# Patient Record
Sex: MALE | Race: WHITE | NOT HISPANIC OR LATINO | Employment: OTHER | URBAN - METROPOLITAN AREA
[De-identification: names, ages, dates, MRNs, and addresses within clinical notes are randomized per-mention and may not be internally consistent; named-entity substitution may affect disease eponyms.]

---

## 2022-11-24 ENCOUNTER — HOSPITAL ENCOUNTER (INPATIENT)
Facility: HOSPITAL | Age: 72
LOS: 4 days | Discharge: HOME HEALTH | DRG: 522 | End: 2022-11-28
Attending: EMERGENCY MEDICINE | Admitting: FAMILY MEDICINE
Payer: MEDICARE

## 2022-11-24 DIAGNOSIS — R07.9 CHEST PAIN: ICD-10-CM
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
## 2022-11-24 DIAGNOSIS — Z96.642 S/P TOTAL LEFT HIP ARTHROPLASTY: ICD-10-CM

## 2022-11-24 DIAGNOSIS — S72.002A CLOSED DISPLACED FRACTURE OF LEFT FEMORAL NECK: Primary | ICD-10-CM

## 2022-11-24 DIAGNOSIS — Z01.810 PREOP CARDIOVASCULAR EXAM: ICD-10-CM

## 2022-11-24 DIAGNOSIS — M25.552 LEFT HIP PAIN: ICD-10-CM

## 2022-11-24 LAB
ALBUMIN SERPL BCP-MCNC: 3.4 G/DL (ref 3.5–5.2)
ALP SERPL-CCNC: 57 U/L (ref 55–135)
ALT SERPL W/O P-5'-P-CCNC: 16 U/L (ref 10–44)
ANION GAP SERPL CALC-SCNC: 13 MMOL/L (ref 8–16)
APTT BLDCRRT: 24.2 SEC (ref 21–32)
AST SERPL-CCNC: 18 U/L (ref 10–40)
BACTERIA #/AREA URNS HPF: NORMAL /HPF
BASOPHILS # BLD AUTO: 0.07 K/UL (ref 0–0.2)
BASOPHILS NFR BLD: 0.9 % (ref 0–1.9)
BILIRUB SERPL-MCNC: 0.6 MG/DL (ref 0.1–1)
BILIRUB UR QL STRIP: NEGATIVE
BUN SERPL-MCNC: 24 MG/DL (ref 8–23)
CALCIUM SERPL-MCNC: 8.8 MG/DL (ref 8.7–10.5)
CHLORIDE SERPL-SCNC: 108 MMOL/L (ref 95–110)
CLARITY UR: CLEAR
CO2 SERPL-SCNC: 18 MMOL/L (ref 23–29)
COLOR UR: YELLOW
CREAT SERPL-MCNC: 1.4 MG/DL (ref 0.5–1.4)
DIFFERENTIAL METHOD: ABNORMAL
EOSINOPHIL # BLD AUTO: 0.1 K/UL (ref 0–0.5)
EOSINOPHIL NFR BLD: 0.6 % (ref 0–8)
ERYTHROCYTE [DISTWIDTH] IN BLOOD BY AUTOMATED COUNT: 13.5 % (ref 11.5–14.5)
EST. GFR  (NO RACE VARIABLE): 53 ML/MIN/1.73 M^2
GLUCOSE SERPL-MCNC: 78 MG/DL (ref 70–110)
GLUCOSE UR QL STRIP: NEGATIVE
HCT VFR BLD AUTO: 40 % (ref 40–54)
HGB BLD-MCNC: 13.5 G/DL (ref 14–18)
HGB UR QL STRIP: ABNORMAL
HYALINE CASTS #/AREA URNS LPF: 0 /LPF
IMM GRANULOCYTES # BLD AUTO: 0.15 K/UL (ref 0–0.04)
IMM GRANULOCYTES NFR BLD AUTO: 1.9 % (ref 0–0.5)
INR PPP: 1.1 (ref 0.8–1.2)
KETONES UR QL STRIP: ABNORMAL
LEUKOCYTE ESTERASE UR QL STRIP: NEGATIVE
LYMPHOCYTES # BLD AUTO: 0.9 K/UL (ref 1–4.8)
LYMPHOCYTES NFR BLD: 11.4 % (ref 18–48)
MCH RBC QN AUTO: 30.3 PG (ref 27–31)
MCHC RBC AUTO-ENTMCNC: 33.8 G/DL (ref 32–36)
MCV RBC AUTO: 90 FL (ref 82–98)
MICROSCOPIC COMMENT: NORMAL
MONOCYTES # BLD AUTO: 1.2 K/UL (ref 0.3–1)
MONOCYTES NFR BLD: 14.9 % (ref 4–15)
NEUTROPHILS # BLD AUTO: 5.6 K/UL (ref 1.8–7.7)
NEUTROPHILS NFR BLD: 70.3 % (ref 38–73)
NITRITE UR QL STRIP: NEGATIVE
NRBC BLD-RTO: 0 /100 WBC
PH UR STRIP: 6 [PH] (ref 5–8)
PLATELET # BLD AUTO: 183 K/UL (ref 150–450)
PMV BLD AUTO: 9.8 FL (ref 9.2–12.9)
POCT GLUCOSE: 107 MG/DL (ref 70–110)
POTASSIUM SERPL-SCNC: 3.5 MMOL/L (ref 3.5–5.1)
PROT SERPL-MCNC: 7.1 G/DL (ref 6–8.4)
PROT UR QL STRIP: ABNORMAL
PROTHROMBIN TIME: 11.4 SEC (ref 9–12.5)
RBC # BLD AUTO: 4.46 M/UL (ref 4.6–6.2)
RBC #/AREA URNS HPF: 2 /HPF (ref 0–4)
SODIUM SERPL-SCNC: 139 MMOL/L (ref 136–145)
SP GR UR STRIP: 1.01 (ref 1–1.03)
URN SPEC COLLECT METH UR: ABNORMAL
UROBILINOGEN UR STRIP-ACNC: NEGATIVE EU/DL
WBC # BLD AUTO: 7.96 K/UL (ref 3.9–12.7)
WBC #/AREA URNS HPF: 0 /HPF (ref 0–5)
WBC CLUMPS URNS QL MICRO: NORMAL

## 2022-11-24 PROCEDURE — 27000221 HC OXYGEN, UP TO 24 HOURS

## 2022-11-24 PROCEDURE — 93005 ELECTROCARDIOGRAM TRACING: CPT

## 2022-11-24 PROCEDURE — 63600175 PHARM REV CODE 636 W HCPCS: Performed by: NURSE PRACTITIONER

## 2022-11-24 PROCEDURE — 85025 COMPLETE CBC W/AUTO DIFF WBC: CPT | Performed by: EMERGENCY MEDICINE

## 2022-11-24 PROCEDURE — 25000003 PHARM REV CODE 250: Performed by: NURSE PRACTITIONER

## 2022-11-24 PROCEDURE — 85730 THROMBOPLASTIN TIME PARTIAL: CPT | Performed by: EMERGENCY MEDICINE

## 2022-11-24 PROCEDURE — 93010 ELECTROCARDIOGRAM REPORT: CPT | Mod: ,,, | Performed by: INTERNAL MEDICINE

## 2022-11-24 PROCEDURE — 63600175 PHARM REV CODE 636 W HCPCS: Performed by: EMERGENCY MEDICINE

## 2022-11-24 PROCEDURE — 80053 COMPREHEN METABOLIC PANEL: CPT | Performed by: EMERGENCY MEDICINE

## 2022-11-24 PROCEDURE — 99285 EMERGENCY DEPT VISIT HI MDM: CPT | Mod: 25

## 2022-11-24 PROCEDURE — 85610 PROTHROMBIN TIME: CPT | Performed by: EMERGENCY MEDICINE

## 2022-11-24 PROCEDURE — 93010 EKG 12-LEAD: ICD-10-PCS | Mod: ,,, | Performed by: INTERNAL MEDICINE

## 2022-11-24 PROCEDURE — 11000001 HC ACUTE MED/SURG PRIVATE ROOM

## 2022-11-24 PROCEDURE — 81000 URINALYSIS NONAUTO W/SCOPE: CPT | Performed by: EMERGENCY MEDICINE

## 2022-11-24 RX ORDER — NAPROXEN SODIUM 220 MG/1
81 TABLET, FILM COATED ORAL DAILY
Status: ON HOLD | COMMUNITY
End: 2022-11-28 | Stop reason: SDUPTHER

## 2022-11-24 RX ORDER — OLMESARTAN MEDOXOMIL 40 MG/1
TABLET ORAL
COMMUNITY
End: 2022-11-24 | Stop reason: DRUGHIGH

## 2022-11-24 RX ORDER — MORPHINE SULFATE 4 MG/ML
4 INJECTION, SOLUTION INTRAMUSCULAR; INTRAVENOUS
Status: COMPLETED | OUTPATIENT
Start: 2022-11-24 | End: 2022-11-24

## 2022-11-24 RX ORDER — LOSARTAN POTASSIUM 50 MG/1
100 TABLET ORAL DAILY
Status: DISCONTINUED | OUTPATIENT
Start: 2022-11-24 | End: 2022-11-25

## 2022-11-24 RX ORDER — MAG HYDROX/ALUMINUM HYD/SIMETH 200-200-20
30 SUSPENSION, ORAL (FINAL DOSE FORM) ORAL 4 TIMES DAILY PRN
Status: DISCONTINUED | OUTPATIENT
Start: 2022-11-24 | End: 2022-11-26

## 2022-11-24 RX ORDER — AMLODIPINE BESYLATE 10 MG/1
1 TABLET ORAL DAILY
COMMUNITY
Start: 2022-06-28

## 2022-11-24 RX ORDER — AMOXICILLIN 250 MG
1 CAPSULE ORAL 2 TIMES DAILY PRN
Status: DISCONTINUED | OUTPATIENT
Start: 2022-11-24 | End: 2022-11-27

## 2022-11-24 RX ORDER — NALOXONE HCL 0.4 MG/ML
0.02 VIAL (ML) INJECTION
Status: DISCONTINUED | OUTPATIENT
Start: 2022-11-24 | End: 2022-11-28 | Stop reason: HOSPADM

## 2022-11-24 RX ORDER — ONDANSETRON 2 MG/ML
4 INJECTION INTRAMUSCULAR; INTRAVENOUS
Status: COMPLETED | OUTPATIENT
Start: 2022-11-24 | End: 2022-11-24

## 2022-11-24 RX ORDER — OLMESARTAN MEDOXOMIL AND HYDROCHLOROTHIAZIDE 40/12.5 40; 12.5 MG/1; MG/1
1 TABLET ORAL DAILY
COMMUNITY

## 2022-11-24 RX ORDER — GLIMEPIRIDE 1 MG/1
1 TABLET ORAL 2 TIMES DAILY
COMMUNITY
Start: 2022-10-04

## 2022-11-24 RX ORDER — LEVOTHYROXINE SODIUM 50 UG/1
50 TABLET ORAL
COMMUNITY

## 2022-11-24 RX ORDER — INSULIN GLARGINE 300 U/ML
300 INJECTION, SOLUTION SUBCUTANEOUS DAILY
COMMUNITY
Start: 2022-05-06

## 2022-11-24 RX ORDER — LEVOTHYROXINE SODIUM 50 UG/1
50 TABLET ORAL
Status: DISCONTINUED | OUTPATIENT
Start: 2022-11-25 | End: 2022-11-28 | Stop reason: HOSPADM

## 2022-11-24 RX ORDER — GLUCAGON 1 MG
1 KIT INJECTION
Status: DISCONTINUED | OUTPATIENT
Start: 2022-11-24 | End: 2022-11-28 | Stop reason: HOSPADM

## 2022-11-24 RX ORDER — IBUPROFEN 200 MG
16 TABLET ORAL
Status: DISCONTINUED | OUTPATIENT
Start: 2022-11-24 | End: 2022-11-28 | Stop reason: HOSPADM

## 2022-11-24 RX ORDER — IBUPROFEN 200 MG
24 TABLET ORAL
Status: DISCONTINUED | OUTPATIENT
Start: 2022-11-24 | End: 2022-11-28 | Stop reason: HOSPADM

## 2022-11-24 RX ORDER — MORPHINE SULFATE 4 MG/ML
4 INJECTION, SOLUTION INTRAMUSCULAR; INTRAVENOUS EVERY 4 HOURS PRN
Status: DISCONTINUED | OUTPATIENT
Start: 2022-11-24 | End: 2022-11-26

## 2022-11-24 RX ORDER — ONDANSETRON 2 MG/ML
4 INJECTION INTRAMUSCULAR; INTRAVENOUS EVERY 8 HOURS PRN
Status: DISCONTINUED | OUTPATIENT
Start: 2022-11-24 | End: 2022-11-28 | Stop reason: HOSPADM

## 2022-11-24 RX ORDER — TAMSULOSIN HYDROCHLORIDE 0.4 MG/1
0.4 CAPSULE ORAL DAILY
COMMUNITY

## 2022-11-24 RX ORDER — METOPROLOL SUCCINATE 50 MG/1
50 TABLET, EXTENDED RELEASE ORAL DAILY
COMMUNITY
Start: 2022-02-03

## 2022-11-24 RX ORDER — NAPROXEN SODIUM 220 MG/1
81 TABLET, FILM COATED ORAL DAILY
Status: DISCONTINUED | OUTPATIENT
Start: 2022-11-26 | End: 2022-11-24

## 2022-11-24 RX ORDER — CHOLECALCIFEROL (VITAMIN D3) 50 MCG
1 TABLET ORAL DAILY
COMMUNITY

## 2022-11-24 RX ORDER — IPRATROPIUM BROMIDE AND ALBUTEROL SULFATE 2.5; .5 MG/3ML; MG/3ML
3 SOLUTION RESPIRATORY (INHALATION) EVERY 4 HOURS PRN
Status: DISCONTINUED | OUTPATIENT
Start: 2022-11-24 | End: 2022-11-28 | Stop reason: HOSPADM

## 2022-11-24 RX ORDER — AMLODIPINE BESYLATE 10 MG/1
10 TABLET ORAL DAILY
Status: DISCONTINUED | OUTPATIENT
Start: 2022-11-24 | End: 2022-11-27

## 2022-11-24 RX ORDER — METFORMIN HYDROCHLORIDE 500 MG/1
500 TABLET ORAL 2 TIMES DAILY WITH MEALS
COMMUNITY

## 2022-11-24 RX ORDER — METOPROLOL SUCCINATE 50 MG/1
50 TABLET, EXTENDED RELEASE ORAL DAILY
Status: DISCONTINUED | OUTPATIENT
Start: 2022-11-24 | End: 2022-11-28 | Stop reason: HOSPADM

## 2022-11-24 RX ORDER — ACETAMINOPHEN 325 MG/1
650 TABLET ORAL EVERY 4 HOURS PRN
Status: DISCONTINUED | OUTPATIENT
Start: 2022-11-24 | End: 2022-11-28 | Stop reason: HOSPADM

## 2022-11-24 RX ORDER — TAMSULOSIN HYDROCHLORIDE 0.4 MG/1
0.4 CAPSULE ORAL DAILY
Status: DISCONTINUED | OUTPATIENT
Start: 2022-11-25 | End: 2022-11-28 | Stop reason: HOSPADM

## 2022-11-24 RX ORDER — HYDROCHLOROTHIAZIDE 12.5 MG/1
12.5 TABLET ORAL DAILY
Status: DISCONTINUED | OUTPATIENT
Start: 2022-11-24 | End: 2022-11-26

## 2022-11-24 RX ORDER — INSULIN ASPART 100 [IU]/ML
1-10 INJECTION, SOLUTION INTRAVENOUS; SUBCUTANEOUS
Status: DISCONTINUED | OUTPATIENT
Start: 2022-11-24 | End: 2022-11-28 | Stop reason: HOSPADM

## 2022-11-24 RX ORDER — POLYETHYLENE GLYCOL 3350 17 G/17G
17 POWDER, FOR SOLUTION ORAL DAILY
Status: DISCONTINUED | OUTPATIENT
Start: 2022-11-25 | End: 2022-11-28 | Stop reason: HOSPADM

## 2022-11-24 RX ORDER — TALC
6 POWDER (GRAM) TOPICAL NIGHTLY PRN
Status: DISCONTINUED | OUTPATIENT
Start: 2022-11-24 | End: 2022-11-28 | Stop reason: HOSPADM

## 2022-11-24 RX ORDER — OLMESARTAN MEDOXOMIL AND HYDROCHLOROTHIAZIDE 40/12.5 40; 12.5 MG/1; MG/1
1 TABLET ORAL DAILY
Status: DISCONTINUED | OUTPATIENT
Start: 2022-11-24 | End: 2022-11-24

## 2022-11-24 RX ORDER — FERROUS SULFATE 325(65) MG
325 TABLET ORAL DAILY
COMMUNITY

## 2022-11-24 RX ORDER — SIMETHICONE 80 MG
1 TABLET,CHEWABLE ORAL 4 TIMES DAILY PRN
Status: DISCONTINUED | OUTPATIENT
Start: 2022-11-24 | End: 2022-11-28 | Stop reason: HOSPADM

## 2022-11-24 RX ADMIN — ONDANSETRON 4 MG: 2 INJECTION INTRAMUSCULAR; INTRAVENOUS at 01:11

## 2022-11-24 RX ADMIN — METOPROLOL SUCCINATE 50 MG: 50 TABLET, EXTENDED RELEASE ORAL at 03:11

## 2022-11-24 RX ADMIN — LOSARTAN POTASSIUM 100 MG: 50 TABLET, FILM COATED ORAL at 03:11

## 2022-11-24 RX ADMIN — MORPHINE SULFATE 4 MG: 4 INJECTION INTRAVENOUS at 07:11

## 2022-11-24 RX ADMIN — MORPHINE SULFATE 4 MG: 4 INJECTION INTRAVENOUS at 01:11

## 2022-11-24 RX ADMIN — AMLODIPINE BESYLATE 10 MG: 10 TABLET ORAL at 03:11

## 2022-11-24 RX ADMIN — HYDROCHLOROTHIAZIDE 12.5 MG: 12.5 TABLET ORAL at 03:11

## 2022-11-24 NOTE — ASSESSMENT & PLAN NOTE
--Last seen by Dr. Montana 10/18/22. He was without cardiac concerns or symptoms.    --Denies angina.   --Resume ASA, metoprolol  --Intolerant to STATIN

## 2022-11-24 NOTE — SUBJECTIVE & OBJECTIVE
Past Medical History:   Diagnosis Date    Coronary artery disease     Diabetes mellitus     Hypertension      No past surgical history on file.    Review of patient's allergies indicates:   Allergen Reactions    Statins-hmg-coa reductase inhibitors Other (See Comments)     Muscle aches  Joint Stiffness     No current facility-administered medications on file prior to encounter.     Current Outpatient Medications on File Prior to Encounter   Medication Sig    amLODIPine (NORVASC) 10 MG tablet Take 1 tablet by mouth once daily.    aspirin 81 MG Chew Take 81 mg by mouth once daily.    cholecalciferol, vitamin D3, (VITAMIN D3) 50 mcg (2,000 unit) Tab Take 1 tablet by mouth once daily.    ferrous sulfate (FEOSOL) 325 mg (65 mg iron) Tab tablet Take 325 mg by mouth once daily.    insulin glargine, TOUJEO, (TOUJEO SOLOSTAR U-300 INSULIN) 300 unit/mL (1.5 mL) InPn pen Inject 300 Units into the skin once daily.    levothyroxine (SYNTHROID) 50 MCG tablet Take 50 mcg by mouth before breakfast.    metFORMIN (GLUCOPHAGE) 500 MG tablet Take 500 mg by mouth 2 (two) times daily with meals.    metoprolol succinate (TOPROL-XL) 50 MG 24 hr tablet Take 50 mg by mouth once daily.    olmesartan-hydrochlorothiazide (BENICAR HCT) 40-12.5 mg Tab Take 1 tablet by mouth once daily.    tamsulosin (FLOMAX) 0.4 mg Cap Take 0.4 mg by mouth once daily.    [DISCONTINUED] olmesartan (BENICAR) 40 MG tablet olmesartan Take tablet (oral) 1 time per day No date recorded tablet 1 time per day oral No set duration recorded No set duration amount recorded active 40 mg    glimepiride (AMARYL) 1 MG tablet Take 1 mg by mouth 2 (two) times a day.     Family History    None       Tobacco Use    Smoking status: Not on file    Smokeless tobacco: Not on file   Substance and Sexual Activity    Alcohol use: Not on file    Drug use: Not on file    Sexual activity: Not on file     Review of Systems   Constitutional:  Negative for chills, diaphoresis, fatigue and  fever.   HENT:  Negative for drooling, ear pain, rhinorrhea and sore throat.    Eyes: Negative.    Respiratory:  Negative for cough, shortness of breath and wheezing.    Cardiovascular:  Negative for palpitations and leg swelling.   Gastrointestinal:  Negative for abdominal pain, constipation, diarrhea and nausea.   Endocrine: Negative.    Genitourinary:  Negative for dysuria, hematuria and urgency.   Musculoskeletal:  Positive for arthralgias and gait problem.   Skin:  Negative for color change and wound.   Allergic/Immunologic: Negative.    Neurological:  Negative for dizziness, syncope and speech difficulty.   Hematological: Negative.    Psychiatric/Behavioral: Negative.     Objective:     Vital Signs (Most Recent):  Temp: 98.6 °F (37 °C) (11/24/22 1406)  Pulse: 78 (11/24/22 1406)  Resp: 16 (11/24/22 1406)  BP: 124/62 (11/24/22 1406)  SpO2: (!) 93 % (11/24/22 1406)   Vital Signs (24h Range):  Temp:  [98.4 °F (36.9 °C)-98.6 °F (37 °C)] 98.6 °F (37 °C)  Pulse:  [78-86] 78  Resp:  [16-24] 16  SpO2:  [90 %-96 %] 93 %  BP: (124-149)/(62-79) 124/62     Weight: 74.8 kg (165 lb)  Body mass index is 26.63 kg/m².    Physical Exam  Vitals and nursing note reviewed.   Constitutional:       General: He is not in acute distress.     Appearance: He is well-developed.   HENT:      Head: Normocephalic and atraumatic.   Cardiovascular:      Rate and Rhythm: Normal rate and regular rhythm.      Heart sounds: Normal heart sounds.   Pulmonary:      Effort: Pulmonary effort is normal. No respiratory distress.      Breath sounds: Normal breath sounds.   Abdominal:      General: Bowel sounds are normal. There is no distension.      Palpations: Abdomen is soft.      Tenderness: There is no abdominal tenderness.   Musculoskeletal:         General: Normal range of motion.      Cervical back: Normal range of motion and neck supple. No edema.      Comments: LLE is shortened and externally rotated. TTP over left hip.   Skin:     General: Skin  is dry.   Neurological:      Mental Status: He is alert and oriented to person, place, and time.          Significant Labs: All pertinent labs within the past 24 hours have been reviewed.  BMP:   Recent Labs   Lab 11/24/22  1227   GLU 78      K 3.5      CO2 18*   BUN 24*   CREATININE 1.4   CALCIUM 8.8     CBC:   Recent Labs   Lab 11/24/22  1145   WBC 7.96   HGB 13.5*   HCT 40.0          Significant Imaging:   XR HIP WITH PELVIS WHEN PERFORMED, 2 OR 3 VIEWS LEFT     CLINICAL HISTORY:  Pain in left hip     TECHNIQUE:  Routine radiographs obtained.     COMPARISON:  None     FINDINGS:  There is a complete fracture through the subcapital left femoral neck that is slightly obliquely oriented extending into the more inferior aspect of the femoral neck along its medial aspect.  There is some cortical offset at its superior aspect of approximately 5 mm.     No additional fractures.     No significant arthritic changes.     Arterial vascular calcification.     Impression:     Left femoral neck fracture with mild offset.

## 2022-11-24 NOTE — PHARMACY MED REC
"Admission Medication History     The home medication history was taken by Troy Hernandez.    You may go to "Admission" then "Reconcile Home Medications" tabs to review and/or act upon these items.     The home medication list has been updated by the Pharmacy department.   Please read ALL comments highlighted in yellow.   Please address this information as you see fit.    Feel free to contact us if you have any questions or require assistance.      Medications listed below were obtained from: Patient/family and Analytic software- [employer]  (Not in a hospital admission)      Troy Hernandez  [id number]    Current Outpatient Medications on File Prior to Encounter   Medication Sig Dispense Refill Last Dose    amLODIPine (NORVASC) 10 MG tablet Take 1 tablet by mouth once daily.   11/24/2022    aspirin 81 MG Chew Take 81 mg by mouth once daily.   11/24/2022    cholecalciferol, vitamin D3, (VITAMIN D3) 50 mcg (2,000 unit) Tab Take 1 tablet by mouth once daily.   11/24/2022    ferrous sulfate (FEOSOL) 325 mg (65 mg iron) Tab tablet Take 325 mg by mouth once daily.   11/24/2022    insulin glargine, TOUJEO, (TOUJEO SOLOSTAR U-300 INSULIN) 300 unit/mL (1.5 mL) InPn pen Inject 300 Units into the skin once daily.   11/23/2022    levothyroxine (SYNTHROID) 50 MCG tablet Take 50 mcg by mouth before breakfast.   11/24/2022    metFORMIN (GLUCOPHAGE) 500 MG tablet Take 500 mg by mouth 2 (two) times daily with meals.   11/24/2022    metoprolol succinate (TOPROL-XL) 50 MG 24 hr tablet Take 50 mg by mouth once daily.   11/24/2022    olmesartan-hydrochlorothiazide (BENICAR HCT) 40-12.5 mg Tab Take 1 tablet by mouth once daily.   11/24/2022    tamsulosin (FLOMAX) 0.4 mg Cap Take 0.4 mg by mouth once daily.   11/24/2022    glimepiride (AMARYL) 1 MG tablet Take 1 mg by mouth 2 (two) times a day.   Unknown                           .        "

## 2022-11-24 NOTE — H&P
[facility]  History & Physical    Patient Name: Nahum Palacio  MRN: [id number]  Patient Class: IP- Inpatient  Admission Date: 11/24/2022  Attending Physician: Brigid Tompkins MD   Primary Care Provider: Eric Foster MD       Patient information was obtained from patient and ER records.     Subjective:     Principal Problem:Closed left hip fracture, initial encounter    Chief Complaint:   Chief Complaint   Patient presents with    Fall     Left hip pain after a ground level fall. Denies LOC, no head trauma. 100 mcg of Fentanyl given prior to transport.        HPI: Mauro Palacio is a 71-year-old male with a history of HTN, CAD s/p CABG 2006, and DM who presented to ED for further evaluation of left hip pain following a mechanical fall. According to the patient, he lost his balance after hitting a dog kennel, landing on his left side. He denies attributing factors of dizziness, LOC, or light-headedness. Before the incident, he ambulated independently.     In ED, he was x-ray demonstrated a left formal neck fracture. Orthopedic surgery has been consulted. CT-Left hip is pending. Plans for surgical repair in AM. Hospital medicine has been consulted for admission.     The patient is a Full Code. SDM is his wife, Amira.         Past Medical History:   Diagnosis Date    Coronary artery disease     Diabetes mellitus     Hypertension      No past surgical history on file.    Review of patient's allergies indicates:   Allergen Reactions    Statins-hmg-coa reductase inhibitors Other (See Comments)     Muscle aches  Joint Stiffness     No current facility-administered medications on file prior to encounter.     Current Outpatient Medications on File Prior to Encounter   Medication Sig    amLODIPine (NORVASC) 10 MG tablet Take 1 tablet by mouth once daily.    aspirin 81 MG Chew Take 81 mg by mouth once daily.    cholecalciferol, vitamin D3, (VITAMIN D3) 50 mcg (2,000 unit) Tab Take 1  tablet by mouth once daily.    ferrous sulfate (FEOSOL) 325 mg (65 mg iron) Tab tablet Take 325 mg by mouth once daily.    insulin glargine, TOUJEO, (TOUJEO SOLOSTAR U-300 INSULIN) 300 unit/mL (1.5 mL) InPn pen Inject 300 Units into the skin once daily.    levothyroxine (SYNTHROID) 50 MCG tablet Take 50 mcg by mouth before breakfast.    metFORMIN (GLUCOPHAGE) 500 MG tablet Take 500 mg by mouth 2 (two) times daily with meals.    metoprolol succinate (TOPROL-XL) 50 MG 24 hr tablet Take 50 mg by mouth once daily.    olmesartan-hydrochlorothiazide (BENICAR HCT) 40-12.5 mg Tab Take 1 tablet by mouth once daily.    tamsulosin (FLOMAX) 0.4 mg Cap Take 0.4 mg by mouth once daily.    [DISCONTINUED] olmesartan (BENICAR) 40 MG tablet olmesartan Take tablet (oral) 1 time per day No date recorded tablet 1 time per day oral No set duration recorded No set duration amount recorded active 40 mg    glimepiride (AMARYL) 1 MG tablet Take 1 mg by mouth 2 (two) times a day.     Family History    None       Tobacco Use    Smoking status: Not on file    Smokeless tobacco: Not on file   Substance and Sexual Activity    Alcohol use: Not on file    Drug use: Not on file    Sexual activity: Not on file     Review of Systems   Constitutional:  Negative for chills, diaphoresis, fatigue and fever.   HENT:  Negative for drooling, ear pain, rhinorrhea and sore throat.    Eyes: Negative.    Respiratory:  Negative for cough, shortness of breath and wheezing.    Cardiovascular:  Negative for palpitations and leg swelling.   Gastrointestinal:  Negative for abdominal pain, constipation, diarrhea and nausea.   Endocrine: Negative.    Genitourinary:  Negative for dysuria, hematuria and urgency.   Musculoskeletal:  Positive for arthralgias and gait problem.   Skin:  Negative for color change and wound.   Allergic/Immunologic: Negative.    Neurological:  Negative for dizziness, syncope and speech difficulty.   Hematological: Negative.     Psychiatric/Behavioral: Negative.     Objective:     Vital Signs (Most Recent):  Temp: 98.6 °F (37 °C) (11/24/22 1406)  Pulse: 78 (11/24/22 1406)  Resp: 16 (11/24/22 1406)  BP: 124/62 (11/24/22 1406)  SpO2: (!) 93 % (11/24/22 1406)   Vital Signs (24h Range):  Temp:  [98.4 °F (36.9 °C)-98.6 °F (37 °C)] 98.6 °F (37 °C)  Pulse:  [78-86] 78  Resp:  [16-24] 16  SpO2:  [90 %-96 %] 93 %  BP: (124-149)/(62-79) 124/62     Weight: 74.8 kg (165 lb)  Body mass index is 26.63 kg/m².    Physical Exam  Vitals and nursing note reviewed.   Constitutional:       General: He is not in acute distress.     Appearance: He is well-developed.   HENT:      Head: Normocephalic and atraumatic.   Cardiovascular:      Rate and Rhythm: Normal rate and regular rhythm.      Heart sounds: Normal heart sounds.   Pulmonary:      Effort: Pulmonary effort is normal. No respiratory distress.      Breath sounds: Normal breath sounds.   Abdominal:      General: Bowel sounds are normal. There is no distension.      Palpations: Abdomen is soft.      Tenderness: There is no abdominal tenderness.   Musculoskeletal:         General: Normal range of motion.      Cervical back: Normal range of motion and neck supple. No edema.      Comments: LLE is shortened and externally rotated. TTP over left hip.   Skin:     General: Skin is dry.   Neurological:      Mental Status: He is alert and oriented to person, place, and time.          Significant Labs: All pertinent labs within the past 24 hours have been reviewed.  BMP:   Recent Labs   Lab 11/24/22  1227   GLU 78      K 3.5      CO2 18*   BUN 24*   CREATININE 1.4   CALCIUM 8.8     CBC:   Recent Labs   Lab 11/24/22  1145   WBC 7.96   HGB 13.5*   HCT 40.0          Significant Imaging:   XR HIP WITH PELVIS WHEN PERFORMED, 2 OR 3 VIEWS LEFT     CLINICAL HISTORY:  Pain in left hip     TECHNIQUE:  Routine radiographs obtained.     COMPARISON:  None     FINDINGS:  There is a complete fracture through  the subcapital left femoral neck that is slightly obliquely oriented extending into the more inferior aspect of the femoral neck along its medial aspect.  There is some cortical offset at its superior aspect of approximately 5 mm.     No additional fractures.     No significant arthritic changes.     Arterial vascular calcification.     Impression:     Left femoral neck fracture with mild offset.       Assessment/Plan:     * Closed left hip fracture, initial encounter  --Hip fracture was demonstrated on X-ray with mild offset. The case was discussed with Dr. Ventura, Orthopedic Surgery, who will plan to operate Friday, 11/25/22.   --He has a recent routine follow-up with his primary cardiologist, Dr. Montana. He has a 6.0% 30-day risk of death or cardiac arrest for this urgent orthopedic procedure, given his CAD history based on the revised cardiac Risk Index. The patient verbalized understanding. NPO past midnight  --analgesia prn  --PT/OT has been ordered postoperatively. We will consult  once the assessment has been completed.         Hypothyroid  --TSH from 8/30/22 reviewed. Continue Synthroid.    Hypertension  --His blood pressure is currently controlled. (antihypertensives recently optimized by adding HCTZ to olmesartan.  --Continue amlodipine 10mg, metoprolol 50mg  --Will replace Olmesartan with Lorstan while hospitalized.    Diabetes mellitus  Patient's FSGs are controlled on current medication regimen.  Last A1c reviewed- 6.5 on 8/30/22  Current correctional scale  Medium  Maintain anti-hyperglycemic dose as follows-   Antihyperglycemics (From admission, onward)    Start     Stop Route Frequency Ordered    11/24/22 1423  insulin aspart U-100 pen 1-10 Units         -- SubQ Before meals & nightly PRN 11/24/22 1323        Hold Oral hypoglycemics while patient is in the hospital.    Coronary artery disease  --Last seen by Dr. Montana 10/18/22. He was without cardiac concerns or symptoms.    --Denies  angina.   --Resume ASA, metoprolol  --Intolerant to STATIN        VTE Risk Mitigation (From admission, onward)         Ordered     Place sequential compression device  Until discontinued         11/24/22 8984                Roberto Carlos Arias NP  Department of [facility]   [facility]

## 2022-11-24 NOTE — ASSESSMENT & PLAN NOTE
--His blood pressure is currently controlled. (antihypertensives recently optimized by adding HCTZ to olmesartan.  --Continue amlodipine 10mg, metoprolol 50mg  --Will replace Olmesartan with Lorstan while hospitalized.

## 2022-11-24 NOTE — ED PROVIDER NOTES
SCRIBE #1 NOTE: I, Abe Weeks, am scribing for, and in the presence of, Tiffanie Sánchez MD. I have scribed the entire note.      History      Chief Complaint   Patient presents with    Fall     Left hip pain after a ground level fall. Denies LOC, no head trauma. 100 mcg of Fentanyl given prior to transport.       Review of patient's allergies indicates:   Allergen Reactions    Statins-hmg-coa reductase inhibitors Other (See Comments)     Muscle aches  Joint Stiffness        HPI   HPI    11/24/2022, 12:02 PM   History obtained from the patient      History of Present Illness: Nahum Palacio is a 71 y.o. male patient who presents to the Emergency Department for evaluation following a fall just PTA. Pt states that he was bending forward to pick something off the ground when he hit a nearby dog kennel, causing him to fall onto his L side. Pt reports L hip pain. Symptoms are constant and moderate in severity. No mitigating or exacerbating factors reported. No associated sxs reported. Patient denies any LOC, head trauma, fever, chills, n/v/d, SOB, CP, weakness, numbness, dizziness, headache, and all other sxs at this time. Pt last ate last night, and takes an 81 mg ASA daily. Pt was given 100 mcg Fentanyl en route to the ED. No further complaints or concerns at this time.     Arrival mode: EMS    PCP: No primary care provider on file.       Past Medical History:  Past Medical History:   Diagnosis Date    Coronary artery disease     Diabetes mellitus     Hypertension        Past Surgical History:  No past surgical history on file.      Family History:  No family history on file.    Social History:  Social History     Tobacco Use    Smoking status: Not on file    Smokeless tobacco: Not on file   Substance and Sexual Activity    Alcohol use: Not on file    Drug use: Not on file    Sexual activity: Not on file       ROS   Review of Systems   Constitutional:  Negative for chills and fever.   HENT:  Negative for sore throat.  "   Respiratory:  Negative for shortness of breath.    Cardiovascular:  Negative for chest pain.   Gastrointestinal:  Negative for constipation, diarrhea, nausea and vomiting.   Genitourinary:  Negative for dysuria.   Musculoskeletal:  Positive for arthralgias (L hip). Negative for back pain.   Skin:  Negative for rash.   Neurological:  Negative for dizziness, syncope, weakness, light-headedness, numbness and headaches.   Hematological:  Does not bruise/bleed easily.   All other systems reviewed and are negative.    Physical Exam      Initial Vitals [11/24/22 1128]   BP Pulse Resp Temp SpO2   138/79 85 16 98.4 °F (36.9 °C) 95 %      MAP       --          Physical Exam  Nursing Notes and Vital Signs Reviewed.  Constitutional: Patient is in no acute distress. Well-developed and well-nourished.  Head: Atraumatic. Normocephalic.  Eyes: PERRL. EOM intact. Conjunctivae are not pale. No scleral icterus.  ENT: Mucous membranes are moist. Oropharynx is clear and symmetric.    Neck: Supple. Full ROM.  Cardiovascular: Regular rate. Regular rhythm. No murmurs, rubs, or gallops. Distal pulses are 2+ and symmetric.  Pulmonary/Chest: No respiratory distress. Clear to auscultation bilaterally. No wheezing or rales.  Abdominal: Soft and non-distended.  There is no tenderness.  No rebound, guarding, or rigidity.   Musculoskeletal: LLE is obviously shortened and externally rotated. Mild tenderness over the L hip.  Skin: Warm and dry.  Neurological:  Alert, awake, and appropriate.  Normal speech.  No acute focal neurological deficits are appreciated.  Psychiatric: Normal affect. Good eye contact. Appropriate in content.    ED Course    Procedures  ED Vital Signs:  Vitals:    11/24/22 1128 11/24/22 1140 11/24/22 1146 11/24/22 1148   BP: 138/79 134/64     Pulse: 85 86     Resp: 16 16     Temp: 98.4 °F (36.9 °C)      TempSrc: Oral      SpO2: 95% (!) 93%     Weight:   74.8 kg (165 lb)    Height:    5' 6" (1.676 m)    11/24/22 1200 11/24/22 " 1233 11/24/22 1240 11/24/22 1319   BP:   (!) 149/72    Pulse: 83 84 83    Resp: (!) 24 18 16 16   Temp:       TempSrc:       SpO2: (!) 90% 95% 96%    Weight:       Height:           Abnormal Lab Results:  Labs Reviewed   CBC W/ AUTO DIFFERENTIAL - Abnormal; Notable for the following components:       Result Value    RBC 4.46 (*)     Hemoglobin 13.5 (*)     Immature Granulocytes 1.9 (*)     Immature Grans (Abs) 0.15 (*)     Lymph # 0.9 (*)     Mono # 1.2 (*)     Lymph % 11.4 (*)     All other components within normal limits   URINALYSIS, REFLEX TO URINE CULTURE - Abnormal; Notable for the following components:    Protein, UA 1+ (*)     Ketones, UA Trace (*)     Occult Blood UA Trace (*)     All other components within normal limits    Narrative:     Specimen Source->Urine   COMPREHENSIVE METABOLIC PANEL - Abnormal; Notable for the following components:    CO2 18 (*)     BUN 24 (*)     Albumin 3.4 (*)     eGFR 53 (*)     All other components within normal limits   PROTIME-INR   APTT   URINALYSIS MICROSCOPIC    Narrative:     Specimen Source->Urine   POCT GLUCOSE MONITORING CONTINUOUS        All Lab Results:  Results for orders placed or performed during the hospital encounter of 11/24/22   CBC auto differential   Result Value Ref Range    WBC 7.96 3.90 - 12.70 K/uL    RBC 4.46 (L) 4.60 - 6.20 M/uL    Hemoglobin 13.5 (L) 14.0 - 18.0 g/dL    Hematocrit 40.0 40.0 - 54.0 %    MCV 90 82 - 98 fL    MCH 30.3 27.0 - 31.0 pg    MCHC 33.8 32.0 - 36.0 g/dL    RDW 13.5 11.5 - 14.5 %    Platelets 183 150 - 450 K/uL    MPV 9.8 9.2 - 12.9 fL    Immature Granulocytes 1.9 (H) 0.0 - 0.5 %    Gran # (ANC) 5.6 1.8 - 7.7 K/uL    Immature Grans (Abs) 0.15 (H) 0.00 - 0.04 K/uL    Lymph # 0.9 (L) 1.0 - 4.8 K/uL    Mono # 1.2 (H) 0.3 - 1.0 K/uL    Eos # 0.1 0.0 - 0.5 K/uL    Baso # 0.07 0.00 - 0.20 K/uL    nRBC 0 0 /100 WBC    Gran % 70.3 38.0 - 73.0 %    Lymph % 11.4 (L) 18.0 - 48.0 %    Mono % 14.9 4.0 - 15.0 %    Eosinophil % 0.6 0.0 - 8.0  %    Basophil % 0.9 0.0 - 1.9 %    Differential Method Automated    Urinalysis, Reflex to Urine Culture Urine, Clean Catch    Specimen: Urine   Result Value Ref Range    Specimen UA Urine, Clean Catch     Color, UA Yellow Yellow, Straw, Sita    Appearance, UA Clear Clear    pH, UA 6.0 5.0 - 8.0    Specific Gravity, UA 1.010 1.005 - 1.030    Protein, UA 1+ (A) Negative    Glucose, UA Negative Negative    Ketones, UA Trace (A) Negative    Bilirubin (UA) Negative Negative    Occult Blood UA Trace (A) Negative    Nitrite, UA Negative Negative    Urobilinogen, UA Negative <2.0 EU/dL    Leukocytes, UA Negative Negative   Comprehensive metabolic panel   Result Value Ref Range    Sodium 139 136 - 145 mmol/L    Potassium 3.5 3.5 - 5.1 mmol/L    Chloride 108 95 - 110 mmol/L    CO2 18 (L) 23 - 29 mmol/L    Glucose 78 70 - 110 mg/dL    BUN 24 (H) 8 - 23 mg/dL    Creatinine 1.4 0.5 - 1.4 mg/dL    Calcium 8.8 8.7 - 10.5 mg/dL    Total Protein 7.1 6.0 - 8.4 g/dL    Albumin 3.4 (L) 3.5 - 5.2 g/dL    Total Bilirubin 0.6 0.1 - 1.0 mg/dL    Alkaline Phosphatase 57 55 - 135 U/L    AST 18 10 - 40 U/L    ALT 16 10 - 44 U/L    Anion Gap 13 8 - 16 mmol/L    eGFR 53 (A) >60 mL/min/1.73 m^2   Protime-INR   Result Value Ref Range    Prothrombin Time 11.4 9.0 - 12.5 sec    INR 1.1 0.8 - 1.2   APTT   Result Value Ref Range    aPTT 24.2 21.0 - 32.0 sec   Urinalysis Microscopic   Result Value Ref Range    RBC, UA 2 0 - 4 /hpf    WBC, UA 0 0 - 5 /hpf    WBC Clumps, UA Rare None-Rare    Bacteria None None-Occ /hpf    Hyaline Casts, UA 0 0-1/lpf /lpf    Microscopic Comment SEE COMMENT      Imaging Results:  Imaging Results              CT Hip Without Contrast Left (In process)                      X-Ray Hip 2 or 3 views Left (with Pelvis when performed) (Final result)  Result time 11/24/22 12:22:22      Final result by Lucius Islas MD (11/24/22 12:22:22)                   Impression:      Left femoral neck fracture with mild  offset.      Electronically signed by: Lucius Islas MD  Date:    11/24/2022  Time:    12:22               Narrative:    EXAMINATION:  XR HIP WITH PELVIS WHEN PERFORMED, 2 OR 3 VIEWS LEFT    CLINICAL HISTORY:  Pain in left hip    TECHNIQUE:  Routine radiographs obtained.    COMPARISON:  None    FINDINGS:  There is a complete fracture through the subcapital left femoral neck that is slightly obliquely oriented extending into the more inferior aspect of the femoral neck along its medial aspect.  There is some cortical offset at its superior aspect of approximately 5 mm.    No additional fractures.    No significant arthritic changes.    Arterial vascular calcification.                                       X-Ray Chest AP Portable (Final result)  Result time 11/24/22 12:20:01      Final result by Lucius Islas MD (11/24/22 12:20:01)                   Impression:      No acute findings      Electronically signed by: Lucius Islas MD  Date:    11/24/2022  Time:    12:20               Narrative:    EXAMINATION:  XR CHEST AP PORTABLE    CLINICAL HISTORY:  Unspecified fall, initial encounter    TECHNIQUE:  Single frontal view of the chest was performed.    COMPARISON:  None    FINDINGS:  Postop changes with previous CABG.  Aortic atherosclerosis.  Heart size mildly enlarged.    The lungs are clear.  No pleural effusions.    No acute fractures.  Chronic appearing left 4th posterior rib deformity.  There also is a an old lateral lower left rib cage fracture involving rib 8.    Advanced arthritic changes left glenohumeral joint.                                     The EKG was ordered, reviewed, and independently interpreted by the ED provider.  Interpretation time: 13:22  Rate: 83 BPM  Rhythm: normal sinus rhythm  Interpretation: ST & T wave abnormality, consider lateral ischemia. No STEMI.           The Emergency Provider reviewed the vital signs and test results, which are outlined above.    ED Discussion     12:56 PM:  Discussed pt's case with Dr. Ventura (Orthopedic Surgery) who recommends CT of the L hip and admission to Hospital Medicine, with plan for surgery tomorrow.    1:04 PM: Discussed case with Roberto Carlos rAias NP (Hospital Medicine). Dr. Tompkins agrees with current care and management of pt and accepts admission.   Admitting Service: Hospital Medicine  Admitting Physician: Dr. Tompkins  Admit to: Inpatient Med Surg    1:05 PM: Re-evaluated pt. I have discussed test results, shared treatment plan, and the need for admission with patient and family at bedside. Pt and family express understanding at this time and agree with all information. All questions answered. Pt and family have no further questions or concerns at this time. Pt is ready for admit.         ED Medication(s):  Medications   amLODIPine tablet 10 mg (has no administration in time range)   aspirin chewable tablet 81 mg (has no administration in time range)   levothyroxine tablet 50 mcg (has no administration in time range)   metoprolol succinate (TOPROL-XL) 24 hr tablet 50 mg (has no administration in time range)   tamsulosin 24 hr capsule 0.4 mg (has no administration in time range)   glucose chewable tablet 16 g (has no administration in time range)   glucose chewable tablet 24 g (has no administration in time range)   dextrose 50% injection 12.5 g (has no administration in time range)   dextrose 50% injection 25 g (has no administration in time range)   glucagon (human recombinant) injection 1 mg (has no administration in time range)   insulin aspart U-100 pen 1-10 Units (has no administration in time range)   morphine injection 4 mg (has no administration in time range)   losartan tablet 100 mg (has no administration in time range)   hydroCHLOROthiazide tablet 12.5 mg (has no administration in time range)   albuterol-ipratropium 2.5 mg-0.5 mg/3 mL nebulizer solution 3 mL (has no administration in time range)   melatonin tablet 6 mg (has no administration in  time range)   ondansetron injection 4 mg (has no administration in time range)   senna-docusate 8.6-50 mg per tablet 1 tablet (has no administration in time range)   simethicone chewable tablet 80 mg (has no administration in time range)   aluminum-magnesium hydroxide-simethicone 200-200-20 mg/5 mL suspension 30 mL (has no administration in time range)   acetaminophen tablet 650 mg (has no administration in time range)   naloxone 0.4 mg/mL injection 0.02 mg (has no administration in time range)   dextrose 10% bolus 125 mL (has no administration in time range)   dextrose 10% bolus 250 mL (has no administration in time range)   dextrose 10% bolus 125 mL (has no administration in time range)   dextrose 10% bolus 250 mL (has no administration in time range)   morphine injection 4 mg (4 mg Intravenous Given 11/24/22 1319)   ondansetron injection 4 mg (4 mg Intravenous Given 11/24/22 1320)         New Prescriptions    No medications on file         Medical Decision Making    Medical Decision Making:   Clinical Tests:   Lab Tests: Ordered and Reviewed  Radiological Study: Ordered and Reviewed  Medical Tests: Ordered and Reviewed         Scribe Attestation:   Scribe #1: I performed the above scribed service and the documentation accurately describes the services I performed. I attest to the accuracy of the note.    Attending:   Physician Attestation Statement for Scribe #1: I, Tiffanie Sánchez MD, personally performed the services described in this documentation, as scribed by Abe Weeks, in my presence, and it is both accurate and complete.          Clinical Impression       ICD-10-CM ICD-9-CM   1. Closed displaced fracture of left femoral neck  S72.002A 820.8   2. Left hip pain  M25.552 719.45   3. Fall  W19.XXXA E888.9   4. Preop cardiovascular exam  Z01.810 V72.81   5. Chest pain  R07.9 786.50       Disposition:   Disposition: Admitted  Condition: Cammie Sánchez MD  11/24/22 7086

## 2022-11-24 NOTE — ASSESSMENT & PLAN NOTE
--Hip fracture was demonstrated on X-ray with mild offset. The case was discussed with Dr. Ventura, Orthopedic Surgery, who will plan to operate Friday, 11/25/22.   --He has a recent routine follow-up with his primary cardiologist, Dr. Montana. He has a 6.0% 30-day risk of death or cardiac arrest for this urgent orthopedic procedure, given his CAD history based on the revised cardiac Risk Index. The patient verbalized understanding. NPO past midnight  --analgesia prn  --PT/OT has been ordered postoperatively. We will consult  once the assessment has been completed.

## 2022-11-24 NOTE — ASSESSMENT & PLAN NOTE
Patient's FSGs are controlled on current medication regimen.  Last A1c reviewed- 6.5 on 8/30/22  Current correctional scale  Medium  Maintain anti-hyperglycemic dose as follows-   Antihyperglycemics (From admission, onward)    Start     Stop Route Frequency Ordered    11/24/22 1423  insulin aspart U-100 pen 1-10 Units         -- SubQ Before meals & nightly PRN 11/24/22 1323        Hold Oral hypoglycemics while patient is in the hospital.

## 2022-11-24 NOTE — HPI
Mauro Palacio is a 71-year-old male with a history of HTN, CAD s/p CABG 2006, and DM who presented to ED for further evaluation of left hip pain following a mechanical fall. According to the patient, he lost his balance after hitting a dog kennel, landing on his left side. He denies attributing factors of dizziness, LOC, or light-headedness. Before the incident, he ambulated independently.     In ED, he was x-ray demonstrated a left formal neck fracture. Orthopedic surgery has been consulted. CT-Left hip is pending. Plans for surgical repair in AM. Hospital medicine has been consulted for admission.     The patient is a Full Code. SDM is his wife, Amira.

## 2022-11-25 ENCOUNTER — ANESTHESIA EVENT (OUTPATIENT)
Dept: SURGERY | Facility: HOSPITAL | Age: 72
DRG: 522 | End: 2022-11-25
Payer: MEDICARE

## 2022-11-25 ENCOUNTER — ANESTHESIA (OUTPATIENT)
Dept: SURGERY | Facility: HOSPITAL | Age: 72
DRG: 522 | End: 2022-11-25
Payer: MEDICARE

## 2022-11-25 LAB
ABO + RH BLD: NORMAL
ANION GAP SERPL CALC-SCNC: 14 MMOL/L (ref 8–16)
BASOPHILS # BLD AUTO: 0.07 K/UL (ref 0–0.2)
BASOPHILS NFR BLD: 0.8 % (ref 0–1.9)
BLD GP AB SCN CELLS X3 SERPL QL: NORMAL
BUN SERPL-MCNC: 28 MG/DL (ref 8–23)
CALCIUM SERPL-MCNC: 8.6 MG/DL (ref 8.7–10.5)
CHLORIDE SERPL-SCNC: 102 MMOL/L (ref 95–110)
CO2 SERPL-SCNC: 21 MMOL/L (ref 23–29)
CREAT SERPL-MCNC: 1.5 MG/DL (ref 0.5–1.4)
DIFFERENTIAL METHOD: ABNORMAL
EOSINOPHIL # BLD AUTO: 0 K/UL (ref 0–0.5)
EOSINOPHIL NFR BLD: 0.1 % (ref 0–8)
ERYTHROCYTE [DISTWIDTH] IN BLOOD BY AUTOMATED COUNT: 13.6 % (ref 11.5–14.5)
EST. GFR  (NO RACE VARIABLE): 49 ML/MIN/1.73 M^2
GLUCOSE SERPL-MCNC: 85 MG/DL (ref 70–110)
HCT VFR BLD AUTO: 40.3 % (ref 40–54)
HGB BLD-MCNC: 13.6 G/DL (ref 14–18)
IMM GRANULOCYTES # BLD AUTO: 0.07 K/UL (ref 0–0.04)
IMM GRANULOCYTES NFR BLD AUTO: 0.8 % (ref 0–0.5)
LYMPHOCYTES # BLD AUTO: 0.8 K/UL (ref 1–4.8)
LYMPHOCYTES NFR BLD: 10.1 % (ref 18–48)
MCH RBC QN AUTO: 30.2 PG (ref 27–31)
MCHC RBC AUTO-ENTMCNC: 33.7 G/DL (ref 32–36)
MCV RBC AUTO: 89 FL (ref 82–98)
MONOCYTES # BLD AUTO: 1.2 K/UL (ref 0.3–1)
MONOCYTES NFR BLD: 13.9 % (ref 4–15)
NEUTROPHILS # BLD AUTO: 6.2 K/UL (ref 1.8–7.7)
NEUTROPHILS NFR BLD: 74.3 % (ref 38–73)
NRBC BLD-RTO: 0 /100 WBC
PLATELET # BLD AUTO: 199 K/UL (ref 150–450)
PMV BLD AUTO: 9.9 FL (ref 9.2–12.9)
POCT GLUCOSE: 107 MG/DL (ref 70–110)
POCT GLUCOSE: 113 MG/DL (ref 70–110)
POCT GLUCOSE: 170 MG/DL (ref 70–110)
POCT GLUCOSE: 87 MG/DL (ref 70–110)
POTASSIUM SERPL-SCNC: 3.9 MMOL/L (ref 3.5–5.1)
RBC # BLD AUTO: 4.51 M/UL (ref 4.6–6.2)
SODIUM SERPL-SCNC: 137 MMOL/L (ref 136–145)
WBC # BLD AUTO: 8.34 K/UL (ref 3.9–12.7)

## 2022-11-25 PROCEDURE — 36415 COLL VENOUS BLD VENIPUNCTURE: CPT | Performed by: INTERNAL MEDICINE

## 2022-11-25 PROCEDURE — 99900035 HC TECH TIME PER 15 MIN (STAT)

## 2022-11-25 PROCEDURE — 63600175 PHARM REV CODE 636 W HCPCS: Performed by: NURSE ANESTHETIST, CERTIFIED REGISTERED

## 2022-11-25 PROCEDURE — 36000711: Performed by: ORTHOPAEDIC SURGERY

## 2022-11-25 PROCEDURE — 25000003 PHARM REV CODE 250: Performed by: NURSE PRACTITIONER

## 2022-11-25 PROCEDURE — C9290 INJ, BUPIVACAINE LIPOSOME: HCPCS | Performed by: ORTHOPAEDIC SURGERY

## 2022-11-25 PROCEDURE — 11000001 HC ACUTE MED/SURG PRIVATE ROOM

## 2022-11-25 PROCEDURE — 25000003 PHARM REV CODE 250: Performed by: ORTHOPAEDIC SURGERY

## 2022-11-25 PROCEDURE — 37000009 HC ANESTHESIA EA ADD 15 MINS: Performed by: ORTHOPAEDIC SURGERY

## 2022-11-25 PROCEDURE — 27000221 HC OXYGEN, UP TO 24 HOURS

## 2022-11-25 PROCEDURE — 36415 COLL VENOUS BLD VENIPUNCTURE: CPT | Performed by: ORTHOPAEDIC SURGERY

## 2022-11-25 PROCEDURE — C1713 ANCHOR/SCREW BN/BN,TIS/BN: HCPCS | Performed by: ORTHOPAEDIC SURGERY

## 2022-11-25 PROCEDURE — 27201423 OPTIME MED/SURG SUP & DEVICES STERILE SUPPLY: Performed by: ORTHOPAEDIC SURGERY

## 2022-11-25 PROCEDURE — 86901 BLOOD TYPING SEROLOGIC RH(D): CPT | Performed by: ORTHOPAEDIC SURGERY

## 2022-11-25 PROCEDURE — C1729 CATH, DRAINAGE: HCPCS | Performed by: ORTHOPAEDIC SURGERY

## 2022-11-25 PROCEDURE — 71000033 HC RECOVERY, INTIAL HOUR: Performed by: ORTHOPAEDIC SURGERY

## 2022-11-25 PROCEDURE — 94761 N-INVAS EAR/PLS OXIMETRY MLT: CPT

## 2022-11-25 PROCEDURE — 25000003 PHARM REV CODE 250: Performed by: INTERNAL MEDICINE

## 2022-11-25 PROCEDURE — 63600175 PHARM REV CODE 636 W HCPCS: Performed by: ORTHOPAEDIC SURGERY

## 2022-11-25 PROCEDURE — 25000003 PHARM REV CODE 250: Performed by: NURSE ANESTHETIST, CERTIFIED REGISTERED

## 2022-11-25 PROCEDURE — 37000008 HC ANESTHESIA 1ST 15 MINUTES: Performed by: ORTHOPAEDIC SURGERY

## 2022-11-25 PROCEDURE — 80048 BASIC METABOLIC PNL TOTAL CA: CPT | Performed by: NURSE PRACTITIONER

## 2022-11-25 PROCEDURE — 63600175 PHARM REV CODE 636 W HCPCS: Performed by: NURSE PRACTITIONER

## 2022-11-25 PROCEDURE — 85025 COMPLETE CBC W/AUTO DIFF WBC: CPT | Performed by: NURSE PRACTITIONER

## 2022-11-25 PROCEDURE — C1776 JOINT DEVICE (IMPLANTABLE): HCPCS | Performed by: ORTHOPAEDIC SURGERY

## 2022-11-25 PROCEDURE — 36000710: Performed by: ORTHOPAEDIC SURGERY

## 2022-11-25 DEVICE — IMPLANTABLE DEVICE: Type: IMPLANTABLE DEVICE | Site: HIP | Status: FUNCTIONAL

## 2022-11-25 RX ORDER — SODIUM CHLORIDE 0.9 % (FLUSH) 0.9 %
10 SYRINGE (ML) INJECTION
Status: DISCONTINUED | OUTPATIENT
Start: 2022-11-25 | End: 2022-11-28 | Stop reason: HOSPADM

## 2022-11-25 RX ORDER — HYDROMORPHONE HYDROCHLORIDE 2 MG/ML
0.2 INJECTION, SOLUTION INTRAMUSCULAR; INTRAVENOUS; SUBCUTANEOUS EVERY 5 MIN PRN
Status: DISCONTINUED | OUTPATIENT
Start: 2022-11-25 | End: 2022-11-25 | Stop reason: HOSPADM

## 2022-11-25 RX ORDER — SUCCINYLCHOLINE CHLORIDE 20 MG/ML
INJECTION INTRAMUSCULAR; INTRAVENOUS
Status: DISCONTINUED | OUTPATIENT
Start: 2022-11-25 | End: 2022-11-25

## 2022-11-25 RX ORDER — NAPROXEN SODIUM 220 MG/1
81 TABLET, FILM COATED ORAL 2 TIMES DAILY
Status: DISCONTINUED | OUTPATIENT
Start: 2022-11-26 | End: 2022-11-26

## 2022-11-25 RX ORDER — PHENYLEPHRINE HYDROCHLORIDE 10 MG/ML
INJECTION INTRAVENOUS
Status: DISCONTINUED | OUTPATIENT
Start: 2022-11-25 | End: 2022-11-25

## 2022-11-25 RX ORDER — FAMOTIDINE 20 MG/1
20 TABLET, FILM COATED ORAL 2 TIMES DAILY
Status: DISCONTINUED | OUTPATIENT
Start: 2022-11-25 | End: 2022-11-25 | Stop reason: DRUGHIGH

## 2022-11-25 RX ORDER — DIPHENHYDRAMINE HYDROCHLORIDE 50 MG/ML
12.5 INJECTION INTRAMUSCULAR; INTRAVENOUS
Status: DISCONTINUED | OUTPATIENT
Start: 2022-11-25 | End: 2022-11-25 | Stop reason: HOSPADM

## 2022-11-25 RX ORDER — FENTANYL CITRATE 50 UG/ML
INJECTION, SOLUTION INTRAMUSCULAR; INTRAVENOUS
Status: DISCONTINUED | OUTPATIENT
Start: 2022-11-25 | End: 2022-11-25

## 2022-11-25 RX ORDER — FAMOTIDINE 20 MG/1
20 TABLET, FILM COATED ORAL DAILY
Status: DISCONTINUED | OUTPATIENT
Start: 2022-11-25 | End: 2022-11-28 | Stop reason: HOSPADM

## 2022-11-25 RX ORDER — CEFAZOLIN SODIUM 2 G/50ML
2 SOLUTION INTRAVENOUS ONCE
Status: DISCONTINUED | OUTPATIENT
Start: 2022-11-25 | End: 2022-11-25 | Stop reason: SDUPTHER

## 2022-11-25 RX ORDER — OXYCODONE AND ACETAMINOPHEN 5; 325 MG/1; MG/1
1 TABLET ORAL
Status: DISCONTINUED | OUTPATIENT
Start: 2022-11-25 | End: 2022-11-25 | Stop reason: HOSPADM

## 2022-11-25 RX ORDER — MUPIROCIN 20 MG/G
1 OINTMENT TOPICAL 2 TIMES DAILY
Status: DISCONTINUED | OUTPATIENT
Start: 2022-11-25 | End: 2022-11-28 | Stop reason: HOSPADM

## 2022-11-25 RX ORDER — HYDROCODONE BITARTRATE AND ACETAMINOPHEN 5; 325 MG/1; MG/1
1 TABLET ORAL EVERY 4 HOURS PRN
Status: DISCONTINUED | OUTPATIENT
Start: 2022-11-25 | End: 2022-11-28 | Stop reason: HOSPADM

## 2022-11-25 RX ORDER — TRANEXAMIC ACID 100 MG/ML
1000 INJECTION, SOLUTION INTRAVENOUS ONCE
Status: COMPLETED | OUTPATIENT
Start: 2022-11-25 | End: 2022-11-25

## 2022-11-25 RX ORDER — CEFAZOLIN SODIUM 1 G/3ML
INJECTION, POWDER, FOR SOLUTION INTRAMUSCULAR; INTRAVENOUS
Status: DISCONTINUED | OUTPATIENT
Start: 2022-11-25 | End: 2022-11-25

## 2022-11-25 RX ORDER — HYDROCODONE BITARTRATE AND ACETAMINOPHEN 10; 325 MG/1; MG/1
1 TABLET ORAL EVERY 4 HOURS PRN
Status: DISCONTINUED | OUTPATIENT
Start: 2022-11-25 | End: 2022-11-28 | Stop reason: HOSPADM

## 2022-11-25 RX ORDER — ONDANSETRON 2 MG/ML
4 INJECTION INTRAMUSCULAR; INTRAVENOUS DAILY PRN
Status: DISCONTINUED | OUTPATIENT
Start: 2022-11-25 | End: 2022-11-25 | Stop reason: HOSPADM

## 2022-11-25 RX ORDER — BUPIVACAINE HYDROCHLORIDE AND EPINEPHRINE 2.5; 5 MG/ML; UG/ML
INJECTION, SOLUTION EPIDURAL; INFILTRATION; INTRACAUDAL; PERINEURAL
Status: DISCONTINUED | OUTPATIENT
Start: 2022-11-25 | End: 2022-11-25 | Stop reason: HOSPADM

## 2022-11-25 RX ORDER — PROPOFOL 10 MG/ML
VIAL (ML) INTRAVENOUS
Status: DISCONTINUED | OUTPATIENT
Start: 2022-11-25 | End: 2022-11-25

## 2022-11-25 RX ORDER — ROCURONIUM BROMIDE 10 MG/ML
INJECTION, SOLUTION INTRAVENOUS
Status: DISCONTINUED | OUTPATIENT
Start: 2022-11-25 | End: 2022-11-25

## 2022-11-25 RX ORDER — BUPIVACAINE HYDROCHLORIDE AND EPINEPHRINE 2.5; 5 MG/ML; UG/ML
30 INJECTION, SOLUTION EPIDURAL; INFILTRATION; INTRACAUDAL; PERINEURAL ONCE
Status: DISCONTINUED | OUTPATIENT
Start: 2022-11-25 | End: 2022-11-25 | Stop reason: HOSPADM

## 2022-11-25 RX ORDER — SODIUM CHLORIDE 9 MG/ML
INJECTION, SOLUTION INTRAVENOUS ONCE
Status: DISCONTINUED | OUTPATIENT
Start: 2022-11-25 | End: 2022-11-27

## 2022-11-25 RX ORDER — VANCOMYCIN HYDROCHLORIDE 1 G/20ML
INJECTION, POWDER, LYOPHILIZED, FOR SOLUTION INTRAVENOUS
Status: DISCONTINUED | OUTPATIENT
Start: 2022-11-25 | End: 2022-11-25 | Stop reason: HOSPADM

## 2022-11-25 RX ORDER — LIDOCAINE HYDROCHLORIDE 20 MG/ML
INJECTION INTRAVENOUS
Status: DISCONTINUED | OUTPATIENT
Start: 2022-11-25 | End: 2022-11-25

## 2022-11-25 RX ORDER — CEFAZOLIN SODIUM 2 G/50ML
2 SOLUTION INTRAVENOUS
Status: DISPENSED | OUTPATIENT
Start: 2022-11-25 | End: 2022-11-26

## 2022-11-25 RX ORDER — DEXAMETHASONE SODIUM PHOSPHATE 4 MG/ML
INJECTION, SOLUTION INTRA-ARTICULAR; INTRALESIONAL; INTRAMUSCULAR; INTRAVENOUS; SOFT TISSUE
Status: DISCONTINUED | OUTPATIENT
Start: 2022-11-25 | End: 2022-11-25

## 2022-11-25 RX ORDER — SODIUM CHLORIDE 9 MG/ML
INJECTION, SOLUTION INTRAVENOUS CONTINUOUS
Status: DISCONTINUED | OUTPATIENT
Start: 2022-11-25 | End: 2022-11-25

## 2022-11-25 RX ORDER — ONDANSETRON 2 MG/ML
INJECTION INTRAMUSCULAR; INTRAVENOUS
Status: DISCONTINUED | OUTPATIENT
Start: 2022-11-25 | End: 2022-11-25

## 2022-11-25 RX ADMIN — PHENYLEPHRINE HYDROCHLORIDE 200 MCG: 10 INJECTION INTRAVENOUS at 01:11

## 2022-11-25 RX ADMIN — ROCURONIUM BROMIDE 5 MG: 10 INJECTION, SOLUTION INTRAVENOUS at 12:11

## 2022-11-25 RX ADMIN — TRANEXAMIC ACID 1000 MG: 100 INJECTION, SOLUTION INTRAVENOUS at 12:11

## 2022-11-25 RX ADMIN — DEXAMETHASONE SODIUM PHOSPHATE 4 MG: 4 INJECTION, SOLUTION INTRAMUSCULAR; INTRAVENOUS at 01:11

## 2022-11-25 RX ADMIN — SUCCINYLCHOLINE CHLORIDE 100 MG: 20 INJECTION, SOLUTION INTRAMUSCULAR; INTRAVENOUS at 12:11

## 2022-11-25 RX ADMIN — LIDOCAINE HYDROCHLORIDE 50 MG: 20 INJECTION, SOLUTION INTRAVENOUS at 12:11

## 2022-11-25 RX ADMIN — PHENYLEPHRINE HYDROCHLORIDE 100 MCG: 10 INJECTION INTRAVENOUS at 01:11

## 2022-11-25 RX ADMIN — TAMSULOSIN HYDROCHLORIDE 0.4 MG: 0.4 CAPSULE ORAL at 09:11

## 2022-11-25 RX ADMIN — ONDANSETRON 4 MG: 2 INJECTION, SOLUTION INTRAMUSCULAR; INTRAVENOUS at 01:11

## 2022-11-25 RX ADMIN — PROPOFOL 90 MG: 10 INJECTION, EMULSION INTRAVENOUS at 12:11

## 2022-11-25 RX ADMIN — CEFAZOLIN 2 G: 1 INJECTION, POWDER, FOR SOLUTION INTRAMUSCULAR; INTRAVENOUS at 12:11

## 2022-11-25 RX ADMIN — ROCURONIUM BROMIDE 10 MG: 10 INJECTION, SOLUTION INTRAVENOUS at 01:11

## 2022-11-25 RX ADMIN — HYDROCHLOROTHIAZIDE 12.5 MG: 12.5 TABLET ORAL at 09:11

## 2022-11-25 RX ADMIN — MUPIROCIN 1 G: 20 OINTMENT TOPICAL at 09:11

## 2022-11-25 RX ADMIN — LEVOTHYROXINE SODIUM 50 MCG: 50 TABLET ORAL at 05:11

## 2022-11-25 RX ADMIN — FAMOTIDINE 20 MG: 20 TABLET ORAL at 04:11

## 2022-11-25 RX ADMIN — AMLODIPINE BESYLATE 10 MG: 10 TABLET ORAL at 09:11

## 2022-11-25 RX ADMIN — CEFAZOLIN SODIUM 2 G: 2 SOLUTION INTRAVENOUS at 09:11

## 2022-11-25 RX ADMIN — MORPHINE SULFATE 4 MG: 4 INJECTION INTRAVENOUS at 05:11

## 2022-11-25 RX ADMIN — ROCURONIUM BROMIDE 25 MG: 10 INJECTION, SOLUTION INTRAVENOUS at 12:11

## 2022-11-25 RX ADMIN — METOPROLOL SUCCINATE 50 MG: 50 TABLET, EXTENDED RELEASE ORAL at 09:11

## 2022-11-25 RX ADMIN — PHENYLEPHRINE HYDROCHLORIDE 200 MCG: 10 INJECTION INTRAVENOUS at 02:11

## 2022-11-25 RX ADMIN — SODIUM CHLORIDE, SODIUM LACTATE, POTASSIUM CHLORIDE, AND CALCIUM CHLORIDE: .6; .31; .03; .02 INJECTION, SOLUTION INTRAVENOUS at 12:11

## 2022-11-25 RX ADMIN — PHENYLEPHRINE HYDROCHLORIDE 100 MCG: 10 INJECTION INTRAVENOUS at 02:11

## 2022-11-25 RX ADMIN — FENTANYL CITRATE 50 MCG: 50 INJECTION, SOLUTION INTRAMUSCULAR; INTRAVENOUS at 03:11

## 2022-11-25 RX ADMIN — GLYCOPYRROLATE 0.2 MG: 0.2 INJECTION, SOLUTION INTRAMUSCULAR; INTRAVENOUS at 01:11

## 2022-11-25 RX ADMIN — SUGAMMADEX 200 MG: 100 INJECTION, SOLUTION INTRAVENOUS at 02:11

## 2022-11-25 NOTE — HOSPITAL COURSE
71 year old male, Hx: HTN, CAD, CABG (2006) and DM,  admitted for Left femoral neck fracture, s/p total hip arthroplasty per orthopedic on 11/25/22, hemovac removed per orthopedic. Patient vitals stable, losartan continues to be held. Pain well controlled with current regimen pre-operatively. PT/OT to evaluate today, patient prefers to discharge home with HH. Patient endorses constipation, denies flatus, has hypoactive bowel sounds, adjusted bowel regimen, ordered PRN suppository, if has not had a bowel movement by afternoon will have suppository administered. HH orders placed, planning for discharge pending bowel movement.

## 2022-11-25 NOTE — PLAN OF CARE
Pt remains free from falls/injuries this shift. Safety precautions maintained. Pain managed with pain medication. No s/s of acute distress noted. VSS. L hip KAREN dressing in place. Accordion drain in place. Continuing with plan of care. Chart check completed.

## 2022-11-25 NOTE — ANESTHESIA PREPROCEDURE EVALUATION
11/25/2022  Nahum Palacio is a 71 y.o., male past history significant for coronary artery disease status post CABG in 2006, diabetes, hypertension, and hypothyroid who is admitted for a left femoral neck fracture following a ground level fall yesterday. He takes aspirin 81mg daily.  He has been NPO since before midnight    Patient Active Problem List   Diagnosis    Coronary artery disease    Diabetes mellitus    Hypertension    Hypothyroid    Closed displaced fracture of left femoral neck     Past Medical History:   Diagnosis Date    Coronary artery disease     Diabetes mellitus     Hypertension      No past surgical history on file.      Chemistry        Component Value Date/Time     11/25/2022 0626    K 3.9 11/25/2022 0626     11/25/2022 0626    CO2 21 (L) 11/25/2022 0626    BUN 28 (H) 11/25/2022 0626    CREATININE 1.5 (H) 11/25/2022 0626    GLU 85 11/25/2022 0626        Component Value Date/Time    CALCIUM 8.6 (L) 11/25/2022 0626    ALKPHOS 57 11/24/2022 1227    AST 18 11/24/2022 1227    ALT 16 11/24/2022 1227    BILITOT 0.6 11/24/2022 1227        Lab Results   Component Value Date    WBC 8.34 11/25/2022    HGB 13.6 (L) 11/25/2022    HCT 40.3 11/25/2022    MCV 89 11/25/2022     11/25/2022       Pre-op Assessment    I have reviewed the Patient Summary Reports.    I have reviewed the NPO Status.   I have reviewed the Medications.     Review of Systems  Anesthesia Hx:  No problems with previous Anesthesia  History of prior surgery of interest to airway management or planning:  Denies Personal Hx of Anesthesia complications.   Social:  Non-Smoker, No Alcohol Use    Hematology/Oncology:  Hematology Normal   Oncology Normal     EENT/Dental:   chronic allergic rhinitis Patient with active nasal congestion, intermittent wet cough; denies any fever, chills, body aches, SOB, etc.    Cardiovascular:   Hypertension, well controlled CAD  CABG/stent (2006)   Denies CHF.  Denies HATCH. ECG has been reviewed. --Last seen by Dr. Montana (cardiology) on 10/18/22. He was without cardiac concerns or symptoms. Denies angina.  He has a 6.0% 30-day risk of death or cardiac arrest for this urgent orthopedic procedure, given his CAD history based on the revised cardiac Risk Index. The patient verbalized understanding.    EKG (11/24/22):  Normal sinus rhythm   ST and T wave abnormality, consider lateral ischemia   Abnormal ECG   No previous ECGs available    Pulmonary:  Pulmonary Normal    Renal/:   ESTRELLITA    Musculoskeletal:   Left hip fx   Neurological:  Neurology Normal    Endocrine:   Diabetes, well controlled, type 2 Hypothyroidism    Psych:  Psychiatric Normal           Physical Exam  General: Well nourished, Cooperative, Alert and Oriented    Airway:  Mallampati: III   Mouth Opening: Normal  TM Distance: Normal  Tongue: Normal  Neck ROM: Normal ROM    Dental:  Intact  Patient denies any currently loose or chipped teeth; Patient denies any removable dental appliances  Chest/Lungs:  Clear to auscultation        Anesthesia Plan  Type of Anesthesia, risks & benefits discussed:    Anesthesia Type: Gen ETT  Intra-op Monitoring Plan: Standard ASA Monitors  Post Op Pain Control Plan: multimodal analgesia and IV/PO Opioids PRN  Induction:  IV  Airway Plan: Direct  Informed Consent: Informed consent signed with the Patient and all parties understand the risks and agree with anesthesia plan.  All questions answered.   ASA Score: 3    Ready For Surgery From Anesthesia Perspective.     .

## 2022-11-25 NOTE — PT/OT/SLP PROGRESS
Occupational Therapy      Patient Name:  Nahum Palacio   MRN:  [id number]    Eval initiated  via chart review. Pt currently in procedure. Will follow up next O.T. visit  11/25/2022  11:15  Janay Manuel OT

## 2022-11-25 NOTE — OP NOTE
Operative Report  Surgery: 11/25/2022    Surgeon: Félix Ventura M.D.    Assistants:  Joanna Aguillon PA-C.  Her skilled hands were critical to completion of this case from assistance with positinoing, retraction during exposure, bony preparations, implantation and closure    Preoperative Diagnosis:  left femoral neck fracture    Postoperative Diagnosis:  same    Procedure:  Left total hip arthroplasty, direct anterior approach    EBL:  300    Drains: hv x 1    Implants:  DJO Empowr 54mm cup, 40mm screw, 9 taperfill lateral offset stem, 40mm head -4 neck option    Indications:  Patient has a displaced femoral neck fracture.  Risks, benefits, and alternatives total hip arthroplasty were discussed in great detail.  Specific risks including pain, bleeding, infection, nerve/vessel damage, stiffness, thigh numbness, loosening, leg length difference, dislocation, and need for additional procedures were all discussed in great detail. They expressed understanding and elected to proceed.  Informed consent was obtained and I marked the operative limb prior to transfer to the OR.      Procedure in detail:  After induction of anesthesia the patient was positioned on the [place] table with traction boots applied to the bilateral lower extremities.  Dedicated imaging of each hip and the pelvis were obtained for templating purposes.  The operative thigh was prepped and draped in a standard fashion starting with a chlorhexidine and alcohol pre-scrub, chloraprep was then used on the skin.  All skin was covered with drape or ioban.  Appropriate time-out was performed, confirming correct patient, side, site and procedure to be performed.   IV antibiotics were administered prior to start.  IV TXA was administered.     I began by performing a standard direct anterior approach to the hip.  Skin was incised distal and lateral to the ASIS directed toward Gerdy's tubercle.  Subcutaneous tissue and fascia over the TFL was incised in line with the  incision.  TFL was swept posteriorly.  The floor of the sheath was opened, crossing ascending vessels were ligated and cut, and precapsular fat was excised.  Standard anterior capsulotomy and capsulectomy was performed.  Retractors were placed around the neck.  Neck cut was made and head was extracted uneventfully.  Retractors were placed around the acetabulum.  Labrum and pulvinar was excised.  Acetabulum was reamed up to 1mm under the final implanted cup size.  Cup was impacted with appropriate anteversion and abduction, confirmed fluoroscopically.  Screw(s) were placed in the posterior superior quadrant and final liner was placed and impacted.  Retractors were then placed around the femur and the femur was delivered with external rotation, extension, and adduction.  Release was performed to allow adequate delivery of the femur.  The femur was prepared in the standard fashion and broached up to the final implant size with appropriate fit, fill, and rotational stability.  Hip was reduced, trialed and found to be stable and restore lengths acceptably.  Hip was dislocated and final stem and head impacted.  Stem was stable in the femur.  Hip was verified to be stable and leg lengths restored acceptably, confirmed fluoroscopically.      Wound was irrigated. Good hemostasis was obtained.   Exparel cocktail was injected into the periosteum, capsule and surrounding musculature.  1 gram of vancomycin was placed in the wound. Drain was placed. Fascia was closed and subcutaneous tissues closed with interrupted sutures.  Subcuticular quill stitch was used in the skin.  Dermabond and sterile dressings applied over this.  Patient tolerated the procedure well.   All counts were correct at the end of the case.  No complications were noted.  I was present an performed all portions of the procedure.    Postoperative Plan:  Weightbearing:  as tolerated  Precautions:  anterior hip precautions  DVT prophylaxis: ASA BID  Antibiotics:  perioperative only  Follow-up: 2 weeks.      Félix Ventura M.D.  Orthopaedic Surgery  [facility]  [phone]

## 2022-11-25 NOTE — SUBJECTIVE & OBJECTIVE
Interval History: Left JOHAN planned for today, Losartan held prior to surgery, resume when appropriate.     Review of Systems   Constitutional:  Positive for activity change and fatigue. Negative for appetite change, chills, diaphoresis, fever and unexpected weight change.   HENT:  Negative for congestion, hearing loss, nosebleeds, postnasal drip, rhinorrhea and trouble swallowing.    Eyes:  Negative for discharge and visual disturbance.   Respiratory:  Negative for cough, chest tightness and shortness of breath.    Cardiovascular:  Negative for chest pain, palpitations and leg swelling.   Gastrointestinal:  Negative for abdominal distention, abdominal pain, constipation, diarrhea, nausea and vomiting.   Endocrine: Negative for cold intolerance and heat intolerance.   Genitourinary:  Negative for difficulty urinating, dysuria, frequency and hematuria.   Musculoskeletal:  Positive for arthralgias, back pain, gait problem and joint swelling. Negative for myalgias.   Skin: Negative.    Neurological:  Negative for dizziness, weakness, light-headedness and headaches.   Hematological:  Negative for adenopathy. Does not bruise/bleed easily.   Psychiatric/Behavioral:  Negative for agitation, behavioral problems and confusion. The patient is nervous/anxious.    Objective:     Vital Signs (Most Recent):  Temp: 98.3 °F (36.8 °C) (11/25/22 0730)  Pulse: 75 (11/25/22 0730)  Resp: 18 (11/25/22 0730)  BP: 125/66 (11/25/22 0730)  SpO2: 95 % (11/25/22 0730) Vital Signs (24h Range):  Temp:  [98.1 °F (36.7 °C)-98.7 °F (37.1 °C)] 98.3 °F (36.8 °C)  Pulse:  [75-86] 75  Resp:  [14-24] 18  SpO2:  [90 %-96 %] 95 %  BP: (124-149)/(62-79) 125/66     Weight: 76.5 kg (168 lb 10.4 oz)  Body mass index is 27.22 kg/m².    Intake/Output Summary (Last 24 hours) at 11/25/2022 1036  Last data filed at 11/25/2022 0705  Gross per 24 hour   Intake --   Output 600 ml   Net -600 ml      Physical Exam  Vitals and nursing note reviewed.   Constitutional:        General: He is not in acute distress.     Appearance: He is well-developed. He is ill-appearing. He is not diaphoretic.   HENT:      Head: Normocephalic and atraumatic.      Right Ear: Hearing and external ear normal.      Left Ear: Hearing and external ear normal.      Nose: Nose normal. No mucosal edema or rhinorrhea.      Mouth/Throat:      Pharynx: Uvula midline.   Eyes:      General:         Right eye: No discharge.         Left eye: No discharge.      Conjunctiva/sclera: Conjunctivae normal.      Right eye: No chemosis.     Left eye: No chemosis.     Pupils: Pupils are equal, round, and reactive to light.   Neck:      Thyroid: No thyroid mass or thyromegaly.      Trachea: Trachea normal.   Cardiovascular:      Rate and Rhythm: Normal rate and regular rhythm.      Pulses:           Dorsalis pedis pulses are 2+ on the right side and 2+ on the left side.      Heart sounds: Normal heart sounds. No murmur heard.  Pulmonary:      Effort: Pulmonary effort is normal. No respiratory distress.      Breath sounds: Normal breath sounds. No decreased breath sounds or wheezing.   Abdominal:      General: Bowel sounds are normal. There is no distension.      Palpations: Abdomen is soft.      Tenderness: There is no abdominal tenderness.   Musculoskeletal:      Cervical back: Normal range of motion and neck supple.      Left hip: Deformity, tenderness and bony tenderness present. Decreased range of motion.   Lymphadenopathy:      Cervical: No cervical adenopathy.      Upper Body:      Right upper body: No supraclavicular adenopathy.      Left upper body: No supraclavicular adenopathy.   Skin:     General: Skin is warm and dry.      Capillary Refill: Capillary refill takes less than 2 seconds.      Findings: No rash.   Neurological:      Mental Status: He is alert and oriented to person, place, and time.   Psychiatric:         Mood and Affect: Mood is not anxious.         Speech: Speech normal.         Behavior: Behavior  normal.         Thought Content: Thought content normal.         Judgment: Judgment normal.       Significant Labs: All pertinent labs within the past 24 hours have been reviewed.  CBC:   Recent Labs   Lab 11/24/22  1145 11/25/22  0626   WBC 7.96 8.34   HGB 13.5* 13.6*   HCT 40.0 40.3    199     CMP:   Recent Labs   Lab 11/24/22  1227 11/25/22  0626    137   K 3.5 3.9    102   CO2 18* 21*   GLU 78 85   BUN 24* 28*   CREATININE 1.4 1.5*   CALCIUM 8.8 8.6*   PROT 7.1  --    ALBUMIN 3.4*  --    BILITOT 0.6  --    ALKPHOS 57  --    AST 18  --    ALT 16  --    ANIONGAP 13 14       Significant Imaging: I have reviewed all pertinent imaging results/findings within the past 24 hours.

## 2022-11-25 NOTE — CONSULTS
[facility]  Orthopedics  Consult Note    Patient Name: Nahum Palacio  MRN: [id number]  Admission Date: 11/24/2022  Hospital Length of Stay: 1 days  Attending Provider: Loren Kulkarni MD  Primary Care Provider: Eric Foster MD    Patient information was obtained from patient, spouse/SO, past medical records, ER records, and primary team.     Inpatient consult to Orthopedic Surgery  Consult performed by: Fox Briggs PA-C  Consult ordered by: Tiffanie Sánchez MD      Subjective:     Principal Problem:Closed left hip fracture, initial encounter    Chief Complaint:   Chief Complaint   Patient presents with    Fall     Left hip pain after a ground level fall. Denies LOC, no head trauma. 100 mcg of Fentanyl given prior to transport.        HPI: Nahum Palacio is a 71-year-old male with past history significant for coronary artery disease status post CABG in 2006, diabetes, hypertension, and hypothyroid who is admitted for a left femoral neck fracture following a ground level fall yesterday.  Patient reports immediate pain and inability to bear weight.  He denies numbness or tingling in the extremity.  He denies head trauma or loss of consciousness.  He takes aspirin 81mg daily.  He has been NPO since before midnight    Past Medical History:   Diagnosis Date    Coronary artery disease     Diabetes mellitus     Hypertension        No past surgical history on file.    Review of patient's allergies indicates:   Allergen Reactions    Statins-hmg-coa reductase inhibitors Other (See Comments)     Muscle aches  Joint Stiffness       Current Facility-Administered Medications   Medication    acetaminophen tablet 650 mg    albuterol-ipratropium 2.5 mg-0.5 mg/3 mL nebulizer solution 3 mL    aluminum-magnesium hydroxide-simethicone 200-200-20 mg/5 mL suspension 30 mL    amLODIPine tablet 10 mg    dextrose 10% bolus 125 mL    dextrose 10% bolus 125 mL    dextrose 10% bolus 250 mL    dextrose 10% bolus 250 mL     glucagon (human recombinant) injection 1 mg    glucose chewable tablet 16 g    glucose chewable tablet 24 g    hydroCHLOROthiazide tablet 12.5 mg    insulin aspart U-100 pen 1-10 Units    levothyroxine tablet 50 mcg    melatonin tablet 6 mg    metoprolol succinate (TOPROL-XL) 24 hr tablet 50 mg    morphine injection 4 mg    naloxone 0.4 mg/mL injection 0.02 mg    ondansetron injection 4 mg    polyethylene glycol packet 17 g    senna-docusate 8.6-50 mg per tablet 1 tablet    simethicone chewable tablet 80 mg    tamsulosin 24 hr capsule 0.4 mg     Family History    None       Tobacco Use    Smoking status: Not on file    Smokeless tobacco: Not on file   Substance and Sexual Activity    Alcohol use: Not on file    Drug use: Not on file    Sexual activity: Not on file     Review of Systems   Constitutional: Negative for chills, decreased appetite, fever and weight loss.   HENT:  Negative for congestion, hoarse voice and sore throat.    Eyes:  Negative for blurred vision, double vision, vision loss in left eye and vision loss in right eye.   Cardiovascular:  Negative for chest pain, palpitations and syncope.   Respiratory:  Negative for cough, shortness of breath and wheezing.    Endocrine: Negative for cold intolerance and heat intolerance.   Hematologic/Lymphatic: Negative for bleeding problem. Does not bruise/bleed easily.   Skin:  Negative for dry skin, flushing, itching and suspicious lesions.   Musculoskeletal:  Positive for falls, joint pain and joint swelling.   Gastrointestinal:  Negative for abdominal pain, diarrhea, nausea and vomiting.   Genitourinary:  Negative for dysuria, frequency and urgency.   Neurological:  Negative for dizziness, headaches, numbness and paresthesias.   Psychiatric/Behavioral:  Negative for altered mental status and memory loss.    Objective:     Vital Signs (Most Recent):  Temp: 98.3 °F (36.8 °C) (11/25/22 0730)  Pulse: 75 (11/25/22 0730)  Resp: 18 (11/25/22 0730)  BP: 125/66  "(11/25/22 0730)  SpO2: 95 % (11/25/22 0730) Vital Signs (24h Range):  Temp:  [98.1 °F (36.7 °C)-98.7 °F (37.1 °C)] 98.3 °F (36.8 °C)  Pulse:  [75-86] 75  Resp:  [14-24] 18  SpO2:  [90 %-96 %] 95 %  BP: (124-149)/(62-79) 125/66     Weight: 76.5 kg (168 lb 10.4 oz)  Height: 5' 6" (167.6 cm)  Body mass index is 27.22 kg/m².      Intake/Output Summary (Last 24 hours) at 11/25/2022 0833  Last data filed at 11/25/2022 0705  Gross per 24 hour   Intake --   Output 600 ml   Net -600 ml       Ortho/SPM Exam  Left hip:  Extremity is similar in length and rotation to the contralateral side   Skin is intact   Moderate edema   Moderate TTP   Pain increases with logroll   ROM not tested   Calf and compartments are soft and compressible   Motor exam normal   Sensation and pulses intact   Cap refill brisk    GEN: Well developed, well nourished male. AAOX3. No acute distress.   Head: Normocephalic, atraumatic.   Eyes: DEEDEE  Neck: Trachea is midline, no adenopathy  Resp: Breathing unlabored.  Neuro: Motor function normal, Cranial nerves intact  Psych: Mood and affect appropriate.      Significant Labs:   Recent Lab Results  (Last 5 results in the past 24 hours)        11/25/22  0626   11/25/22  0611   11/24/22  1939   11/24/22  1229   11/24/22  1227        Albumin         3.4       Alkaline Phosphatase         57       ALT         16       Anion Gap 14         13       aPTT       24.2  Comment: aPTT therapeutic range = 39-69 seconds         AST         18       Baso # 0.07               Basophil % 0.8               BILIRUBIN TOTAL         0.6  Comment: For infants and newborns, interpretation of results should be based  on gestational age, weight and in agreement with clinical  observations.    Premature Infant recommended reference ranges:  Up to 24 hours.............<8.0 mg/dL  Up to 48 hours............<12.0 mg/dL  3-5 days..................<15.0 mg/dL  6-29 days.................<15.0 mg/dL         BUN 28         24       Calcium " 8.6         8.8       Chloride 102         108       CO2 21         18       Creatinine 1.5         1.4       Differential Method Automated               eGFR 49         53       Eos # 0.0               Eosinophil % 0.1               Glucose 85         78       Gran # (ANC) 6.2               Gran % 74.3               Hematocrit 40.3               Hemoglobin 13.6               Immature Grans (Abs) 0.07  Comment: Mild elevation in immature granulocytes is non specific and   can be seen in a variety of conditions including stress response,   acute inflammation, trauma and pregnancy. Correlation with other   laboratory and clinical findings is essential.                 Immature Granulocytes 0.8               INR       1.1  Comment: Coumadin Therapy:  2.0 - 3.0 for INR for all indicators except mechanical heart valves  and antiphospholipid syndromes which should use 2.5 - 3.5.           Lymph # 0.8               Lymph % 10.1               MCH 30.2               MCHC 33.7               MCV 89               Mono # 1.2               Mono % 13.9               MPV 9.9               nRBC 0               Platelets 199               POCT Glucose   87   107           Potassium 3.9         3.5       PROTEIN TOTAL         7.1       Protime       11.4         RBC 4.51               RDW 13.6               Sodium 137         139       WBC 8.34                                    All pertinent labs within the past 24 hours have been reviewed.    Significant Imaging: CT: I have reviewed all pertinent results/findings and my personal findings are:  CT of the left hip shows oblique fracture through the femoral neck    Assessment/Plan:     Active Diagnoses:    Diagnosis Date Noted POA    PRINCIPAL PROBLEM:  Closed left hip fracture, initial encounter [S72.002A]  Yes    Coronary artery disease [I25.10]  Yes    Diabetes mellitus [E11.9]  Yes    Hypertension [I10]  Yes    Hypothyroid [E03.9]  Yes      Problems Resolved During this Admission:        Assessment:   71-year-old male with past history significant for coronary artery disease status post CABG in 2006, diabetes, hypertension, and hypothyroid who is admitted for a left femoral neck fracture following a ground level fall yesterday    Plan:   Discussed operative versus nonoperative management the patient.  Recommended operative intervention.  Dr. Ventura plans to take the patient operating room later today for left total hip arthroplasty.  Discussed risks and benefits of the surgery with the patient.  He is at elevated risk due to his age and history of coronary artery disease.  All questions were asked and answered.  Patient would like to proceed with the surgery.  Keep patient NPO.  Nonweightbearing to the left lower extremity.  Hold aspirin until after surgery    Fox Briggs PA-C  Orthopedics  [facility]     ATTENDING ADDENDUM  I saw and evaluated this patient and agree with the above assessment.  Patient has left femoral neck  fracture.  Exam notable for left hip pain with ROM.  I had along discussion with them regarding their diagnosis, and options for further diagnosis and treatment options.  Recommendations:    Activity:  NWB  Immobilization: bedrest  Antibiotics: periop  Diet: NPO for OR  Analgesia: PO -> IVP for BTP  Angicoagulation: hold for OR  Decision for surgery:  After having an extensive discussion regarding risks, benefits and alternatives to surgery, patient elected to proceed.  Plan for surgery ASAP.      Félix Ventura M.D.  Orthopaedic Surgery  [facility]

## 2022-11-25 NOTE — TRANSFER OF CARE
"Anesthesia Transfer of Care Note    Patient: Nahum Palacio    Procedure(s) Performed: Procedure(s) (LRB):  ARTHROPLASTY (Left)    Patient location: PACU    Anesthesia Type: general    Transport from OR: Transported from OR on room air with adequate spontaneous ventilation    Post pain: adequate analgesia    Post assessment: no apparent anesthetic complications and tolerated procedure well    Post vital signs: stable    Level of consciousness: awake, alert and oriented    Nausea/Vomiting: no nausea/vomiting    Complications: none    Transfer of care protocol was followed      Last vitals:   Visit Vitals  /66 (BP Location: Left arm, Patient Position: Lying)   Pulse 75   Temp 36.8 °C (98.3 °F) (Oral)   Resp 18   Ht 5' 6" (1.676 m)   Wt 76.5 kg (168 lb 10.4 oz)   SpO2 95%   BMI 27.22 kg/m²     "

## 2022-11-25 NOTE — PT/OT/SLP PROGRESS
Physical Therapy      Patient Name:  Nahum Palacio   MRN:  [id number]    Chart review via epic completed however patient not seen today secondary to in surgical procedure Will follow-up as able/appropriate.

## 2022-11-25 NOTE — PROGRESS NOTES
[facility] ([facility])  [facility] Medicine  Progress Note    Patient Name: Nahum Palacio  MRN: [id number]  Patient Class: IP- Inpatient   Admission Date: 11/24/2022  Length of Stay: 1 days  Attending Physician: Loren Kulkarni MD  Primary Care Provider: Eric Foster MD        Subjective:     Principal Problem:Closed displaced fracture of left femoral neck        HPI:  Mauro Palacio is a 71-year-old male with a history of HTN, CAD s/p CABG 2006, and DM who presented to ED for further evaluation of left hip pain following a mechanical fall. According to the patient, he lost his balance after hitting a dog kennel, landing on his left side. He denies attributing factors of dizziness, LOC, or light-headedness. Before the incident, he ambulated independently.     In ED, he was x-ray demonstrated a left formal neck fracture. Orthopedic surgery has been consulted. CT-Left hip is pending. Plans for surgical repair in AM. Hospital medicine has been consulted for admission.     The patient is a Full Code. SDM is his wife, Amira.         Overview/Hospital Course:  71 year old male, Hx: HTN, CAD, CABG (2006) and DM,  admitted for Left femoral neck fracture, planned for total hip arthroplasty per orthopedic on 11/25/22. Patient vitals stable overnight, losartan held prior to surgery, will resume when appropriate. Pain well controlled with current regimen pre-operatively.       Interval History: Left JOHAN planned for today, Losartan held prior to surgery, resume when appropriate.     Review of Systems   Constitutional:  Positive for activity change and fatigue. Negative for appetite change, chills, diaphoresis, fever and unexpected weight change.   HENT:  Negative for congestion, hearing loss, nosebleeds, postnasal drip, rhinorrhea and trouble swallowing.    Eyes:  Negative for discharge and visual disturbance.   Respiratory:  Negative for cough, chest tightness and shortness of breath.    Cardiovascular:  Negative  for chest pain, palpitations and leg swelling.   Gastrointestinal:  Negative for abdominal distention, abdominal pain, constipation, diarrhea, nausea and vomiting.   Endocrine: Negative for cold intolerance and heat intolerance.   Genitourinary:  Negative for difficulty urinating, dysuria, frequency and hematuria.   Musculoskeletal:  Positive for arthralgias, back pain, gait problem and joint swelling. Negative for myalgias.   Skin: Negative.    Neurological:  Negative for dizziness, weakness, light-headedness and headaches.   Hematological:  Negative for adenopathy. Does not bruise/bleed easily.   Psychiatric/Behavioral:  Negative for agitation, behavioral problems and confusion. The patient is nervous/anxious.    Objective:     Vital Signs (Most Recent):  Temp: 98.3 °F (36.8 °C) (11/25/22 0730)  Pulse: 75 (11/25/22 0730)  Resp: 18 (11/25/22 0730)  BP: 125/66 (11/25/22 0730)  SpO2: 95 % (11/25/22 0730) Vital Signs (24h Range):  Temp:  [98.1 °F (36.7 °C)-98.7 °F (37.1 °C)] 98.3 °F (36.8 °C)  Pulse:  [75-86] 75  Resp:  [14-24] 18  SpO2:  [90 %-96 %] 95 %  BP: (124-149)/(62-79) 125/66     Weight: 76.5 kg (168 lb 10.4 oz)  Body mass index is 27.22 kg/m².    Intake/Output Summary (Last 24 hours) at 11/25/2022 1036  Last data filed at 11/25/2022 0705  Gross per 24 hour   Intake --   Output 600 ml   Net -600 ml      Physical Exam  Vitals and nursing note reviewed.   Constitutional:       General: He is not in acute distress.     Appearance: He is well-developed. He is ill-appearing. He is not diaphoretic.   HENT:      Head: Normocephalic and atraumatic.      Right Ear: Hearing and external ear normal.      Left Ear: Hearing and external ear normal.      Nose: Nose normal. No mucosal edema or rhinorrhea.      Mouth/Throat:      Pharynx: Uvula midline.   Eyes:      General:         Right eye: No discharge.         Left eye: No discharge.      Conjunctiva/sclera: Conjunctivae normal.      Right eye: No chemosis.     Left  eye: No chemosis.     Pupils: Pupils are equal, round, and reactive to light.   Neck:      Thyroid: No thyroid mass or thyromegaly.      Trachea: Trachea normal.   Cardiovascular:      Rate and Rhythm: Normal rate and regular rhythm.      Pulses:           Dorsalis pedis pulses are 2+ on the right side and 2+ on the left side.      Heart sounds: Normal heart sounds. No murmur heard.  Pulmonary:      Effort: Pulmonary effort is normal. No respiratory distress.      Breath sounds: Normal breath sounds. No decreased breath sounds or wheezing.   Abdominal:      General: Bowel sounds are normal. There is no distension.      Palpations: Abdomen is soft.      Tenderness: There is no abdominal tenderness.   Musculoskeletal:      Cervical back: Normal range of motion and neck supple.      Left hip: Deformity, tenderness and bony tenderness present. Decreased range of motion.   Lymphadenopathy:      Cervical: No cervical adenopathy.      Upper Body:      Right upper body: No supraclavicular adenopathy.      Left upper body: No supraclavicular adenopathy.   Skin:     General: Skin is warm and dry.      Capillary Refill: Capillary refill takes less than 2 seconds.      Findings: No rash.   Neurological:      Mental Status: He is alert and oriented to person, place, and time.   Psychiatric:         Mood and Affect: Mood is not anxious.         Speech: Speech normal.         Behavior: Behavior normal.         Thought Content: Thought content normal.         Judgment: Judgment normal.       Significant Labs: All pertinent labs within the past 24 hours have been reviewed.  CBC:   Recent Labs   Lab 11/24/22  1145 11/25/22  0626   WBC 7.96 8.34   HGB 13.5* 13.6*   HCT 40.0 40.3    199     CMP:   Recent Labs   Lab 11/24/22  1227 11/25/22  0626    137   K 3.5 3.9    102   CO2 18* 21*   GLU 78 85   BUN 24* 28*   CREATININE 1.4 1.5*   CALCIUM 8.8 8.6*   PROT 7.1  --    ALBUMIN 3.4*  --    BILITOT 0.6  --    ALKPHOS 57   --    AST 18  --    ALT 16  --    ANIONGAP 13 14       Significant Imaging: I have reviewed all pertinent imaging results/findings within the past 24 hours.      Assessment/Plan:      * Closed displaced fracture of left femoral neck  --Hip fracture was demonstrated on X-ray with mild offset. The case was discussed with Dr. Ventura, Orthopedic Surgery, who will plan to operate Friday, 11/25/22.   --He has a recent routine follow-up with his primary cardiologist, Dr. Montana. He has a 6.0% 30-day risk of death or cardiac arrest for this urgent orthopedic procedure, given his CAD history based on the revised cardiac Risk Index. The patient verbalized understanding. NPO past midnight  --analgesia prn  --PT/OT has been ordered postoperatively. We will consult  once the assessment has been completed.         Hypothyroid  --TSH from 8/30/22 reviewed. Continue Synthroid.    Hypertension  --His blood pressure is currently controlled. (antihypertensives recently optimized by adding HCTZ to olmesartan.  --Continue amlodipine 10mg, metoprolol 50mg  --Will replace Olmesartan with Lorstan while hospitalized- held losartan prior to procedure.    Diabetes mellitus  Patient's FSGs are controlled on current medication regimen.  Last A1c reviewed- 6.5 on 8/30/22  Current correctional scale  Medium  Maintain anti-hyperglycemic dose as follows-   Antihyperglycemics (From admission, onward)    Start     Stop Route Frequency Ordered    11/24/22 1423  insulin aspart U-100 pen 1-10 Units         -- SubQ Before meals & nightly PRN 11/24/22 1323        Hold Oral hypoglycemics while patient is in the hospital.    Coronary artery disease  --Last seen by Dr. Montana 10/18/22. He was without cardiac concerns or symptoms.    --Denies angina.   --Resume ASA post procedure  --Continue metoprolol  --Intolerant to STATIN        VTE Risk Mitigation (From admission, onward)         Ordered     Place sequential compression device  Until  discontinued         11/24/22 1327                Discharge Planning   VALDEZ:      Code Status: Full Code   Is the patient medically ready for discharge?:     Reason for patient still in hospital (select all that apply): Patient trending condition                     Sonia Hernandez NP  Department of Hospital Medicine   [facility] [facility] ([facility])

## 2022-11-25 NOTE — ASSESSMENT & PLAN NOTE
--Last seen by Dr. Montana 10/18/22. He was without cardiac concerns or symptoms.    --Denies angina.   --Resume ASA post procedure  --Continue metoprolol  --Intolerant to STATIN

## 2022-11-25 NOTE — ASSESSMENT & PLAN NOTE
--His blood pressure is currently controlled. (antihypertensives recently optimized by adding HCTZ to olmesartan.  --Continue amlodipine 10mg, metoprolol 50mg  --Will replace Olmesartan with Lorstan while hospitalized- held losartan prior to procedure.

## 2022-11-25 NOTE — PROGRESS NOTES
Pharmacist Renal Dose Adjustment Note    Nahum Palacio is a 71 y.o. male being treated with the medication famotidine     Patient Data:    Vital Signs (Most Recent):  Temp: 98.9 °F (37.2 °C) (11/25/22 1556)  Pulse: 70 (11/25/22 1556)  Resp: 17 (11/25/22 1556)  BP: (!) 125/58 (11/25/22 1556)  SpO2: (!) 91 % (11/25/22 1556)   Vital Signs (72h Range):  Temp:  [98.1 °F (36.7 °C)-99 °F (37.2 °C)]   Pulse:  [70-86]   Resp:  [12-26]   BP: (104-149)/(51-79)   SpO2:  [87 %-97 %]      Recent Labs   Lab 11/24/22  1227 11/25/22  0626   CREATININE 1.4 1.5*     Serum creatinine: 1.5 mg/dL (H) 11/25/22 0626  Estimated creatinine clearance: 39.6 mL/min (A)    Medication:famotidine 20mg BID will be changed to famotidine 20mg daily for crcl <50ml/min    Pharmacist's Name: Karolina Rowley  Pharmacist's Extension: [phone]

## 2022-11-26 PROBLEM — Z96.642 S/P TOTAL LEFT HIP ARTHROPLASTY: Status: ACTIVE | Noted: 2022-11-26

## 2022-11-26 LAB
ANION GAP SERPL CALC-SCNC: 10 MMOL/L (ref 8–16)
ANION GAP SERPL CALC-SCNC: 11 MMOL/L (ref 8–16)
BASOPHILS # BLD AUTO: 0.02 K/UL (ref 0–0.2)
BASOPHILS NFR BLD: 0.2 % (ref 0–1.9)
BUN SERPL-MCNC: 29 MG/DL (ref 8–23)
BUN SERPL-MCNC: 36 MG/DL (ref 8–23)
CALCIUM SERPL-MCNC: 7.1 MG/DL (ref 8.7–10.5)
CALCIUM SERPL-MCNC: 8.2 MG/DL (ref 8.7–10.5)
CHLORIDE SERPL-SCNC: 100 MMOL/L (ref 95–110)
CHLORIDE SERPL-SCNC: 107 MMOL/L (ref 95–110)
CO2 SERPL-SCNC: 19 MMOL/L (ref 23–29)
CO2 SERPL-SCNC: 23 MMOL/L (ref 23–29)
CREAT SERPL-MCNC: 1.4 MG/DL (ref 0.5–1.4)
CREAT SERPL-MCNC: 1.9 MG/DL (ref 0.5–1.4)
DIFFERENTIAL METHOD: ABNORMAL
EOSINOPHIL # BLD AUTO: 0 K/UL (ref 0–0.5)
EOSINOPHIL NFR BLD: 0 % (ref 0–8)
ERYTHROCYTE [DISTWIDTH] IN BLOOD BY AUTOMATED COUNT: 13.4 % (ref 11.5–14.5)
EST. GFR  (NO RACE VARIABLE): 37 ML/MIN/1.73 M^2
EST. GFR  (NO RACE VARIABLE): 53 ML/MIN/1.73 M^2
GLUCOSE SERPL-MCNC: 157 MG/DL (ref 70–110)
GLUCOSE SERPL-MCNC: 207 MG/DL (ref 70–110)
HCT VFR BLD AUTO: 37.6 % (ref 40–54)
HGB BLD-MCNC: 12.7 G/DL (ref 14–18)
IMM GRANULOCYTES # BLD AUTO: 0.07 K/UL (ref 0–0.04)
IMM GRANULOCYTES NFR BLD AUTO: 0.8 % (ref 0–0.5)
LYMPHOCYTES # BLD AUTO: 0.6 K/UL (ref 1–4.8)
LYMPHOCYTES NFR BLD: 7.3 % (ref 18–48)
MCH RBC QN AUTO: 29.7 PG (ref 27–31)
MCHC RBC AUTO-ENTMCNC: 33.8 G/DL (ref 32–36)
MCV RBC AUTO: 88 FL (ref 82–98)
MONOCYTES # BLD AUTO: 0.9 K/UL (ref 0.3–1)
MONOCYTES NFR BLD: 10.3 % (ref 4–15)
NEUTROPHILS # BLD AUTO: 6.8 K/UL (ref 1.8–7.7)
NEUTROPHILS NFR BLD: 81.4 % (ref 38–73)
NRBC BLD-RTO: 0 /100 WBC
PLATELET # BLD AUTO: 170 K/UL (ref 150–450)
PMV BLD AUTO: 10.3 FL (ref 9.2–12.9)
POCT GLUCOSE: 154 MG/DL (ref 70–110)
POCT GLUCOSE: 157 MG/DL (ref 70–110)
POCT GLUCOSE: 162 MG/DL (ref 70–110)
POCT GLUCOSE: 199 MG/DL (ref 70–110)
POTASSIUM SERPL-SCNC: 3.2 MMOL/L (ref 3.5–5.1)
POTASSIUM SERPL-SCNC: 4.2 MMOL/L (ref 3.5–5.1)
RBC # BLD AUTO: 4.27 M/UL (ref 4.6–6.2)
SODIUM SERPL-SCNC: 134 MMOL/L (ref 136–145)
SODIUM SERPL-SCNC: 136 MMOL/L (ref 136–145)
WBC # BLD AUTO: 8.34 K/UL (ref 3.9–12.7)

## 2022-11-26 PROCEDURE — 36415 COLL VENOUS BLD VENIPUNCTURE: CPT | Performed by: NURSE PRACTITIONER

## 2022-11-26 PROCEDURE — 63600175 PHARM REV CODE 636 W HCPCS: Performed by: INTERNAL MEDICINE

## 2022-11-26 PROCEDURE — 11000001 HC ACUTE MED/SURG PRIVATE ROOM

## 2022-11-26 PROCEDURE — 25000003 PHARM REV CODE 250: Performed by: ORTHOPAEDIC SURGERY

## 2022-11-26 PROCEDURE — 97166 OT EVAL MOD COMPLEX 45 MIN: CPT

## 2022-11-26 PROCEDURE — 25000003 PHARM REV CODE 250: Performed by: INTERNAL MEDICINE

## 2022-11-26 PROCEDURE — 97162 PT EVAL MOD COMPLEX 30 MIN: CPT

## 2022-11-26 PROCEDURE — 99900035 HC TECH TIME PER 15 MIN (STAT)

## 2022-11-26 PROCEDURE — 97116 GAIT TRAINING THERAPY: CPT

## 2022-11-26 PROCEDURE — 36415 COLL VENOUS BLD VENIPUNCTURE: CPT | Performed by: ORTHOPAEDIC SURGERY

## 2022-11-26 PROCEDURE — 27000221 HC OXYGEN, UP TO 24 HOURS

## 2022-11-26 PROCEDURE — 97530 THERAPEUTIC ACTIVITIES: CPT

## 2022-11-26 PROCEDURE — 80048 BASIC METABOLIC PNL TOTAL CA: CPT | Performed by: NURSE PRACTITIONER

## 2022-11-26 PROCEDURE — 85025 COMPLETE CBC W/AUTO DIFF WBC: CPT | Performed by: ORTHOPAEDIC SURGERY

## 2022-11-26 PROCEDURE — 63600175 PHARM REV CODE 636 W HCPCS: Performed by: ORTHOPAEDIC SURGERY

## 2022-11-26 PROCEDURE — 94761 N-INVAS EAR/PLS OXIMETRY MLT: CPT

## 2022-11-26 PROCEDURE — 80048 BASIC METABOLIC PNL TOTAL CA: CPT | Mod: 91 | Performed by: ORTHOPAEDIC SURGERY

## 2022-11-26 PROCEDURE — 25000003 PHARM REV CODE 250: Performed by: NURSE PRACTITIONER

## 2022-11-26 RX ORDER — ENOXAPARIN SODIUM 100 MG/ML
40 INJECTION SUBCUTANEOUS EVERY 24 HOURS
Status: DISCONTINUED | OUTPATIENT
Start: 2022-11-26 | End: 2022-11-28 | Stop reason: HOSPADM

## 2022-11-26 RX ORDER — LOSARTAN POTASSIUM 50 MG/1
100 TABLET ORAL DAILY
Status: DISCONTINUED | OUTPATIENT
Start: 2022-11-27 | End: 2022-11-27

## 2022-11-26 RX ORDER — SODIUM CHLORIDE 9 MG/ML
INJECTION, SOLUTION INTRAVENOUS CONTINUOUS
Status: DISCONTINUED | OUTPATIENT
Start: 2022-11-26 | End: 2022-11-27

## 2022-11-26 RX ORDER — MORPHINE SULFATE 2 MG/ML
2 INJECTION, SOLUTION INTRAMUSCULAR; INTRAVENOUS EVERY 4 HOURS PRN
Status: DISCONTINUED | OUTPATIENT
Start: 2022-11-26 | End: 2022-11-28 | Stop reason: HOSPADM

## 2022-11-26 RX ADMIN — AMLODIPINE BESYLATE 10 MG: 10 TABLET ORAL at 08:11

## 2022-11-26 RX ADMIN — ENOXAPARIN SODIUM 40 MG: 40 INJECTION SUBCUTANEOUS at 04:11

## 2022-11-26 RX ADMIN — HYDROCODONE BITARTRATE AND ACETAMINOPHEN 1 TABLET: 10; 325 TABLET ORAL at 07:11

## 2022-11-26 RX ADMIN — LEVOTHYROXINE SODIUM 50 MCG: 50 TABLET ORAL at 05:11

## 2022-11-26 RX ADMIN — TAMSULOSIN HYDROCHLORIDE 0.4 MG: 0.4 CAPSULE ORAL at 08:11

## 2022-11-26 RX ADMIN — INSULIN ASPART 2 UNITS: 100 INJECTION, SOLUTION INTRAVENOUS; SUBCUTANEOUS at 04:11

## 2022-11-26 RX ADMIN — SODIUM CHLORIDE: 0.9 INJECTION, SOLUTION INTRAVENOUS at 03:11

## 2022-11-26 RX ADMIN — FAMOTIDINE 20 MG: 20 TABLET ORAL at 08:11

## 2022-11-26 RX ADMIN — MUPIROCIN 1 G: 20 OINTMENT TOPICAL at 08:11

## 2022-11-26 RX ADMIN — HYDROCODONE BITARTRATE AND ACETAMINOPHEN 1 TABLET: 5; 325 TABLET ORAL at 08:11

## 2022-11-26 RX ADMIN — POLYETHYLENE GLYCOL 3350 17 G: 17 POWDER, FOR SOLUTION ORAL at 08:11

## 2022-11-26 RX ADMIN — METOPROLOL SUCCINATE 50 MG: 50 TABLET, EXTENDED RELEASE ORAL at 08:11

## 2022-11-26 RX ADMIN — SODIUM CHLORIDE: 0.9 INJECTION, SOLUTION INTRAVENOUS at 08:11

## 2022-11-26 RX ADMIN — INSULIN ASPART 2 UNITS: 100 INJECTION, SOLUTION INTRAVENOUS; SUBCUTANEOUS at 11:11

## 2022-11-26 RX ADMIN — MORPHINE SULFATE 2 MG: 2 INJECTION, SOLUTION INTRAMUSCULAR; INTRAVENOUS at 09:11

## 2022-11-26 RX ADMIN — HYDROCODONE BITARTRATE AND ACETAMINOPHEN 1 TABLET: 5; 325 TABLET ORAL at 04:11

## 2022-11-26 NOTE — ASSESSMENT & PLAN NOTE
--Hip fracture was demonstrated on X-ray with mild offset. The case was discussed with Dr. Ventura, Orthopedic Surgery, s/p Left JOHAN on 11/25/22   --He has a recent routine follow-up with his primary cardiologist, Dr. Montana. He has a 6.0% 30-day risk of death or cardiac arrest for this urgent orthopedic procedure, given his CAD history based on the revised cardiac Risk Index. The patient verbalized understanding. NPO past midnight  --analgesia prn  --PT/OT has been ordered postoperatively. We will consult  once the assessment has been completed.

## 2022-11-26 NOTE — SUBJECTIVE & OBJECTIVE
Interval History: s/p left JOHAN, pain well controlled. PT/OT to eval today. Hemovac in place, serosanguinous drainage. Patient prefers to d/c  home with .     Review of Systems   Constitutional:  Positive for activity change and fatigue. Negative for appetite change, chills, diaphoresis, fever and unexpected weight change.   HENT:  Negative for congestion, hearing loss, nosebleeds, postnasal drip, rhinorrhea and trouble swallowing.    Eyes:  Negative for discharge and visual disturbance.   Respiratory:  Negative for cough, chest tightness and shortness of breath.    Cardiovascular:  Negative for chest pain, palpitations and leg swelling.   Gastrointestinal:  Negative for abdominal distention, abdominal pain, constipation, diarrhea, nausea and vomiting.   Endocrine: Negative for cold intolerance and heat intolerance.   Genitourinary:  Negative for difficulty urinating, dysuria, frequency and hematuria.   Musculoskeletal:  Positive for arthralgias, back pain, gait problem and joint swelling. Negative for myalgias.   Skin:  Positive for wound.   Neurological:  Negative for dizziness, weakness, light-headedness and headaches.   Hematological:  Negative for adenopathy. Does not bruise/bleed easily.   Psychiatric/Behavioral:  Negative for agitation, behavioral problems and confusion. The patient is nervous/anxious.    Objective:     Vital Signs (Most Recent):  Temp: 97.7 °F (36.5 °C) (11/26/22 0714)  Pulse: 68 (11/26/22 0714)  Resp: 18 (11/26/22 0716)  BP: 128/64 (11/26/22 0714)  SpO2: (!) 94 % (11/26/22 0714)   Vital Signs (24h Range):  Temp:  [97.7 °F (36.5 °C)-99 °F (37.2 °C)] 97.7 °F (36.5 °C)  Pulse:  [64-81] 68  Resp:  [12-26] 18  SpO2:  [87 %-97 %] 94 %  BP: (104-129)/(51-65) 128/64     Weight: 78.5 kg (173 lb 1 oz)  Body mass index is 27.93 kg/m².    Intake/Output Summary (Last 24 hours) at 11/26/2022 1122  Last data filed at 11/26/2022 0800  Gross per 24 hour   Intake 1180 ml   Output 250 ml   Net 930 ml       Physical Exam  Vitals and nursing note reviewed.   Constitutional:       General: He is not in acute distress.     Appearance: He is well-developed. He is ill-appearing. He is not diaphoretic.   HENT:      Head: Normocephalic and atraumatic.      Right Ear: Hearing and external ear normal.      Left Ear: Hearing and external ear normal.      Nose: Nose normal. No mucosal edema or rhinorrhea.      Mouth/Throat:      Pharynx: Uvula midline.   Eyes:      General:         Right eye: No discharge.         Left eye: No discharge.      Conjunctiva/sclera: Conjunctivae normal.      Right eye: No chemosis.     Left eye: No chemosis.     Pupils: Pupils are equal, round, and reactive to light.   Neck:      Thyroid: No thyroid mass or thyromegaly.      Trachea: Trachea normal.   Cardiovascular:      Rate and Rhythm: Normal rate and regular rhythm.      Pulses:           Dorsalis pedis pulses are 2+ on the right side and 2+ on the left side.      Heart sounds: Normal heart sounds. No murmur heard.  Pulmonary:      Effort: Pulmonary effort is normal. No respiratory distress.      Breath sounds: Normal breath sounds. No decreased breath sounds or wheezing.   Abdominal:      General: Bowel sounds are normal. There is no distension.      Palpations: Abdomen is soft.      Tenderness: There is no abdominal tenderness.   Musculoskeletal:      Cervical back: Normal range of motion and neck supple.      Left hip: Tenderness and bony tenderness present. No deformity. Decreased range of motion.   Lymphadenopathy:      Cervical: No cervical adenopathy.      Upper Body:      Right upper body: No supraclavicular adenopathy.      Left upper body: No supraclavicular adenopathy.   Skin:     General: Skin is warm and dry.      Capillary Refill: Capillary refill takes less than 2 seconds.      Findings: No rash.          Neurological:      Mental Status: He is alert and oriented to person, place, and time.   Psychiatric:         Mood and Affect:  Mood is not anxious.         Speech: Speech normal.         Behavior: Behavior normal.         Thought Content: Thought content normal.         Judgment: Judgment normal.       Significant Labs: All pertinent labs within the past 24 hours have been reviewed.  CBC:   Recent Labs   Lab 11/24/22  1145 11/25/22  0626 11/26/22  0604   WBC 7.96 8.34 8.34   HGB 13.5* 13.6* 12.7*   HCT 40.0 40.3 37.6*    199 170     CMP:   Recent Labs   Lab 11/24/22  1227 11/25/22  0626 11/26/22  0604    137 134*   K 3.5 3.9 4.2    102 100   CO2 18* 21* 23   GLU 78 85 207*   BUN 24* 28* 36*   CREATININE 1.4 1.5* 1.9*   CALCIUM 8.8 8.6* 8.2*   PROT 7.1  --   --    ALBUMIN 3.4*  --   --    BILITOT 0.6  --   --    ALKPHOS 57  --   --    AST 18  --   --    ALT 16  --   --    ANIONGAP 13 14 11       Significant Imaging: I have reviewed all pertinent imaging results/findings within the past 24 hours.

## 2022-11-26 NOTE — PROGRESS NOTES
[facility] Medicine  Progress Note    Patient Name: Nahum Palacio  MRN: [id number]  Patient Class: IP- Inpatient   Admission Date: 11/24/2022  Length of Stay: 2 days  Attending Physician: Loren Kulkarni MD  Primary Care Provider: Eric Foster MD        Subjective:     Principal Problem:Closed displaced fracture of left femoral neck        HPI:  Mauro Palacio is a 71-year-old male with a history of HTN, CAD s/p CABG 2006, and DM who presented to ED for further evaluation of left hip pain following a mechanical fall. According to the patient, he lost his balance after hitting a dog kennel, landing on his left side. He denies attributing factors of dizziness, LOC, or light-headedness. Before the incident, he ambulated independently.     In ED, he was x-ray demonstrated a left formal neck fracture. Orthopedic surgery has been consulted. CT-Left hip is pending. Plans for surgical repair in AM. Hospital medicine has been consulted for admission.     The patient is a Full Code. SDM is his wife, Amira.         Overview/Hospital Course:  71 year old male, Hx: HTN, CAD, CABG (2006) and DM,  admitted for Left femoral neck fracture, s/p total hip arthroplasty per orthopedic on 11/25/22, hemovac in place. Patient vitals stable overnight, losartan held prior to surgery, will resume. Pain well controlled with current regimen pre-operatively. PT/OT to evaluate today, patient prefers to discharge home with HH.       Interval History: s/p left JOHAN, pain well controlled. PT/OT to eval today. Hemovac in place, serosanguinous drainage. Patient prefers to d/c  home with HH.     Review of Systems   Constitutional:  Positive for activity change and fatigue. Negative for appetite change, chills, diaphoresis, fever and unexpected weight change.   HENT:  Negative for congestion, hearing loss, nosebleeds, postnasal drip, rhinorrhea and trouble swallowing.    Eyes:  Negative for discharge and visual disturbance.    Respiratory:  Negative for cough, chest tightness and shortness of breath.    Cardiovascular:  Negative for chest pain, palpitations and leg swelling.   Gastrointestinal:  Negative for abdominal distention, abdominal pain, constipation, diarrhea, nausea and vomiting.   Endocrine: Negative for cold intolerance and heat intolerance.   Genitourinary:  Negative for difficulty urinating, dysuria, frequency and hematuria.   Musculoskeletal:  Positive for arthralgias, back pain, gait problem and joint swelling. Negative for myalgias.   Skin:  Positive for wound.   Neurological:  Negative for dizziness, weakness, light-headedness and headaches.   Hematological:  Negative for adenopathy. Does not bruise/bleed easily.   Psychiatric/Behavioral:  Negative for agitation, behavioral problems and confusion. The patient is nervous/anxious.    Objective:     Vital Signs (Most Recent):  Temp: 97.7 °F (36.5 °C) (11/26/22 0714)  Pulse: 68 (11/26/22 0714)  Resp: 18 (11/26/22 0716)  BP: 128/64 (11/26/22 0714)  SpO2: (!) 94 % (11/26/22 0714)   Vital Signs (24h Range):  Temp:  [97.7 °F (36.5 °C)-99 °F (37.2 °C)] 97.7 °F (36.5 °C)  Pulse:  [64-81] 68  Resp:  [12-26] 18  SpO2:  [87 %-97 %] 94 %  BP: (104-129)/(51-65) 128/64     Weight: 78.5 kg (173 lb 1 oz)  Body mass index is 27.93 kg/m².    Intake/Output Summary (Last 24 hours) at 11/26/2022 1122  Last data filed at 11/26/2022 0800  Gross per 24 hour   Intake 1180 ml   Output 250 ml   Net 930 ml      Physical Exam  Vitals and nursing note reviewed.   Constitutional:       General: He is not in acute distress.     Appearance: He is well-developed. He is ill-appearing. He is not diaphoretic.   HENT:      Head: Normocephalic and atraumatic.      Right Ear: Hearing and external ear normal.      Left Ear: Hearing and external ear normal.      Nose: Nose normal. No mucosal edema or rhinorrhea.      Mouth/Throat:      Pharynx: Uvula midline.   Eyes:      General:         Right eye: No  discharge.         Left eye: No discharge.      Conjunctiva/sclera: Conjunctivae normal.      Right eye: No chemosis.     Left eye: No chemosis.     Pupils: Pupils are equal, round, and reactive to light.   Neck:      Thyroid: No thyroid mass or thyromegaly.      Trachea: Trachea normal.   Cardiovascular:      Rate and Rhythm: Normal rate and regular rhythm.      Pulses:           Dorsalis pedis pulses are 2+ on the right side and 2+ on the left side.      Heart sounds: Normal heart sounds. No murmur heard.  Pulmonary:      Effort: Pulmonary effort is normal. No respiratory distress.      Breath sounds: Normal breath sounds. No decreased breath sounds or wheezing.   Abdominal:      General: Bowel sounds are normal. There is no distension.      Palpations: Abdomen is soft.      Tenderness: There is no abdominal tenderness.   Musculoskeletal:      Cervical back: Normal range of motion and neck supple.      Left hip: Tenderness and bony tenderness present. No deformity. Decreased range of motion.   Lymphadenopathy:      Cervical: No cervical adenopathy.      Upper Body:      Right upper body: No supraclavicular adenopathy.      Left upper body: No supraclavicular adenopathy.   Skin:     General: Skin is warm and dry.      Capillary Refill: Capillary refill takes less than 2 seconds.      Findings: No rash.          Neurological:      Mental Status: He is alert and oriented to person, place, and time.   Psychiatric:         Mood and Affect: Mood is not anxious.         Speech: Speech normal.         Behavior: Behavior normal.         Thought Content: Thought content normal.         Judgment: Judgment normal.       Significant Labs: All pertinent labs within the past 24 hours have been reviewed.  CBC:   Recent Labs   Lab 11/24/22  1145 11/25/22  0626 11/26/22  0604   WBC 7.96 8.34 8.34   HGB 13.5* 13.6* 12.7*   HCT 40.0 40.3 37.6*    199 170     CMP:   Recent Labs   Lab 11/24/22  1227 11/25/22  0626 11/26/22  0604     137 134*   K 3.5 3.9 4.2    102 100   CO2 18* 21* 23   GLU 78 85 207*   BUN 24* 28* 36*   CREATININE 1.4 1.5* 1.9*   CALCIUM 8.8 8.6* 8.2*   PROT 7.1  --   --    ALBUMIN 3.4*  --   --    BILITOT 0.6  --   --    ALKPHOS 57  --   --    AST 18  --   --    ALT 16  --   --    ANIONGAP 13 14 11       Significant Imaging: I have reviewed all pertinent imaging results/findings within the past 24 hours.      Assessment/Plan:      * Closed displaced fracture of left femoral neck  --Hip fracture was demonstrated on X-ray with mild offset. The case was discussed with Dr. Ventura, Orthopedic Surgery, s/p Left JOHAN on 11/25/22   --He has a recent routine follow-up with his primary cardiologist, Dr. Montana. He has a 6.0% 30-day risk of death or cardiac arrest for this urgent orthopedic procedure, given his CAD history based on the revised cardiac Risk Index. The patient verbalized understanding. NPO past midnight  --analgesia prn  --PT/OT has been ordered postoperatively. We will consult  once the assessment has been completed.         S/P total left hip arthroplasty  Completed 11/25/22    --PT/OT to evaluate today  --Discharge plan: patient prefers to d/c home with HH  --Lovenox 40mg SQ daily    Hypothyroid  --TSH from 8/30/22 reviewed. Continue Synthroid.    Hypertension  --His blood pressure is currently controlled. (antihypertensives recently optimized by adding HCTZ to olmesartan.  --Continue amlodipine 10mg, metoprolol 50mg  --Will replace Olmesartan with Lorstan while hospitalized    Diabetes mellitus  Patient's FSGs are controlled on current medication regimen.  Last A1c reviewed- 6.5 on 8/30/22  Current correctional scale  Medium  Maintain anti-hyperglycemic dose as follows-   Antihyperglycemics (From admission, onward)    Start     Stop Route Frequency Ordered    11/24/22 1423  insulin aspart U-100 pen 1-10 Units         -- SubQ Before meals & nightly PRN 11/24/22 1323        Hold Oral  hypoglycemics while patient is in the hospital.    Coronary artery disease  --Last seen by Dr. Montana 10/18/22. He was without cardiac concerns or symptoms.    --Denies angina.   --Resume ASA post procedure  --Continue metoprolol  --Intolerant to STATIN              VTE Risk Mitigation (From admission, onward)         Ordered     enoxaparin injection 40 mg  Daily         11/26/22 0740     IP VTE LOW RISK PATIENT  Once         11/25/22 1621     Place sequential compression device  Until discontinued         11/25/22 1621     Reason for No Pharmacological VTE Prophylaxis  Once        Question:  Reasons:  Answer:  Physician Provided (leave comment)    11/25/22 1111     Place sequential compression device  Until discontinued         11/24/22 1327                Discharge Planning   VALDEZ:      Code Status: Full Code   Is the patient medically ready for discharge?:     Reason for patient still in hospital (select all that apply): Patient trending condition                     Sonia Hernandez NP  Department of Hospital Medicine   [facility] - Med Surg

## 2022-11-26 NOTE — PT/OT/SLP EVAL
"Physical Therapy Evaluation    Patient Name:  Nahum Palacio   MRN:  [id number]    Recommendations:     Discharge Recommendations:  rehabilitation facility   Discharge Equipment Recommendations:  (TBD)   Barriers to discharge: None    Assessment:     Nahum Palacio is a 71 y.o. male admitted with a medical diagnosis of Closed displaced fracture of left femoral neck.  He presents with the following impairments/functional limitations:  weakness, impaired endurance, impaired functional mobility, gait instability, impaired balance, decreased safety awareness, pain, decreased lower extremity function which limits safe functional mobility. Pt demonstrated ability to participate in all functional areas with no s/s of distress and no LOB. PT will benefit from continued PT services to further progress toward baseline.    Rehab Prognosis: Good; patient would benefit from acute skilled PT services to address these deficits and reach maximum level of function.    Recent Surgery: Procedure(s) (LRB):  ARTHROPLASTY (Left) 1 Day Post-Op *ANTERIOR HIP PRECAUTIONS*    Plan:     During this hospitalization, patient to be seen 3 x/week to address the identified rehab impairments via neuromuscular re-education, therapeutic exercises, therapeutic activities, gait training and progress toward the following goals:    Plan of Care Expires:  12/10/22    Subjective     Chief Complaint: closed displaced femoral neck fx s/p JOHAN  Patient/Family Comments/goals: "I've been waiting for yall" Pt and family agreeable to PT services.  Pain/Comfort:  Pain Rating 1: 2/10 (L hip)  Pain Addressed 1: Reposition, Distraction    Patients cultural, spiritual, Taoism conflicts given the current situation: no    Living Environment:  Pt lives with wife in [facility] with no steps at entry.  Prior to admission, patients level of function was independent.  Equipment used at home: none.  DME owned (not currently used): none.  Upon discharge, patient will have " assistance from family; wife very supportive.    Objective:     Communicated with nursing prior to session.  Patient found supine with peripheral IV, telemetry, hemovac, wound vac  upon PT entry to room.    General Precautions: Standard, fall   Orthopedic Precautions: ANTERIOR HIP PRECAUTIONS and WBAT  Braces: N/A  Respiratory Status: Nasal cannula, flow 2 L/min    Exams:  RLE ROM: WFL  RLE Strength: WFL  LLE ROM: impaired following sx  LLE Strength: impaired following sx    Functional Mobility:  Bed Mobility:     Scooting: moderate assistance  Supine to Sit: moderate assistance  Transfers:     Sit to Stand:  moderate assistance with rolling walker  Bed to Chair: moderate assistance with  rolling walker  using  Stand Pivot  Gait: ambulated 60ft min A with RW, demonstrates slow pace, flexed posture, wide ADOLFO, step-to gait pattern with decreased stance time on L.      AM-PAC 6 CLICK MOBILITY  Total Score:11       Treatment & Education:  Educated pt on benefits of consistent participation in PT services to meet functional goals. Educated pt on seated therex to promote strength and joint mobility. Exercises included AP, LAQ, marching x 10-15 reps. Educated to perform exercises intermittently throughout day to tolerance. Educated pt on importance of sitting OOB to promote endurance and overall activity tolerance. Pt expressed understanding. Educated pt on call don't fall policy and use of call button to alert nursing staff of needs including toileting and returning to bed.     Educated pt on anterior hip precautions, pt able to recall after few mins.     Patient left up in chair with all lines intact, call button in reach, nursing notified, and family present.    GOALS:   Multidisciplinary Problems       Physical Therapy Goals          Problem: Physical Therapy    Goal Priority Disciplines Outcome Goal Variances Interventions   Physical Therapy Goal     PT, PT/OT      Description: Pt will perform bed mobility  independently in order to participate in EOB activity.  Pt will perform transfers independently in order to participate in OOB activity.   Pt will ambulate 100 ft mod I with LRAD in order to participate in daily tasks.                         History:     Past Medical History:   Diagnosis Date    Coronary artery disease     Diabetes mellitus     Hypertension        History reviewed. No pertinent surgical history.    Time Tracking:     PT Received On: 11/26/22  PT Start Time: 1455     PT Stop Time: 1523  PT Total Time (min): 28 min     Billable Minutes: Evaluation 10 and Gait Training 18      11/26/2022

## 2022-11-26 NOTE — PT/OT/SLP EVAL
Occupational Therapy   Evaluation    Name: Nahum Palacio  MRN: [id number]  Admitting Diagnosis:  Closed displaced fracture of left femoral neck  Recent Surgery: Procedure(s) (LRB):  ARTHROPLASTY (Left) 1 Day Post-Op    Recommendations:     Discharge Recommendations: rehabilitation facility  Discharge Equipment Recommendations:  walker, rolling, bedside commode, bath bench  Barriers to discharge:       Assessment:     Nahum Palacio is a 71 y.o. male with a medical diagnosis of Closed displaced fracture of left femoral neck.  He presents with DEBILITY AND GENERALIZED WEAKNESS. Performance deficits affecting function: weakness, gait instability, impaired endurance, impaired balance, impaired self care skills, impaired functional mobility, decreased upper extremity function, decreased safety awareness.      Rehab Prognosis: Good; patient would benefit from acute skilled OT services to address these deficits and reach maximum level of function.       Plan:     Patient to be seen 2 x/week to address the above listed problems via self-care/home management, therapeutic activities, therapeutic exercises  Plan of Care Expires:    Plan of Care Reviewed with: patient    Subjective     Chief Complaint: DEBILITY AND GENERALIZED WEAKNESS  Patient/Family Comments/goals:     Occupational Profile:  Living Environment: LIVES WITH SPOUSE IN 1 STORY HOUSE WITH NO STEPS TO ENTER  Previous level of function: (I) WITH ALL ADL'S AND FUNCTIONAL MOBILITY. PT REPORTS STILL DRIVES  Roles and Routines: OCCUPATIONAL THERAPY  Equipment Used at Home:  none  Assistance upon Discharge:     Pain/Comfort:  Pain Rating 1: 2/10  Location - Side 1: Left  Location - Orientation 1: generalized  Location 1: hip    Patients cultural, spiritual, Caodaism conflicts given the current situation:      Objective:     Communicated with: NURSE SUAERZ AND [facility] CHART REVIEW prior to session.  Patient found HOB elevated with peripheral IV, oxygen upon OT entry  to room.    General Precautions: Standard, fall   Orthopedic Precautions:N/A   Braces: N/A  Respiratory Status: Room air    Occupational Performance:    Bed Mobility:    Patient completed Rolling/Turning to Left with  moderate assistance  Patient completed Scooting/Bridging with moderate assistance  Patient completed Supine to Sit with moderate assistance    Functional Mobility/Transfers:  Patient completed Sit <> Stand Transfer with moderate assistance  with  rolling walker   Patient completed Bed <> Chair Transfer using Step Transfer technique with moderate assistance with rolling walker  Functional Mobility: PT AMBULATED 60 FEET WITH MIN A  WITH ROLLING WALKER AND VC'S FOR SAFETY AND POSTURE    Activities of Daily Living:  Upper Body Dressing: minimum assistance .  Lower Body Dressing: total assistance .    Cognitive/Visual Perceptual:  Cognitive/Psychosocial Skills:     -       Oriented to: Person, Place, Time, and Situation   -       Follows Commands/attention:Follows two-step commands  -       Communication: clear/fluent  -       Memory: UNABLE TO ACCURATELY ASSESS  -       Safety awareness/insight to disability: intact     Physical Exam:  Upper Extremity Range of Motion:     -       Right Upper Extremity: WFL  -       Left Upper Extremity: WFL  Upper Extremity Strength:    -       Right Upper Extremity: MMT: 3+/5 GROSSLY  -       Left Upper Extremity: MMT: 3+/5 GROSSLY   Strength:    -       Right Upper Extremity: MMT: 3+/5 GROSSLY  -       Left Upper Extremity: MMT: 3+/5 GROSSLY    AMPAC 6 Click ADL:  AMPAC Total Score: 16    Treatment & Education:  Patient educated on role of OT in acute setting and benefits of participation. Educated on techniques to use to increase independence and decrease fall risk with functional transfers. Educated on importance of OOB activity and calling for A to transfer back to bed. Encouraged completion of B UE AROM therex throughout the day to tolerance to increase  functional strength and activity tolerance. Patient stated understanding and in agreement with POC.      Patient left up in chair with all lines intact, call button in reach, bed alarm on, and NURSE notified    GOALS:   Multidisciplinary Problems       Occupational Therapy Goals          Problem: Occupational Therapy    Goal Priority Disciplines Outcome Interventions   Occupational Therapy Goal     OT, PT/OT     Description: O.T GOALS TO BE MET BY 12-10-22  PT WILL REQ SBA WITH LE DRESSING WITH AE  SBA WITH UE DRESSING  PT WILL TOLERATE 1 SET X 15 REPS B UE ROM EXERCISE  MOD SBA WITH BSC TRANSFERS                         History:     Past Medical History:   Diagnosis Date    Coronary artery disease     Diabetes mellitus     Hypertension        History reviewed. No pertinent surgical history.    Time Tracking:     OT Date of Treatment: 11/26/22  OT Start Time: 1456  OT Stop Time: 1520  OT Total Time (min): 24 min    Billable Minutes:Evaluation 14 MINUTES  Therapeutic Activity 10 MINUTES    11/26/2022

## 2022-11-26 NOTE — PLAN OF CARE
Problem: Adult Inpatient Plan of Care  Goal: Plan of Care Review  Outcome: Ongoing, Progressing  Goal: Patient-Specific Goal (Individualized)  Outcome: Ongoing, Progressing  Goal: Absence of Hospital-Acquired Illness or Injury  Outcome: Ongoing, Progressing  Goal: Optimal Comfort and Wellbeing  Outcome: Ongoing, Progressing  Goal: Readiness for Transition of Care  Outcome: Ongoing, Progressing     Problem: Diabetes Comorbidity  Goal: Blood Glucose Level Within Targeted Range  Outcome: Ongoing, Progressing     Problem: Skin Injury Risk Increased  Goal: Skin Health and Integrity  Outcome: Ongoing, Progressing     Problem: Fall Injury Risk  Goal: Absence of Fall and Fall-Related Injury  Outcome: Ongoing, Progressing     Problem: Pain Acute  Goal: Acceptable Pain Control and Functional Ability  Outcome: Ongoing, Progressing

## 2022-11-26 NOTE — PLAN OF CARE
EVAL AND TX COMPLETED: facilitated bed mobility with mod A, transfers with mod A. Ambulated 60 ft with RW and min A. Recommend rehab

## 2022-11-26 NOTE — ASSESSMENT & PLAN NOTE
Completed 11/25/22    --PT/OT to evaluate today  --Discharge plan: patient prefers to d/c home with HH  --Lovenox 40mg SQ daily

## 2022-11-26 NOTE — PLAN OF CARE
Pt remains free from falls/injuries this shift. Safety precautions maintained. Pain managed with pain medication. No s/s of acute distress noted. VSS. IVF infusing. KAREN dressing CDI with accordion drain in place. Continuing with plan of care. Chart check completed.

## 2022-11-26 NOTE — ASSESSMENT & PLAN NOTE
--His blood pressure is currently controlled. (antihypertensives recently optimized by adding HCTZ to olmesartan.  --Continue amlodipine 10mg, metoprolol 50mg  --Will replace Olmesartan with Lorstan while hospitalized

## 2022-11-26 NOTE — PLAN OF CARE
SEE EVAL FOR DETAILS. PT DISPLAYED DEFICITS WITH ADL'S SKILLS, DECREASE B UE STRENGTH AS WELL AS DEFICITS WITH FUNCTIONAL MOBILITY AND TRANSFERS. RECOMMEND: REHAB

## 2022-11-27 LAB
ANION GAP SERPL CALC-SCNC: 8 MMOL/L (ref 8–16)
BASOPHILS # BLD AUTO: 0.03 K/UL (ref 0–0.2)
BASOPHILS NFR BLD: 0.5 % (ref 0–1.9)
BUN SERPL-MCNC: 24 MG/DL (ref 8–23)
CALCIUM SERPL-MCNC: 7.9 MG/DL (ref 8.7–10.5)
CHLORIDE SERPL-SCNC: 104 MMOL/L (ref 95–110)
CO2 SERPL-SCNC: 24 MMOL/L (ref 23–29)
CREAT SERPL-MCNC: 1.3 MG/DL (ref 0.5–1.4)
DIFFERENTIAL METHOD: ABNORMAL
EOSINOPHIL # BLD AUTO: 0.1 K/UL (ref 0–0.5)
EOSINOPHIL NFR BLD: 1.3 % (ref 0–8)
ERYTHROCYTE [DISTWIDTH] IN BLOOD BY AUTOMATED COUNT: 13.6 % (ref 11.5–14.5)
EST. GFR  (NO RACE VARIABLE): 58 ML/MIN/1.73 M^2
GLUCOSE SERPL-MCNC: 133 MG/DL (ref 70–110)
HCT VFR BLD AUTO: 34.7 % (ref 40–54)
HGB BLD-MCNC: 11.6 G/DL (ref 14–18)
IMM GRANULOCYTES # BLD AUTO: 0.06 K/UL (ref 0–0.04)
IMM GRANULOCYTES NFR BLD AUTO: 1 % (ref 0–0.5)
LYMPHOCYTES # BLD AUTO: 1.1 K/UL (ref 1–4.8)
LYMPHOCYTES NFR BLD: 18.5 % (ref 18–48)
MCH RBC QN AUTO: 30 PG (ref 27–31)
MCHC RBC AUTO-ENTMCNC: 33.4 G/DL (ref 32–36)
MCV RBC AUTO: 90 FL (ref 82–98)
MONOCYTES # BLD AUTO: 0.8 K/UL (ref 0.3–1)
MONOCYTES NFR BLD: 13.2 % (ref 4–15)
NEUTROPHILS # BLD AUTO: 4 K/UL (ref 1.8–7.7)
NEUTROPHILS NFR BLD: 65.5 % (ref 38–73)
NRBC BLD-RTO: 0 /100 WBC
PLATELET # BLD AUTO: 146 K/UL (ref 150–450)
PMV BLD AUTO: 10.2 FL (ref 9.2–12.9)
POCT GLUCOSE: 110 MG/DL (ref 70–110)
POCT GLUCOSE: 121 MG/DL (ref 70–110)
POCT GLUCOSE: 129 MG/DL (ref 70–110)
POCT GLUCOSE: 141 MG/DL (ref 70–110)
POTASSIUM SERPL-SCNC: 3.5 MMOL/L (ref 3.5–5.1)
RBC # BLD AUTO: 3.87 M/UL (ref 4.6–6.2)
SODIUM SERPL-SCNC: 136 MMOL/L (ref 136–145)
WBC # BLD AUTO: 6.06 K/UL (ref 3.9–12.7)

## 2022-11-27 PROCEDURE — 27000221 HC OXYGEN, UP TO 24 HOURS

## 2022-11-27 PROCEDURE — 99900035 HC TECH TIME PER 15 MIN (STAT)

## 2022-11-27 PROCEDURE — 85025 COMPLETE CBC W/AUTO DIFF WBC: CPT | Performed by: ORTHOPAEDIC SURGERY

## 2022-11-27 PROCEDURE — 25000003 PHARM REV CODE 250: Performed by: NURSE PRACTITIONER

## 2022-11-27 PROCEDURE — 36415 COLL VENOUS BLD VENIPUNCTURE: CPT | Performed by: ORTHOPAEDIC SURGERY

## 2022-11-27 PROCEDURE — 25000003 PHARM REV CODE 250: Performed by: ORTHOPAEDIC SURGERY

## 2022-11-27 PROCEDURE — 80048 BASIC METABOLIC PNL TOTAL CA: CPT | Performed by: ORTHOPAEDIC SURGERY

## 2022-11-27 PROCEDURE — 94761 N-INVAS EAR/PLS OXIMETRY MLT: CPT

## 2022-11-27 PROCEDURE — 11000001 HC ACUTE MED/SURG PRIVATE ROOM

## 2022-11-27 PROCEDURE — 25000003 PHARM REV CODE 250: Performed by: INTERNAL MEDICINE

## 2022-11-27 PROCEDURE — 63600175 PHARM REV CODE 636 W HCPCS: Performed by: INTERNAL MEDICINE

## 2022-11-27 RX ORDER — POTASSIUM CHLORIDE 20 MEQ/1
40 TABLET, EXTENDED RELEASE ORAL ONCE
Status: COMPLETED | OUTPATIENT
Start: 2022-11-27 | End: 2022-11-27

## 2022-11-27 RX ORDER — BISACODYL 10 MG
10 SUPPOSITORY, RECTAL RECTAL DAILY PRN
Status: DISCONTINUED | OUTPATIENT
Start: 2022-11-27 | End: 2022-11-28 | Stop reason: HOSPADM

## 2022-11-27 RX ORDER — AMOXICILLIN 250 MG
1 CAPSULE ORAL 2 TIMES DAILY
Status: DISCONTINUED | OUTPATIENT
Start: 2022-11-27 | End: 2022-11-28 | Stop reason: HOSPADM

## 2022-11-27 RX ADMIN — METOPROLOL SUCCINATE 50 MG: 50 TABLET, EXTENDED RELEASE ORAL at 09:11

## 2022-11-27 RX ADMIN — ENOXAPARIN SODIUM 40 MG: 40 INJECTION SUBCUTANEOUS at 05:11

## 2022-11-27 RX ADMIN — FAMOTIDINE 20 MG: 20 TABLET ORAL at 09:11

## 2022-11-27 RX ADMIN — TAMSULOSIN HYDROCHLORIDE 0.4 MG: 0.4 CAPSULE ORAL at 09:11

## 2022-11-27 RX ADMIN — LEVOTHYROXINE SODIUM 50 MCG: 50 TABLET ORAL at 05:11

## 2022-11-27 RX ADMIN — HYDROCODONE BITARTRATE AND ACETAMINOPHEN 1 TABLET: 10; 325 TABLET ORAL at 05:11

## 2022-11-27 RX ADMIN — SODIUM CHLORIDE: 0.9 INJECTION, SOLUTION INTRAVENOUS at 05:11

## 2022-11-27 RX ADMIN — POLYETHYLENE GLYCOL 3350 17 G: 17 POWDER, FOR SOLUTION ORAL at 09:11

## 2022-11-27 RX ADMIN — SENNOSIDES AND DOCUSATE SODIUM 1 TABLET: 50; 8.6 TABLET ORAL at 09:11

## 2022-11-27 RX ADMIN — HYDROCODONE BITARTRATE AND ACETAMINOPHEN 1 TABLET: 10; 325 TABLET ORAL at 12:11

## 2022-11-27 RX ADMIN — SENNOSIDES AND DOCUSATE SODIUM 1 TABLET: 50; 8.6 TABLET ORAL at 11:11

## 2022-11-27 RX ADMIN — POTASSIUM CHLORIDE 40 MEQ: 1500 TABLET, EXTENDED RELEASE ORAL at 09:11

## 2022-11-27 RX ADMIN — HYDROCODONE BITARTRATE AND ACETAMINOPHEN 1 TABLET: 5; 325 TABLET ORAL at 11:11

## 2022-11-27 NOTE — SUBJECTIVE & OBJECTIVE
Interval History: participated with PT/OT yesterday, ambulated with walker 60 feet with minimal assist. Plan for discharge home with HH. Adjusted bowel regimen, d/c pending bowel movement.     Review of Systems   Constitutional:  Positive for activity change and fatigue. Negative for appetite change, chills, diaphoresis, fever and unexpected weight change.   HENT:  Negative for congestion, hearing loss, nosebleeds, postnasal drip, rhinorrhea and trouble swallowing.    Eyes:  Negative for discharge and visual disturbance.   Respiratory:  Negative for cough, chest tightness and shortness of breath.    Cardiovascular:  Negative for chest pain, palpitations and leg swelling.   Gastrointestinal:  Positive for constipation. Negative for abdominal distention, abdominal pain, diarrhea, nausea and vomiting.   Endocrine: Negative for cold intolerance and heat intolerance.   Genitourinary:  Negative for difficulty urinating, dysuria, frequency and hematuria.   Musculoskeletal:  Positive for arthralgias, back pain, gait problem and joint swelling. Negative for myalgias.   Skin:  Positive for wound.   Neurological:  Negative for dizziness, weakness, light-headedness and headaches.   Hematological:  Negative for adenopathy. Does not bruise/bleed easily.   Psychiatric/Behavioral:  Negative for agitation, behavioral problems and confusion. The patient is nervous/anxious.    Objective:     Vital Signs (Most Recent):  Temp: 97.7 °F (36.5 °C) (11/27/22 0728)  Pulse: 66 (11/27/22 0918)  Resp: 16 (11/27/22 0918)  BP: (!) 115/59 (11/27/22 0728)  SpO2: 95 % (11/27/22 0918)   Vital Signs (24h Range):  Temp:  [97.6 °F (36.4 °C)-98.7 °F (37.1 °C)] 97.7 °F (36.5 °C)  Pulse:  [61-68] 66  Resp:  [16-20] 16  SpO2:  [94 %-97 %] 95 %  BP: ()/(53-72) 115/59     Weight: 78.6 kg (173 lb 4.5 oz)  Body mass index is 27.97 kg/m².    Intake/Output Summary (Last 24 hours) at 11/27/2022 1046  Last data filed at 11/27/2022 0929  Gross per 24 hour    Intake 2993.69 ml   Output 400 ml   Net 2593.69 ml      Physical Exam  Vitals and nursing note reviewed.   Constitutional:       General: He is not in acute distress.     Appearance: He is well-developed. He is ill-appearing. He is not diaphoretic.   HENT:      Head: Normocephalic and atraumatic.      Right Ear: Hearing and external ear normal.      Left Ear: Hearing and external ear normal.      Nose: Nose normal. No mucosal edema or rhinorrhea.      Mouth/Throat:      Pharynx: Uvula midline.   Eyes:      General:         Right eye: No discharge.         Left eye: No discharge.      Conjunctiva/sclera: Conjunctivae normal.      Right eye: No chemosis.     Left eye: No chemosis.     Pupils: Pupils are equal, round, and reactive to light.   Neck:      Thyroid: No thyroid mass or thyromegaly.      Trachea: Trachea normal.   Cardiovascular:      Rate and Rhythm: Normal rate and regular rhythm.      Pulses:           Dorsalis pedis pulses are 2+ on the right side and 2+ on the left side.      Heart sounds: Normal heart sounds. No murmur heard.  Pulmonary:      Effort: Pulmonary effort is normal. No respiratory distress.      Breath sounds: Normal breath sounds. No decreased breath sounds or wheezing.   Abdominal:      General: Bowel sounds are decreased. There is no distension.      Palpations: Abdomen is soft.      Tenderness: There is no abdominal tenderness.   Musculoskeletal:      Cervical back: Normal range of motion and neck supple.      Left hip: Tenderness and bony tenderness present. No deformity. Decreased range of motion.   Lymphadenopathy:      Cervical: No cervical adenopathy.      Upper Body:      Right upper body: No supraclavicular adenopathy.      Left upper body: No supraclavicular adenopathy.   Skin:     General: Skin is warm and dry.      Capillary Refill: Capillary refill takes less than 2 seconds.      Findings: No rash.          Neurological:      Mental Status: He is alert and oriented to  person, place, and time.   Psychiatric:         Mood and Affect: Mood is not anxious.         Speech: Speech normal.         Behavior: Behavior normal.         Thought Content: Thought content normal.         Judgment: Judgment normal.       Significant Labs: All pertinent labs within the past 24 hours have been reviewed.  CBC:   Recent Labs   Lab 11/26/22  0604 11/27/22  0625   WBC 8.34 6.06   HGB 12.7* 11.6*   HCT 37.6* 34.7*    146*     CMP:   Recent Labs   Lab 11/26/22  0604 11/26/22  1616 11/27/22  0625   * 136 136   K 4.2 3.2* 3.5    107 104   CO2 23 19* 24   * 157* 133*   BUN 36* 29* 24*   CREATININE 1.9* 1.4 1.3   CALCIUM 8.2* 7.1* 7.9*   ANIONGAP 11 10 8       Significant Imaging: I have reviewed all pertinent imaging results/findings within the past 24 hours.

## 2022-11-27 NOTE — PROGRESS NOTES
[facility] Medicine  Progress Note    Patient Name: Nahum Palacio  MRN: [id number]  Patient Class: IP- Inpatient   Admission Date: 11/24/2022  Length of Stay: 3 days  Attending Physician: Loren Kulkarni MD  Primary Care Provider: Eric Foster MD        Subjective:     Principal Problem:Closed displaced fracture of left femoral neck        HPI:  Mauro Palacio is a 71-year-old male with a history of HTN, CAD s/p CABG 2006, and DM who presented to ED for further evaluation of left hip pain following a mechanical fall. According to the patient, he lost his balance after hitting a dog kennel, landing on his left side. He denies attributing factors of dizziness, LOC, or light-headedness. Before the incident, he ambulated independently.     In ED, he was x-ray demonstrated a left formal neck fracture. Orthopedic surgery has been consulted. CT-Left hip is pending. Plans for surgical repair in AM. Hospital medicine has been consulted for admission.     The patient is a Full Code. SDM is his wife, Amira.         Overview/Hospital Course:  71 year old male, Hx: HTN, CAD, CABG (2006) and DM,  admitted for Left femoral neck fracture, s/p total hip arthroplasty per orthopedic on 11/25/22, hemovac removed per orthopedic. Patient vitals stable, losartan continues to be held. Pain well controlled with current regimen pre-operatively. PT/OT to evaluate today, patient prefers to discharge home with HH. Patient endorses constipation, denies flatus, has hypoactive bowel sounds, adjusted bowel regimen, ordered PRN suppository, if has not had a bowel movement by afternoon will have suppository administered. HH orders placed, planning for discharge pending bowel movement.       Interval History: participated with PT/OT yesterday, ambulated with walker 60 feet with minimal assist. Plan for discharge home with HH. Adjusted bowel regimen, d/c pending bowel movement.     Review of Systems   Constitutional:  Positive  for activity change and fatigue. Negative for appetite change, chills, diaphoresis, fever and unexpected weight change.   HENT:  Negative for congestion, hearing loss, nosebleeds, postnasal drip, rhinorrhea and trouble swallowing.    Eyes:  Negative for discharge and visual disturbance.   Respiratory:  Negative for cough, chest tightness and shortness of breath.    Cardiovascular:  Negative for chest pain, palpitations and leg swelling.   Gastrointestinal:  Positive for constipation. Negative for abdominal distention, abdominal pain, diarrhea, nausea and vomiting.   Endocrine: Negative for cold intolerance and heat intolerance.   Genitourinary:  Negative for difficulty urinating, dysuria, frequency and hematuria.   Musculoskeletal:  Positive for arthralgias, back pain, gait problem and joint swelling. Negative for myalgias.   Skin:  Positive for wound.   Neurological:  Negative for dizziness, weakness, light-headedness and headaches.   Hematological:  Negative for adenopathy. Does not bruise/bleed easily.   Psychiatric/Behavioral:  Negative for agitation, behavioral problems and confusion. The patient is nervous/anxious.    Objective:     Vital Signs (Most Recent):  Temp: 97.7 °F (36.5 °C) (11/27/22 0728)  Pulse: 66 (11/27/22 0918)  Resp: 16 (11/27/22 0918)  BP: (!) 115/59 (11/27/22 0728)  SpO2: 95 % (11/27/22 0918)   Vital Signs (24h Range):  Temp:  [97.6 °F (36.4 °C)-98.7 °F (37.1 °C)] 97.7 °F (36.5 °C)  Pulse:  [61-68] 66  Resp:  [16-20] 16  SpO2:  [94 %-97 %] 95 %  BP: ()/(53-72) 115/59     Weight: 78.6 kg (173 lb 4.5 oz)  Body mass index is 27.97 kg/m².    Intake/Output Summary (Last 24 hours) at 11/27/2022 1046  Last data filed at 11/27/2022 0929  Gross per 24 hour   Intake 2993.69 ml   Output 400 ml   Net 2593.69 ml      Physical Exam  Vitals and nursing note reviewed.   Constitutional:       General: He is not in acute distress.     Appearance: He is well-developed. He is ill-appearing. He is not  diaphoretic.   HENT:      Head: Normocephalic and atraumatic.      Right Ear: Hearing and external ear normal.      Left Ear: Hearing and external ear normal.      Nose: Nose normal. No mucosal edema or rhinorrhea.      Mouth/Throat:      Pharynx: Uvula midline.   Eyes:      General:         Right eye: No discharge.         Left eye: No discharge.      Conjunctiva/sclera: Conjunctivae normal.      Right eye: No chemosis.     Left eye: No chemosis.     Pupils: Pupils are equal, round, and reactive to light.   Neck:      Thyroid: No thyroid mass or thyromegaly.      Trachea: Trachea normal.   Cardiovascular:      Rate and Rhythm: Normal rate and regular rhythm.      Pulses:           Dorsalis pedis pulses are 2+ on the right side and 2+ on the left side.      Heart sounds: Normal heart sounds. No murmur heard.  Pulmonary:      Effort: Pulmonary effort is normal. No respiratory distress.      Breath sounds: Normal breath sounds. No decreased breath sounds or wheezing.   Abdominal:      General: Bowel sounds are decreased. There is no distension.      Palpations: Abdomen is soft.      Tenderness: There is no abdominal tenderness.   Musculoskeletal:      Cervical back: Normal range of motion and neck supple.      Left hip: Tenderness and bony tenderness present. No deformity. Decreased range of motion.   Lymphadenopathy:      Cervical: No cervical adenopathy.      Upper Body:      Right upper body: No supraclavicular adenopathy.      Left upper body: No supraclavicular adenopathy.   Skin:     General: Skin is warm and dry.      Capillary Refill: Capillary refill takes less than 2 seconds.      Findings: No rash.          Neurological:      Mental Status: He is alert and oriented to person, place, and time.   Psychiatric:         Mood and Affect: Mood is not anxious.         Speech: Speech normal.         Behavior: Behavior normal.         Thought Content: Thought content normal.         Judgment: Judgment normal.        Significant Labs: All pertinent labs within the past 24 hours have been reviewed.  CBC:   Recent Labs   Lab 11/26/22  0604 11/27/22  0625   WBC 8.34 6.06   HGB 12.7* 11.6*   HCT 37.6* 34.7*    146*     CMP:   Recent Labs   Lab 11/26/22  0604 11/26/22  1616 11/27/22  0625   * 136 136   K 4.2 3.2* 3.5    107 104   CO2 23 19* 24   * 157* 133*   BUN 36* 29* 24*   CREATININE 1.9* 1.4 1.3   CALCIUM 8.2* 7.1* 7.9*   ANIONGAP 11 10 8       Significant Imaging: I have reviewed all pertinent imaging results/findings within the past 24 hours.      Assessment/Plan:      * Closed displaced fracture of left femoral neck  --Hip fracture was demonstrated on X-ray with mild offset. The case was discussed with Dr. Ventura, Orthopedic Surgery, s/p Left JOHAN on 11/25/22   --He has a recent routine follow-up with his primary cardiologist, Dr. Montana. He has a 6.0% 30-day risk of death or cardiac arrest for this urgent orthopedic procedure, given his CAD history based on the revised cardiac Risk Index.   --analgesia prn  --PT/OT has been ordered postoperatively.   --Discharge plan: Home with HH      S/P total left hip arthroplasty  Completed 11/25/22    --PT/OT to evaluate today  --Discharge plan: patient prefers to d/c home with   -- orders placed, discharge pending bowel movement  --Lovenox 40mg SQ daily    Hypothyroid  --TSH from 8/30/22 reviewed. Continue Synthroid.    Hypertension  --His blood pressure is currently controlled. (antihypertensives recently optimized by adding HCTZ to olmesartan.  --Continue amlodipine 10mg, metoprolol 50mg  --Will replace Olmesartan with Lorstan while hospitalized- losartan held, BP lower at this time likely due to pain medication    Diabetes mellitus  Patient's FSGs are controlled on current medication regimen.  Last A1c reviewed- 6.5 on 8/30/22  Current correctional scale  Medium  Maintain anti-hyperglycemic dose as follows-   Antihyperglycemics (From admission, onward)     Start     Stop Route Frequency Ordered    11/24/22 1423  insulin aspart U-100 pen 1-10 Units         -- SubQ Before meals & nightly PRN 11/24/22 1323        Hold Oral hypoglycemics while patient is in the hospital.    Coronary artery disease  --Last seen by Dr. Montana 10/18/22. He was without cardiac concerns or symptoms.    --Denies angina.   --Resume ASA at discharge, Lovenox while inpatient  --Continue metoprolol  --Intolerant to STATIN              VTE Risk Mitigation (From admission, onward)         Ordered     enoxaparin injection 40 mg  Daily         11/26/22 0740     IP VTE LOW RISK PATIENT  Once         11/25/22 1621     Place sequential compression device  Until discontinued         11/25/22 1621     Reason for No Pharmacological VTE Prophylaxis  Once        Question:  Reasons:  Answer:  Physician Provided (leave comment)    11/25/22 1111     Place sequential compression device  Until discontinued         11/24/22 1327                Discharge Planning   VALDEZ:      Code Status: Full Code   Is the patient medically ready for discharge?:     Reason for patient still in hospital (select all that apply): Patient trending condition  Discharge Plan A: Home with family                  Sonia Hernandez NP  Department of Hospital Medicine   [facility] - Med Surg

## 2022-11-27 NOTE — ASSESSMENT & PLAN NOTE
--Hip fracture was demonstrated on X-ray with mild offset. The case was discussed with Dr. Ventura, Orthopedic Surgery, s/p Left JOHAN on 11/25/22   --He has a recent routine follow-up with his primary cardiologist, Dr. Montana. He has a 6.0% 30-day risk of death or cardiac arrest for this urgent orthopedic procedure, given his CAD history based on the revised cardiac Risk Index.   --analgesia prn  --PT/OT has been ordered postoperatively.   --Discharge plan: Home with

## 2022-11-27 NOTE — CONSULTS
SW met with pt and spouse at bedside. SW explained role. Pt consented to sending a home health referral to [facility]. [facility] DIANA spoke with Rasheed and she reports pt is accepted.

## 2022-11-27 NOTE — ASSESSMENT & PLAN NOTE
Completed 11/25/22    --PT/OT to evaluate today  --Discharge plan: patient prefers to d/c home with HH  --HH orders placed, discharge pending bowel movement  --Lovenox 40mg SQ daily

## 2022-11-27 NOTE — ASSESSMENT & PLAN NOTE
--Last seen by Dr. Montana 10/18/22. He was without cardiac concerns or symptoms.    --Denies angina.   --Resume ASA at discharge, Lovenox while inpatient  --Continue metoprolol  --Intolerant to STATIN

## 2022-11-27 NOTE — PLAN OF CARE
Pt remains free from falls/injuries this shift. Safety precautions maintained. Pain managed with pain medication. No s/s of acute distress noted. VSS. KAREN dressing in place to L hip. Continuing with plan of care. Chart check completed.

## 2022-11-27 NOTE — ASSESSMENT & PLAN NOTE
--His blood pressure is currently controlled. (antihypertensives recently optimized by adding HCTZ to olmesartan.  --Continue amlodipine 10mg, metoprolol 50mg  --Will replace Olmesartan with Lorstan while hospitalized- losartan held, BP lower at this time likely due to pain medication

## 2022-11-28 VITALS
TEMPERATURE: 98 F | HEART RATE: 67 BPM | SYSTOLIC BLOOD PRESSURE: 101 MMHG | BODY MASS INDEX: 27.85 KG/M2 | HEIGHT: 66 IN | WEIGHT: 173.31 LBS | DIASTOLIC BLOOD PRESSURE: 57 MMHG | OXYGEN SATURATION: 93 % | RESPIRATION RATE: 17 BRPM

## 2022-11-28 LAB
ANION GAP SERPL CALC-SCNC: 11 MMOL/L (ref 8–16)
BUN SERPL-MCNC: 22 MG/DL (ref 8–23)
CALCIUM SERPL-MCNC: 8 MG/DL (ref 8.7–10.5)
CHLORIDE SERPL-SCNC: 104 MMOL/L (ref 95–110)
CO2 SERPL-SCNC: 21 MMOL/L (ref 23–29)
CREAT SERPL-MCNC: 1.4 MG/DL (ref 0.5–1.4)
EST. GFR  (NO RACE VARIABLE): 53 ML/MIN/1.73 M^2
GLUCOSE SERPL-MCNC: 146 MG/DL (ref 70–110)
POCT GLUCOSE: 152 MG/DL (ref 70–110)
POTASSIUM SERPL-SCNC: 4.1 MMOL/L (ref 3.5–5.1)
SODIUM SERPL-SCNC: 136 MMOL/L (ref 136–145)

## 2022-11-28 PROCEDURE — 97110 THERAPEUTIC EXERCISES: CPT

## 2022-11-28 PROCEDURE — 36415 COLL VENOUS BLD VENIPUNCTURE: CPT | Performed by: INTERNAL MEDICINE

## 2022-11-28 PROCEDURE — 25000003 PHARM REV CODE 250: Performed by: NURSE PRACTITIONER

## 2022-11-28 PROCEDURE — 97116 GAIT TRAINING THERAPY: CPT

## 2022-11-28 PROCEDURE — 25000003 PHARM REV CODE 250: Performed by: ORTHOPAEDIC SURGERY

## 2022-11-28 PROCEDURE — 25000003 PHARM REV CODE 250: Performed by: INTERNAL MEDICINE

## 2022-11-28 PROCEDURE — 80048 BASIC METABOLIC PNL TOTAL CA: CPT | Performed by: INTERNAL MEDICINE

## 2022-11-28 RX ORDER — NAPROXEN SODIUM 220 MG/1
81 TABLET, FILM COATED ORAL 2 TIMES DAILY
Qty: 60 TABLET | Refills: 0 | Status: SHIPPED | OUTPATIENT
Start: 2022-11-28 | End: 2022-12-28

## 2022-11-28 RX ORDER — HYDROCODONE BITARTRATE AND ACETAMINOPHEN 10; 325 MG/1; MG/1
1 TABLET ORAL EVERY 8 HOURS PRN
Qty: 42 TABLET | Refills: 0 | Status: SHIPPED | OUTPATIENT
Start: 2022-11-28 | End: 2022-12-12

## 2022-11-28 RX ADMIN — TAMSULOSIN HYDROCHLORIDE 0.4 MG: 0.4 CAPSULE ORAL at 08:11

## 2022-11-28 RX ADMIN — SENNOSIDES AND DOCUSATE SODIUM 1 TABLET: 50; 8.6 TABLET ORAL at 08:11

## 2022-11-28 RX ADMIN — POLYETHYLENE GLYCOL 3350 17 G: 17 POWDER, FOR SOLUTION ORAL at 08:11

## 2022-11-28 RX ADMIN — HYDROCODONE BITARTRATE AND ACETAMINOPHEN 1 TABLET: 10; 325 TABLET ORAL at 10:11

## 2022-11-28 RX ADMIN — METOPROLOL SUCCINATE 50 MG: 50 TABLET, EXTENDED RELEASE ORAL at 08:11

## 2022-11-28 RX ADMIN — MUPIROCIN 1 G: 20 OINTMENT TOPICAL at 08:11

## 2022-11-28 RX ADMIN — FAMOTIDINE 20 MG: 20 TABLET ORAL at 08:11

## 2022-11-28 RX ADMIN — LEVOTHYROXINE SODIUM 50 MCG: 50 TABLET ORAL at 05:11

## 2022-11-28 NOTE — PLAN OF CARE
[facility] - Med [facility]urg  Discharge Final Note    Primary Care Provider: Eric Foster MD    Expected Discharge Date: 11/28/2022    Final Discharge Note (most recent)       Final Note - 11/28/22 1127          Final Note    Assessment Type Final Discharge Note     Anticipated Discharge Disposition [place] [facility]     Hospital Resources/Appts/Education Provided Provided patient/caregiver with written discharge plan information;Appointments scheduled and added to AVS        Post-Acute Status    Discharge Delays None known at this time                     Important Message from Medicare             Contact Info       Félix Ventura MD   Specialty: Orthopedic Surgery    [address]  [address]  [place] [facility] [address]   Phone: [phone]       Next Steps: Follow up on 12/9/2022    Instructions: Post op FU at 10:30AM, Please arrive 15 minutes early to complete paperwork          Patient to dc home with [facility]. Per Cathryn CHAHAL with [facility] patients spouse declined the RW as patient was able to obtain one from a friend. Post op appt scheduled and added to AVS. No other cm needs.

## 2022-11-28 NOTE — PLAN OF CARE
TX COMPLETED: facilitated bed mobility with min A, transfers with CGA and RW, ambulated 75 ft x 2 with CGA and RW. Recommend HHPT with RW

## 2022-11-28 NOTE — PROGRESS NOTES
'[facility]  Orthopedics  Progress Note    Patient Name: Nahum Palacio  MRN: [id number]  Admission Date: 11/24/2022  Hospital Length of Stay: 4 days  Attending Provider: Chan Jimenes MD  Primary Care Provider: Eric Foster MD  Follow-up For: Procedure(s) (LRB):  ARTHROPLASTY (Left)    Post-Operative Day: 3 Days Post-Op  Subjective:     Principal Problem:Closed displaced fracture of left femoral neck    Principal Orthopedic Problem:  closed displaced left femoral neck fracture    Interval History: Nahum Palacio is a 71-year-old male postop day 3 status post left total hip arthroplasty.  Patient is resting comfortably in bed.  He reports that he continues improve each day.  No new complaints at this time    Review of patient's allergies indicates:   Allergen Reactions    Statins-hmg-coa reductase inhibitors Other (See Comments)     Muscle aches  Joint Stiffness       Current Facility-Administered Medications   Medication    acetaminophen tablet 650 mg    albuterol-ipratropium 2.5 mg-0.5 mg/3 mL nebulizer solution 3 mL    bisacodyL suppository 10 mg    dextrose 10% bolus 125 mL    dextrose 10% bolus 125 mL    dextrose 10% bolus 250 mL    dextrose 10% bolus 250 mL    enoxaparin injection 40 mg    famotidine tablet 20 mg    glucagon (human recombinant) injection 1 mg    glucose chewable tablet 16 g    glucose chewable tablet 24 g    HYDROcodone-acetaminophen  mg per tablet 1 tablet    HYDROcodone-acetaminophen 5-325 mg per tablet 1 tablet    insulin aspart U-100 pen 1-10 Units    levothyroxine tablet 50 mcg    melatonin tablet 6 mg    metoprolol succinate (TOPROL-XL) 24 hr tablet 50 mg    morphine injection 2 mg    mupirocin 2 % ointment 1 g    naloxone 0.4 mg/mL injection 0.02 mg    ondansetron injection 4 mg    polyethylene glycol packet 17 g    senna-docusate 8.6-50 mg per tablet 1 tablet    simethicone chewable tablet 80 mg    sodium chloride 0.9% flush 10 mL    tamsulosin 24 hr capsule 0.4  "mg     Objective:     Vital Signs (Most Recent):  Temp: 98.2 °F (36.8 °C) (11/28/22 0715)  Pulse: 72 (11/28/22 0715)  Resp: 16 (11/28/22 0715)  BP: (!) 147/69 (11/28/22 0715)  SpO2: 96 % (11/28/22 0715)   Vital Signs (24h Range):  Temp:  [96.8 °F (36 °C)-98.4 °F (36.9 °C)] 98.2 °F (36.8 °C)  Pulse:  [60-77] 72  Resp:  [16-20] 16  SpO2:  [86 %-96 %] 96 %  BP: (121-147)/(60-72) 147/69     Weight: 78.6 kg (173 lb 4.5 oz)  Height: 5' 6" (167.6 cm)  Body mass index is 27.97 kg/m².      Intake/Output Summary (Last 24 hours) at 11/28/2022 0818  Last data filed at 11/27/2022 1837  Gross per 24 hour   Intake 2813.69 ml   Output 200 ml   Net 2613.69 ml       Ortho/SPM Exam  Left hip:  Dressing is clean, dry, and intact   Minimal edema   Mild TTP around the incision   No pain with logroll   Hip ROM decreased secondary to soreness  Calf and compartments are soft and compressible  Motor exam normal   Sensation and pulses intact    GEN: Well developed, well nourished male. AAOX3. No acute distress.   Head: Normocephalic, atraumatic.   Eyes: DEEDEE  Neck: Trachea is midline, no adenopathy  Resp: Breathing unlabored.  Neuro: Motor function normal, Cranial nerves intact  Psych: Mood and affect appropriate.      Significant Labs:   Recent Lab Results  (Last 5 results in the past 24 hours)        11/28/22  0537   11/28/22  0528   11/27/22  2010   11/27/22  1713   11/27/22  1102        Anion Gap 11               BUN 22               Calcium 8.0               Chloride 104               CO2 21               Creatinine 1.4               eGFR 53               Glucose 146               POCT Glucose   152   129   141   121       Potassium 4.1               Sodium 136                                    All pertinent labs within the past 24 hours have been reviewed.    Significant Imaging: I have reviewed and interpreted all pertinent imaging results/findings.    Assessment/Plan:     Active Diagnoses:    Diagnosis Date Noted POA    PRINCIPAL " PROBLEM:  Closed displaced fracture of left femoral neck [S72.002A]  Yes    S/P total left hip arthroplasty [Z96.642] 11/26/2022 Not Applicable    Coronary artery disease [I25.10]  Yes    Diabetes mellitus [E11.9]  Yes    Hypertension [I10]  Yes    Hypothyroid [E03.9]  Yes      Problems Resolved During this Admission:     Assessment:  71-year-old male postop day 3 status post left total hip arthroplasty     Plan:  Weightbearing as tolerated left lower extremity   Anterior hip precautions PT/OT for gait training and ADLs   DVT prophylaxis per primary team   Patient is ready for discharge from orthopedic standpoint   Appreciate recommendation from the therapy and medicine teams for discharge placement   Patient will need follow-up with [facility] at 2 weeks postop    Fox Briggs PA-C  Orthopedics  [facility]

## 2022-11-28 NOTE — NURSING
Pt being dc'd home with wife.  Pt evaluated by PT.  Pt remains free of falls/injury. Safety precautions maintained. Regular diet tolerated. VSS. No S/S of distress noted at this time.

## 2022-11-28 NOTE — DISCHARGE SUMMARY
[facility] Medicine  Discharge Summary      Patient Name: Nahum Palacio  MRN: [id number]  SUZANNE: [id number]  Patient Class: IP- Inpatient  Admission Date: 11/24/2022  Hospital Length of Stay: 4 days  Discharge Date and Time:  11/28/2022 10:10 AM  Attending Physician: Chan Jimenes MD   Discharging Provider: Chan Jimenes MD  Primary Care Provider: Eric Foster MD    Primary Care Team: Networked reference to record PCT     HPI:   Mauro Palacio is a 71-year-old male with a history of HTN, CAD s/p CABG 2006, and DM who presented to ED for further evaluation of left hip pain following a mechanical fall. According to the patient, he lost his balance after hitting a dog kennel, landing on his left side. He denies attributing factors of dizziness, LOC, or light-headedness. Before the incident, he ambulated independently.     In ED, he was x-ray demonstrated a left formal neck fracture. Orthopedic surgery has been consulted. CT-Left hip is pending. Plans for surgical repair in AM. Hospital medicine has been consulted for admission.     The patient is a Full Code. SDM is his wife, Amira.         Procedure(s) (LRB):  ARTHROPLASTY (Left)      Hospital Course:   71 year old male, Hx: HTN, CAD, CABG (2006) and DM,  admitted for Left femoral neck fracture, s/p total hip arthroplasty per orthopedic on 11/25/22, hemovac removed per orthopedic. Patient vitals stable, losartan continues to be held. Pain well controlled with current regimen pre-operatively. PT/OT to evaluate today, patient prefers to discharge home with HH. Patient endorses constipation, denies flatus, has hypoactive bowel sounds, adjusted bowel regimen, ordered PRN suppository, if has not had a bowel movement by afternoon will have suppository administered. HH orders placed, planning for discharge pending bowel movement.      BP (!) 147/69 (BP Location: Right arm, Patient Position: Lying)   Pulse 72   Temp 98.2 °F (36.8 °C) (Oral)    "Resp 16   Ht 5' 6" (1.676 m)   Wt 78.6 kg (173 lb 4.5 oz)   SpO2 96%   BMI 27.97 kg/m²   General: Awake, alert and oriented. No acute distress. Well developed, hydrated and nourished. Appears stated age.    Skin: Skin in warm, dry and intact without rashes or lesions. Nailbeds pink with no cyanosis or clubbing.    Head: The head is normocephalic and atraumatic without tenderness, visible or palpable masses, depressions, or scarring.     Eyes:  Conjunctivae are clear without exudates or hemorrhage. Sclera is non-icteric. EOM are intact,     Nose: Nasal mucosa is pink and moist. The nasal septum is midline. Nares are patent bilaterally.    Throat: Oral mucosa is pink and moist with good dentition. Tongue normal in appearance without lesions and with good symmetrical movement. No buccal nodules or lesions are noted. The pharynx is normal in appearance without tonsillar swelling or exudates.    Cardiac: external chest is normal in appearance without lifts, heaves, or thrills. Heart rate and rhythm are normal. No murmurs, gallops, or rubs are auscultated. S1 and S2 are heard and are of normal intensity.    Respiratory: chest wall is symmetric and without deformity. No signs of trauma. Chest wall is non-tender. No signs of respiratory distress. Lung sounds are clear in all lobes bilaterally without rales, ronchi, or wheezes.     Abdominal: soft, symmetric, and non-tender without distention. no visible lesions or scars. No masses, hepatomegaly, or splenomegaly are noted. BS+    Extremities: Upper and lower extremities are atraumatic in appearance without tenderness or deformity. No swelling or erythema. Full range of motion is noted to all joints.     Neurological: The patient is awake, alert and oriented to person, place, and time with normal speech. Motor function is normal with muscle strength 5/5 bilaterally to upper and lower extremities. Sensation is intact bilaterally. Reflexes 2+ bilaterally. Cranial nerves are " "intact. Cerebellar function is intact. Memory is normal and thought process is intact. No gait abnormalities are appreciated.    Psychiatric: Appropriate mood and affect. Good judgement and insight. No visual or auditory hallucinations. No suicidal or homicidal ideation.      Goals of Care Treatment Preferences:  Code Status: Full Code      Consults:   Consults (From admission, onward)        Status Ordering Provider     Inpatient consult to Social Work  Once        Provider:  (Not yet assigned)    Completed BECCA HANNA     Inpatient consult to Orthopedic Surgery  Once        Provider:  Fox Briggs PA-C    Completed JONG NGUYỄN          No new Assessment & Plan notes have been filed under this hospital service since the last note was generated.  Service: Hospital Medicine    Final Active Diagnoses:    Diagnosis Date Noted POA    PRINCIPAL PROBLEM:  Closed displaced fracture of left femoral neck [S72.002A]  Yes    S/P total left hip arthroplasty [Z96.642] 11/26/2022 Not Applicable    Coronary artery disease [I25.10]  Yes    Diabetes mellitus [E11.9]  Yes    Hypertension [I10]  Yes    Hypothyroid [E03.9]  Yes      Problems Resolved During this Admission:       Discharged Condition: good    Disposition: Home or Self Care    Follow Up:    Patient Instructions:      WALKER FOR HOME USE     Order Specific Question Answer Comments   Type of Walker: Adult (5'4"-6'6") Roller   With wheels? Yes    Height: 5' 6" (1.676 m)    Weight: 78.6 kg (173 lb 4.5 oz)    Length of need (1-99 months): 12    Does patient have medical equipment at home? none    Please check all that apply: Patient is unable to safely ambulate without equipment.      Ambulatory referral/consult to Home Health   Standing Status: Future   Referral Priority: Routine Referral Type: Home Health Care   Referral Reason: Specialty Services Required   Requested Specialty: Home Health Services   Number of Visits Requested: 1     Activity as " tolerated       Significant Diagnostic Studies: Labs:   BMP:   Recent Labs   Lab 11/26/22  1616 11/27/22  0625 11/28/22  0537   * 133* 146*    136 136   K 3.2* 3.5 4.1    104 104   CO2 19* 24 21*   BUN 29* 24* 22   CREATININE 1.4 1.3 1.4   CALCIUM 7.1* 7.9* 8.0*   , CMP   Recent Labs   Lab 11/26/22  1616 11/27/22  0625 11/28/22  0537    136 136   K 3.2* 3.5 4.1    104 104   CO2 19* 24 21*   * 133* 146*   BUN 29* 24* 22   CREATININE 1.4 1.3 1.4   CALCIUM 7.1* 7.9* 8.0*   ANIONGAP 10 8 11    and All labs within the past 24 hours have been reviewed  Radiology:   Imaging Results          CT Hip Without Contrast Left (Final result)  Result time 11/24/22 14:15:39    Final result by Lucius Islas MD (11/24/22 14:15:39)                 Impression:      Left femoral neck fracture.    All CT scans at this facility are performed  using dose modulation techniques as appropriate to performed exam including the following:  automated exposure control; adjustment of mA and/or kV according to the patients size (this includes techniques or standardized protocols for targeted exams where dose is matched to indication/reason for exam: i.e. extremities or head);  iterative reconstruction technique.      Electronically signed by: Lucius Islas MD  Date:    11/24/2022  Time:    14:15             Narrative:    EXAMINATION:  CT HIP WITHOUT CONTRAST LEFT    CLINICAL HISTORY:  Fracture, hip;    TECHNIQUE:  Axial CT left hip with multiplanar reformations.    COMPARISON:  Earlier plain radiographs    FINDINGS:  Again noted is obliquely oriented fracture of the subcapital femoral neck which begins in the more proximal femoral neck close to the femoral head superiorly and is obliquely oriented extending towards the inferior medial aspect of the femoral neck.  Slight impaction.  Slight offset a maximum distance at its superior aspect of approximately 5 mm.    No additional fractures.                                X-Ray Hip 2 or 3 views Left (with Pelvis when performed) (Final result)  Result time 11/24/22 12:22:22    Final result by Lucius Islas MD (11/24/22 12:22:22)                 Impression:      Left femoral neck fracture with mild offset.      Electronically signed by: Lucius Islas MD  Date:    11/24/2022  Time:    12:22             Narrative:    EXAMINATION:  XR HIP WITH PELVIS WHEN PERFORMED, 2 OR 3 VIEWS LEFT    CLINICAL HISTORY:  Pain in left hip    TECHNIQUE:  Routine radiographs obtained.    COMPARISON:  None    FINDINGS:  There is a complete fracture through the subcapital left femoral neck that is slightly obliquely oriented extending into the more inferior aspect of the femoral neck along its medial aspect.  There is some cortical offset at its superior aspect of approximately 5 mm.    No additional fractures.    No significant arthritic changes.    Arterial vascular calcification.                               X-Ray Chest AP Portable (Final result)  Result time 11/24/22 12:20:01    Final result by Lucius Islas MD (11/24/22 12:20:01)                 Impression:      No acute findings      Electronically signed by: Lucius Islas MD  Date:    11/24/2022  Time:    12:20             Narrative:    EXAMINATION:  XR CHEST AP PORTABLE    CLINICAL HISTORY:  Unspecified fall, initial encounter    TECHNIQUE:  Single frontal view of the chest was performed.    COMPARISON:  None    FINDINGS:  Postop changes with previous CABG.  Aortic atherosclerosis.  Heart size mildly enlarged.    The lungs are clear.  No pleural effusions.    No acute fractures.  Chronic appearing left 4th posterior rib deformity.  There also is a an old lateral lower left rib cage fracture involving rib 8.    Advanced arthritic changes left glenohumeral joint.                                  Pending Diagnostic Studies:     None         Medications:  Reconciled Home Medications:      Medication List      START taking these medications     HYDROcodone-acetaminophen  mg per tablet  Commonly known as: NORCO  Take 1 tablet by mouth every 8 (eight) hours as needed for Pain.        CHANGE how you take these medications    aspirin 81 MG Chew  Take 1 tablet (81 mg total) by mouth 2 (two) times a day.  What changed: when to take this        CONTINUE taking these medications    amLODIPine 10 MG tablet  Commonly known as: NORVASC  Take 1 tablet by mouth once daily.     cholecalciferol (vitamin D3) 50 mcg (2,000 unit) Tab  Commonly known as: VITAMIN D3  Take 1 tablet by mouth once daily.     ferrous sulfate 325 mg (65 mg iron) Tab tablet  Commonly known as: FEOSOL  Take 325 mg by mouth once daily.     glimepiride 1 MG tablet  Commonly known as: AMARYL  Take 1 mg by mouth 2 (two) times a day.     levothyroxine 50 MCG tablet  Commonly known as: SYNTHROID  Take 50 mcg by mouth before breakfast.     metFORMIN 500 MG tablet  Commonly known as: GLUCOPHAGE  Take 500 mg by mouth 2 (two) times daily with meals.     metoprolol succinate 50 MG 24 hr tablet  Commonly known as: TOPROL-XL  Take 50 mg by mouth once daily.     olmesartan-hydrochlorothiazide 40-12.5 mg Tab  Commonly known as: BENICAR HCT  Take 1 tablet by mouth once daily.     tamsulosin 0.4 mg Cap  Commonly known as: FLOMAX  Take 0.4 mg by mouth once daily.     TOUJEO SOLOSTAR U-300 INSULIN 300 unit/mL (1.5 mL) Inpn pen  Generic drug: insulin glargine (TOUJEO)  Inject 300 Units into the skin once daily.            Indwelling Lines/Drains at time of discharge:   Lines/Drains/Airways     None                 Time spent on the discharge of patient: 33 minutes of time spent on discharge including examining patient, providing discharge instructions, arranging follow-up and documentation.           Chan Jimenes MD  Department of Hospital Medicine  [facility] - Med Surg

## 2022-11-28 NOTE — PT/OT/SLP PROGRESS
"Physical Therapy  Treatment    Nahum Palacio   MRN: [id number]   Admitting Diagnosis: Closed displaced fracture of left femoral neck    PT Received On: 11/26/22  PT Start Time: 1010     PT Stop Time: 1033    PT Total Time (min): 23 min       Billable Minutes:  Gait Training 13 and Therapeutic Exercise 10    Treatment Type: Treatment  PT/PTA: PT     PTA Visit Number: 0       General Precautions: Standard, fall  Orthopedic Precautions: LLE weight bearing as tolerated, LLE anterior precautions   Braces: N/A  Respiratory Status: Room air    Spiritual, Cultural Beliefs, Mandaen Practices, Values that Affect Care: no    Subjective:  Communicated with nursing prior to session.  Pt agreeable to PT services "we have an adjustable bed at home"    Pain/Comfort  Pain Rating 1: 5/10 (3-5/10 pain)  Pain Addressed 1: Reposition, Distraction    Objective:   Patient found with: peripheral IV, telemetry, wound vac    Functional Mobility:  Bed Mobility:        Transfers:       Gait:    75ft x 2 CGA with RW, demonstrates slow pace, guarded posture, narrow ADOLFO, decreased foot clearance bilaterally, antalgic gait pattern, cues needed for RW sequencing and management        Treatment and Education:  Educated pt on hip precautions given surgical approach.  Educated pt on benefits of consistent participation in PT services to meet functional goals. Educated pt on supine/seated therex to promote strength and joint mobility. Exercises included AP, LAQ, heel slides x 10-15 reps. Educated to perform exercises intermittently throughout day to tolerance. Educated pt on importance of sitting OOB to promote endurance and overall activity tolerance as well as importance of intentional mobility/gait around home with family and RW to decrease loss of mobility. Pt expressed under standing.  Educated pt on call don't fall policy and use of call button to alert nursing staff of needs.       AM-PAC 6 CLICK MOBILITY  How much help from another person " does this patient currently need?   1 = Unable, Total/Dependent Assistance  2 = A lot, Maximum/Moderate Assistance  3 = A little, Minimum/Contact Guard/Supervision  4 = None, Modified Starr/Independent    Turning over in bed (including adjusting bedclothes, sheets and blankets)?: 3  Sitting down on and standing up from a chair with arms (e.g., wheelchair, bedside commode, etc.): 3  Moving from lying on back to sitting on the side of the bed?: 3  Moving to and from a bed to a chair (including a wheelchair)?: 3  Need to walk in hospital room?: 3  Climbing 3-5 steps with a railing?: 1  Basic Mobility Total Score: 16    AM-PAC Raw Score CMS G-Code Modifier Level of Impairment Assistance   6 % Total / Unable   7 - 9 CM 80 - 100% Maximal Assist   10 - 14 CL 60 - 80% Moderate Assist   15 - 19 CK 40 - 60% Moderate Assist   20 - 22 CJ 20 - 40% Minimal Assist   23 CI 1-20% SBA / CGA   24 CH 0% Independent/ Mod I     Patient left up in chair with all lines intact, call button in reach, nursing notified, and family present.    Assessment:  Nahum Palacio is a 71 y.o. male with a medical diagnosis of Closed displaced fracture of left femoral neck and presents with deficits in strength and balance.    Rehab identified problem list/impairments: Rehab identified problem list/impairments: weakness, impaired endurance, impaired functional mobility, gait instability, impaired balance, impaired cognition, decreased safety awareness, pain    Rehab potential is good.    Activity tolerance: Good    Discharge recommendations: Discharge Facility/Level of Care Needs: home health PT     Barriers to discharge:      Equipment recommendations: Equipment Needed After Discharge:  (RW)     GOALS:   Multidisciplinary Problems       Physical Therapy Goals          Problem: Physical Therapy    Goal Priority Disciplines Outcome Goal Variances Interventions   Physical Therapy Goal     PT, PT/OT Ongoing, Progressing     Description: Pt  will perform bed mobility independently in order to participate in EOB activity.  Pt will perform transfers independently in order to participate in OOB activity.   Pt will ambulate 100 ft mod I with LRAD in order to participate in daily tasks.                         PLAN:    Patient to be seen 3 x/week  to address the above listed problems via gait training, therapeutic activities, therapeutic exercises, neuromuscular re-education  Plan of Care expires: 12/10/22  Plan of Care reviewed with: patient, spouse, son         11/28/2022

## 2022-11-29 ENCOUNTER — TELEPHONE (OUTPATIENT)
Dept: ORTHOPEDICS | Facility: CLINIC | Age: 72
End: 2022-11-29
Payer: MEDICARE

## 2022-11-29 ENCOUNTER — PATIENT OUTREACH (OUTPATIENT)
Dept: ADMINISTRATIVE | Facility: CLINIC | Age: 72
End: 2022-11-29
Payer: MEDICARE

## 2022-11-29 NOTE — TELEPHONE ENCOUNTER
Spoke with patient's wife and scheduled his postop appointment. Understanding verbalized of appointment date, time and location.    Appointment with Dr. Ventura @ [facility] discussed with patient's wife and cancelled through the appointment desk. Understanding verbalized.

## 2022-11-29 NOTE — PROGRESS NOTES
C3 nurse spoke with Nahum Palacio (Wife) for a TCC post hospital discharge follow up call. The patient reports does not have a scheduled HOSFU appointment. C3 nurse was unable to schedule [facility]FU appointment [facility]. Patient advised to contact their PCP to schedule a HOSPFU within 5-7 days.      Patient's wife declines NP home referral at this time.

## 2022-11-29 NOTE — TELEPHONE ENCOUNTER
----- Message from Fox Briggs PA-C sent at 11/29/2022  7:57 AM CST -----  This patient needs an ortho trauma clinic follow-up next Friday, 12/09/2022

## 2022-11-29 NOTE — ANESTHESIA POSTPROCEDURE EVALUATION
Anesthesia Post Evaluation    Patient: Nahum Palacio    Procedure(s) Performed: Procedure(s) (LRB):  ARTHROPLASTY, HIP, TOTAL, ANTERIOR APPROACH (Left)    Final Anesthesia Type: general      Patient location during evaluation: PACU  Patient participation: Yes- Able to Participate  Level of consciousness: awake and alert and oriented  Post-procedure vital signs: reviewed and stable  Pain management: adequate  Airway patency: patent  JOHNATHAN mitigation strategies: Verification of full reversal of neuromuscular block  PONV status at discharge: No PONV  Anesthetic complications: no      Cardiovascular status: blood pressure returned to baseline and hemodynamically stable  Respiratory status: unassisted  Hydration status: euvolemic  Follow-up not needed.          Vitals Value Taken Time   /57 11/25/22    Temp 36.8 °C (98.3 °F) 11/25/22    Pulse 67 11/25/22    Resp 17 11/25/22    SpO2 94 % 11/25/22          Event Time   Out of Recovery 11/25/2022 15:45:00         Pain/Jeaneth Score: Pain Rating Prior to Med Admin: 7 (11/28/2022 10:49 AM)

## 2022-12-12 ENCOUNTER — EXTERNAL HOME HEALTH (OUTPATIENT)
Dept: HOME HEALTH SERVICES | Facility: HOSPITAL | Age: 72
End: 2022-12-12
Payer: MEDICARE

## 2024-05-01 ENCOUNTER — HOSPITAL ENCOUNTER (INPATIENT)
Facility: HOSPITAL | Age: 74
LOS: 6 days | Discharge: HOME | DRG: 270 | End: 2024-05-08
Attending: EMERGENCY MEDICINE | Admitting: STUDENT IN AN ORGANIZED HEALTH CARE EDUCATION/TRAINING PROGRAM
Payer: MEDICARE

## 2024-05-01 DIAGNOSIS — R07.9 CHEST PAIN: ICD-10-CM

## 2024-05-01 DIAGNOSIS — I50.43 ACUTE ON CHRONIC COMBINED SYSTOLIC AND DIASTOLIC CONGESTIVE HEART FAILURE: ICD-10-CM

## 2024-05-01 DIAGNOSIS — I25.118 CORONARY ARTERY DISEASE OF NATIVE ARTERY OF NATIVE HEART WITH STABLE ANGINA PECTORIS: ICD-10-CM

## 2024-05-01 DIAGNOSIS — I21.4 NSTEMI (NON-ST ELEVATED MYOCARDIAL INFARCTION): Primary | ICD-10-CM

## 2024-05-01 DIAGNOSIS — I50.9 ACUTE CONGESTIVE HEART FAILURE, UNSPECIFIED HEART FAILURE TYPE: ICD-10-CM

## 2024-05-01 DIAGNOSIS — I25.5 ISCHEMIC CARDIOMYOPATHY: ICD-10-CM

## 2024-05-01 DIAGNOSIS — I21.4 NSTEMI (NON-ST ELEVATION MYOCARDIAL INFARCTION): ICD-10-CM

## 2024-05-01 DIAGNOSIS — I21.4 NON-ST ELEVATION MYOCARDIAL INFARCTION (NSTEMI): ICD-10-CM

## 2024-05-01 DIAGNOSIS — N18.9 CHRONIC KIDNEY DISEASE, UNSPECIFIED CKD STAGE: ICD-10-CM

## 2024-05-01 DIAGNOSIS — I25.10 CAD (CORONARY ARTERY DISEASE): ICD-10-CM

## 2024-05-01 DIAGNOSIS — I10 PRIMARY HYPERTENSION: ICD-10-CM

## 2024-05-01 DIAGNOSIS — I50.41 ACUTE COMBINED SYSTOLIC AND DIASTOLIC CONGESTIVE HEART FAILURE: ICD-10-CM

## 2024-05-01 LAB
BASOPHILS # BLD AUTO: 0.09 K/UL (ref 0–0.2)
BASOPHILS NFR BLD: 1 % (ref 0–1.9)
DIFFERENTIAL METHOD BLD: ABNORMAL
EOSINOPHIL # BLD AUTO: 0.2 K/UL (ref 0–0.5)
EOSINOPHIL NFR BLD: 2.1 % (ref 0–8)
ERYTHROCYTE [DISTWIDTH] IN BLOOD BY AUTOMATED COUNT: 15 % (ref 11.5–14.5)
HCT VFR BLD AUTO: 44.6 % (ref 40–54)
HGB BLD-MCNC: 14.3 G/DL (ref 14–18)
IMM GRANULOCYTES # BLD AUTO: 0.08 K/UL (ref 0–0.04)
IMM GRANULOCYTES NFR BLD AUTO: 0.9 % (ref 0–0.5)
LYMPHOCYTES # BLD AUTO: 1.3 K/UL (ref 1–4.8)
LYMPHOCYTES NFR BLD: 14.5 % (ref 18–48)
MCH RBC QN AUTO: 28.7 PG (ref 27–31)
MCHC RBC AUTO-ENTMCNC: 32.1 G/DL (ref 32–36)
MCV RBC AUTO: 89 FL (ref 82–98)
MONOCYTES # BLD AUTO: 0.6 K/UL (ref 0.3–1)
MONOCYTES NFR BLD: 6.8 % (ref 4–15)
NEUTROPHILS # BLD AUTO: 6.7 K/UL (ref 1.8–7.7)
NEUTROPHILS NFR BLD: 74.7 % (ref 38–73)
NRBC BLD-RTO: 0 /100 WBC
PLATELET # BLD AUTO: 275 K/UL (ref 150–450)
PMV BLD AUTO: 9.9 FL (ref 9.2–12.9)
RBC # BLD AUTO: 4.99 M/UL (ref 4.6–6.2)
WBC # BLD AUTO: 8.94 K/UL (ref 3.9–12.7)

## 2024-05-01 PROCEDURE — 96375 TX/PRO/DX INJ NEW DRUG ADDON: CPT

## 2024-05-01 PROCEDURE — 96374 THER/PROPH/DIAG INJ IV PUSH: CPT

## 2024-05-01 PROCEDURE — 63600175 PHARM REV CODE 636 W HCPCS: Performed by: EMERGENCY MEDICINE

## 2024-05-01 PROCEDURE — 83880 ASSAY OF NATRIURETIC PEPTIDE: CPT | Performed by: EMERGENCY MEDICINE

## 2024-05-01 PROCEDURE — 25000003 PHARM REV CODE 250: Performed by: EMERGENCY MEDICINE

## 2024-05-01 PROCEDURE — 80053 COMPREHEN METABOLIC PANEL: CPT | Performed by: EMERGENCY MEDICINE

## 2024-05-01 PROCEDURE — 93010 ELECTROCARDIOGRAM REPORT: CPT | Mod: ,,, | Performed by: INTERNAL MEDICINE

## 2024-05-01 PROCEDURE — 93005 ELECTROCARDIOGRAM TRACING: CPT

## 2024-05-01 PROCEDURE — 85025 COMPLETE CBC W/AUTO DIFF WBC: CPT | Performed by: EMERGENCY MEDICINE

## 2024-05-01 PROCEDURE — 84484 ASSAY OF TROPONIN QUANT: CPT | Performed by: EMERGENCY MEDICINE

## 2024-05-01 RX ORDER — ONDANSETRON HYDROCHLORIDE 2 MG/ML
4 INJECTION, SOLUTION INTRAVENOUS
Status: COMPLETED | OUTPATIENT
Start: 2024-05-01 | End: 2024-05-01

## 2024-05-01 RX ORDER — MORPHINE SULFATE 4 MG/ML
4 INJECTION, SOLUTION INTRAMUSCULAR; INTRAVENOUS
Status: COMPLETED | OUTPATIENT
Start: 2024-05-01 | End: 2024-05-01

## 2024-05-01 RX ORDER — ASPIRIN 325 MG
325 TABLET ORAL
Status: COMPLETED | OUTPATIENT
Start: 2024-05-01 | End: 2024-05-01

## 2024-05-01 RX ADMIN — ONDANSETRON 4 MG: 2 INJECTION INTRAMUSCULAR; INTRAVENOUS at 11:05

## 2024-05-01 RX ADMIN — MORPHINE SULFATE 4 MG: 4 INJECTION INTRAVENOUS at 11:05

## 2024-05-01 RX ADMIN — ASPIRIN 325 MG ORAL TABLET 325 MG: 325 PILL ORAL at 11:05

## 2024-05-01 NOTE — Clinical Note
The PA catheter was inserted into the right atrium. Hemodynamics were performed. inserted over sv5 wire

## 2024-05-01 NOTE — Clinical Note
The catheter was inserted over the wire into the ostium   left main. Hemodynamics were performed.  An angiography was performed Multiple views were taken. Ostial left main is occluded.

## 2024-05-01 NOTE — Clinical Note
The PA catheter was repositioned to the main pulmonary artery. Hemodynamics were performed. CO= 3.11

## 2024-05-01 NOTE — Clinical Note
The catheter was inserted into the SVG to PDA graft. Hemodynamics were performed.  An angiography was performed of the SVG to PDA graft. Multiple views were taken.

## 2024-05-01 NOTE — Clinical Note
The groin, left radial and left brachial was prepped. The site was prepped with ChloraPrep. The site was clipped. The patient was draped.

## 2024-05-01 NOTE — Clinical Note
The catheter was inserted into the LIMA to LAD graft. Hemodynamics were performed.  An angiography was performed of the LIMA to LAD graft. Multiple views were taken.

## 2024-05-01 NOTE — Clinical Note
The catheter insertion attempt was made into the SVG to RAMUS graft. Hemodynamics were performed.  An angiography was performed of the SVG to RAMUS graft. Multiple views were taken.

## 2024-05-01 NOTE — Clinical Note
The catheter insertion attempt was made into the SVG to PDA graft. Hemodynamics were performed.  An angiography was performed of the SVG to PDA graft.

## 2024-05-01 NOTE — Clinical Note
The catheter was inserted into the abdominal aorta. Hemodynamics were performed.  An angiography was performed of the abdominal aorta.

## 2024-05-02 ENCOUNTER — DOCUMENTATION ONLY (OUTPATIENT)
Dept: CARDIOLOGY | Facility: CLINIC | Age: 74
End: 2024-05-02
Payer: MEDICARE

## 2024-05-02 PROBLEM — I21.4 NSTEMI (NON-ST ELEVATED MYOCARDIAL INFARCTION): Status: ACTIVE | Noted: 2024-05-02

## 2024-05-02 PROBLEM — N18.9 CKD (CHRONIC KIDNEY DISEASE): Status: ACTIVE | Noted: 2024-05-02

## 2024-05-02 PROBLEM — I50.9 ACUTE EXACERBATION OF CHF (CONGESTIVE HEART FAILURE): Status: ACTIVE | Noted: 2024-05-02

## 2024-05-02 LAB
ABO + RH BLD: NORMAL
ALBUMIN SERPL BCP-MCNC: 3.6 G/DL (ref 3.5–5.2)
ALP SERPL-CCNC: 79 U/L (ref 55–135)
ALT SERPL W/O P-5'-P-CCNC: 17 U/L (ref 10–44)
ANION GAP SERPL CALC-SCNC: 11 MMOL/L (ref 8–16)
ANION GAP SERPL CALC-SCNC: 15 MMOL/L (ref 8–16)
AORTIC ROOT ANNULUS: 3.11 CM
APTT PPP: 27.4 SEC (ref 21–32)
APTT PPP: 42.1 SEC (ref 21–32)
ASCENDING AORTA: 3.13 CM
AST SERPL-CCNC: 16 U/L (ref 10–40)
AV INDEX (PROSTH): 0.7
AV MEAN GRADIENT: 2 MMHG
AV PEAK GRADIENT: 4 MMHG
AV VALVE AREA BY VELOCITY RATIO: 2.26 CM²
AV VALVE AREA: 2.32 CM²
AV VELOCITY RATIO: 0.69
BASOPHILS # BLD AUTO: 0.08 K/UL (ref 0–0.2)
BASOPHILS NFR BLD: 0.7 % (ref 0–1.9)
BILIRUB SERPL-MCNC: 0.4 MG/DL (ref 0.1–1)
BLD GP AB SCN CELLS X3 SERPL QL: NORMAL
BNP SERPL-MCNC: 978 PG/ML (ref 0–99)
BSA FOR ECHO PROCEDURE: 1.88 M2
BUN SERPL-MCNC: 23 MG/DL (ref 8–23)
BUN SERPL-MCNC: 25 MG/DL (ref 8–23)
CALCIUM SERPL-MCNC: 9.2 MG/DL (ref 8.7–10.5)
CALCIUM SERPL-MCNC: 9.4 MG/DL (ref 8.7–10.5)
CHLORIDE SERPL-SCNC: 107 MMOL/L (ref 95–110)
CHLORIDE SERPL-SCNC: 108 MMOL/L (ref 95–110)
CHOLEST SERPL-MCNC: 265 MG/DL (ref 120–199)
CHOLEST/HDLC SERPL: 6.3 {RATIO} (ref 2–5)
CO2 SERPL-SCNC: 17 MMOL/L (ref 23–29)
CO2 SERPL-SCNC: 22 MMOL/L (ref 23–29)
CREAT SERPL-MCNC: 1.7 MG/DL (ref 0.5–1.4)
CREAT SERPL-MCNC: 1.8 MG/DL (ref 0.5–1.4)
CV ECHO LV RWT: 0.25 CM
DIFFERENTIAL METHOD BLD: ABNORMAL
DOP CALC AO PEAK VEL: 1.05 M/S
DOP CALC AO VTI: 20.3 CM
DOP CALC LVOT AREA: 3.3 CM2
DOP CALC LVOT DIAMETER: 2.05 CM
DOP CALC LVOT PEAK VEL: 0.72 M/S
DOP CALC LVOT STROKE VOLUME: 47.18 CM3
DOP CALC RVOT PEAK VEL: 0.65 M/S
DOP CALC RVOT VTI: 12.2 CM
DOP CALCLVOT PEAK VEL VTI: 14.3 CM
E WAVE DECELERATION TIME: 146.15 MSEC
E/A RATIO: 0.82
ECHO LV POSTERIOR WALL: 0.71 CM (ref 0.6–1.1)
EJECTION FRACTION: 30 %
EOSINOPHIL # BLD AUTO: 0 K/UL (ref 0–0.5)
EOSINOPHIL NFR BLD: 0 % (ref 0–8)
ERYTHROCYTE [DISTWIDTH] IN BLOOD BY AUTOMATED COUNT: 14.9 % (ref 11.5–14.5)
EST. GFR  (NO RACE VARIABLE): 39 ML/MIN/1.73 M^2
EST. GFR  (NO RACE VARIABLE): 42 ML/MIN/1.73 M^2
ESTIMATED AVG GLUCOSE: 137 MG/DL (ref 68–131)
FRACTIONAL SHORTENING: 14 % (ref 28–44)
GLUCOSE SERPL-MCNC: 217 MG/DL (ref 70–110)
GLUCOSE SERPL-MCNC: 285 MG/DL (ref 70–110)
HBA1C MFR BLD: 6.4 % (ref 4–5.6)
HCT VFR BLD AUTO: 46 % (ref 40–54)
HDLC SERPL-MCNC: 42 MG/DL (ref 40–75)
HDLC SERPL: 15.8 % (ref 20–50)
HGB BLD-MCNC: 14.7 G/DL (ref 14–18)
IMM GRANULOCYTES # BLD AUTO: 0.11 K/UL (ref 0–0.04)
IMM GRANULOCYTES NFR BLD AUTO: 0.9 % (ref 0–0.5)
INR PPP: 1 (ref 0.8–1.2)
INTERVENTRICULAR SEPTUM: 0.95 CM (ref 0.6–1.1)
IVC DIAMETER: 1.92 CM
IVRT: 110.37 MSEC
LA MAJOR: 5.34 CM
LA MINOR: 4.94 CM
LA WIDTH: 4.3 CM
LACTATE SERPL-SCNC: 2.1 MMOL/L (ref 0.5–2.2)
LACTATE SERPL-SCNC: 2.2 MMOL/L (ref 0.5–2.2)
LDLC SERPL CALC-MCNC: 197.4 MG/DL (ref 63–159)
LEFT ATRIUM SIZE: 4.33 CM
LEFT ATRIUM VOLUME INDEX: 43.9 ML/M2
LEFT ATRIUM VOLUME: 81.22 CM3
LEFT INTERNAL DIMENSION IN SYSTOLE: 4.87 CM (ref 2.1–4)
LEFT VENTRICLE DIASTOLIC VOLUME INDEX: 84.48 ML/M2
LEFT VENTRICLE DIASTOLIC VOLUME: 156.29 ML
LEFT VENTRICLE MASS INDEX: 95 G/M2
LEFT VENTRICLE SYSTOLIC VOLUME INDEX: 60 ML/M2
LEFT VENTRICLE SYSTOLIC VOLUME: 110.97 ML
LEFT VENTRICULAR INTERNAL DIMENSION IN DIASTOLE: 5.64 CM (ref 3.5–6)
LEFT VENTRICULAR MASS: 175 G
LV SEPTAL E/E' RATIO: 12.5 M/S
LVOT MG: 1.19 MMHG
LVOT MV: 0.51 CM/S
LYMPHOCYTES # BLD AUTO: 0.7 K/UL (ref 1–4.8)
LYMPHOCYTES NFR BLD: 6 % (ref 18–48)
MCH RBC QN AUTO: 28.9 PG (ref 27–31)
MCHC RBC AUTO-ENTMCNC: 32 G/DL (ref 32–36)
MCV RBC AUTO: 91 FL (ref 82–98)
MONOCYTES # BLD AUTO: 0.4 K/UL (ref 0.3–1)
MONOCYTES NFR BLD: 3.5 % (ref 4–15)
MR PISA EROA: 0.16 CM2
MV PEAK A VEL: 0.61 M/S
MV PEAK E VEL: 0.5 M/S
MV STENOSIS PRESSURE HALF TIME: 42.38 MS
MV VALVE AREA P 1/2 METHOD: 5.19 CM2
NEUTROPHILS # BLD AUTO: 10.4 K/UL (ref 1.8–7.7)
NEUTROPHILS NFR BLD: 88.9 % (ref 38–73)
NONHDLC SERPL-MCNC: 223 MG/DL
NRBC BLD-RTO: 0 /100 WBC
OHS CV RV/LV RATIO: 0.61 CM
OHS LV EJECTION FRACTION SIMPSONS BIPLANE MOD: 26 %
PISA MRMAX VEL: 5.48 M/S
PISA RADIUS: 0.65 CM
PISA TR MAX VEL: 2.38 M/S
PISA VN NYQUIST MS: 0.33 M/S
PISA VN NYQUIST: 0.33 M/S
PLATELET # BLD AUTO: 276 K/UL (ref 150–450)
PMV BLD AUTO: 9.7 FL (ref 9.2–12.9)
POC ACTIVATED CLOTTING TIME K: 201 SEC (ref 74–137)
POC ACTIVATED CLOTTING TIME K: 228 SEC (ref 74–137)
POC ACTIVATED CLOTTING TIME K: 260 SEC (ref 74–137)
POCT GLUCOSE: 151 MG/DL (ref 70–110)
POCT GLUCOSE: 174 MG/DL (ref 70–110)
POCT GLUCOSE: 227 MG/DL (ref 70–110)
POCT GLUCOSE: 268 MG/DL (ref 70–110)
POTASSIUM SERPL-SCNC: 4 MMOL/L (ref 3.5–5.1)
POTASSIUM SERPL-SCNC: 4.2 MMOL/L (ref 3.5–5.1)
PROT SERPL-MCNC: 7.4 G/DL (ref 6–8.4)
PROTHROMBIN TIME: 11.4 SEC (ref 9–12.5)
PV MEAN GRADIENT: 1 MMHG
PV PEAK GRADIENT: 2 MMHG
PV PEAK VELOCITY: 0.79 M/S
RA MAJOR: 4.26 CM
RA PRESSURE ESTIMATED: 8 MMHG
RA WIDTH: 3.5 CM
RBC # BLD AUTO: 5.08 M/UL (ref 4.6–6.2)
RIGHT VENTRICULAR END-DIASTOLIC DIMENSION: 3.43 CM
RV TB RVSP: 10 MMHG
SAMPLE: ABNORMAL
SODIUM SERPL-SCNC: 139 MMOL/L (ref 136–145)
SODIUM SERPL-SCNC: 141 MMOL/L (ref 136–145)
SPECIMEN OUTDATE: NORMAL
STJ: 2.91 CM
TDI SEPTAL: 0.04 M/S
TR MAX PG: 23 MMHG
TRICUSPID ANNULAR PLANE SYSTOLIC EXCURSION: 1.34 CM
TRIGL SERPL-MCNC: 128 MG/DL (ref 30–150)
TROPONIN I SERPL DL<=0.01 NG/ML-MCNC: 0.07 NG/ML (ref 0–0.03)
TROPONIN I SERPL DL<=0.01 NG/ML-MCNC: 1.76 NG/ML (ref 0–0.03)
TROPONIN I SERPL DL<=0.01 NG/ML-MCNC: 41.61 NG/ML (ref 0–0.03)
TROPONIN I SERPL DL<=0.01 NG/ML-MCNC: >50 NG/ML (ref 0–0.03)
TROPONIN I SERPL DL<=0.01 NG/ML-MCNC: >50 NG/ML (ref 0–0.03)
TV REST PULMONARY ARTERY PRESSURE: 31 MMHG
WBC # BLD AUTO: 11.71 K/UL (ref 3.9–12.7)
Z-SCORE OF LEFT VENTRICULAR DIMENSION IN END DIASTOLE: 0.93
Z-SCORE OF LEFT VENTRICULAR DIMENSION IN END SYSTOLE: 3.39

## 2024-05-02 PROCEDURE — 25000003 PHARM REV CODE 250: Performed by: INTERNAL MEDICINE

## 2024-05-02 PROCEDURE — 83036 HEMOGLOBIN GLYCOSYLATED A1C: CPT | Performed by: EMERGENCY MEDICINE

## 2024-05-02 PROCEDURE — 36415 COLL VENOUS BLD VENIPUNCTURE: CPT | Performed by: EMERGENCY MEDICINE

## 2024-05-02 PROCEDURE — 93010 ELECTROCARDIOGRAM REPORT: CPT | Mod: 76,,, | Performed by: INTERNAL MEDICINE

## 2024-05-02 PROCEDURE — 84484 ASSAY OF TROPONIN QUANT: CPT | Performed by: EMERGENCY MEDICINE

## 2024-05-02 PROCEDURE — 85610 PROTHROMBIN TIME: CPT | Performed by: EMERGENCY MEDICINE

## 2024-05-02 PROCEDURE — 83605 ASSAY OF LACTIC ACID: CPT | Performed by: INTERNAL MEDICINE

## 2024-05-02 PROCEDURE — 33967 INSERT I-AORT PERCUT DEVICE: CPT | Performed by: INTERNAL MEDICINE

## 2024-05-02 PROCEDURE — 93005 ELECTROCARDIOGRAM TRACING: CPT

## 2024-05-02 PROCEDURE — C1751 CATH, INF, PER/CENT/MIDLINE: HCPCS | Performed by: INTERNAL MEDICINE

## 2024-05-02 PROCEDURE — 25000003 PHARM REV CODE 250: Performed by: EMERGENCY MEDICINE

## 2024-05-02 PROCEDURE — 27000221 HC OXYGEN, UP TO 24 HOURS

## 2024-05-02 PROCEDURE — 93461 R&L HRT ART/VENTRICLE ANGIO: CPT | Mod: 59 | Performed by: INTERNAL MEDICINE

## 2024-05-02 PROCEDURE — 63600175 PHARM REV CODE 636 W HCPCS: Performed by: NURSE PRACTITIONER

## 2024-05-02 PROCEDURE — 80061 LIPID PANEL: CPT | Performed by: EMERGENCY MEDICINE

## 2024-05-02 PROCEDURE — C1894 INTRO/SHEATH, NON-LASER: HCPCS | Performed by: INTERNAL MEDICINE

## 2024-05-02 PROCEDURE — C1887 CATHETER, GUIDING: HCPCS | Performed by: INTERNAL MEDICINE

## 2024-05-02 PROCEDURE — 99900035 HC TECH TIME PER 15 MIN (STAT)

## 2024-05-02 PROCEDURE — C9600 PERC DRUG-EL COR STENT SING: HCPCS | Mod: RC | Performed by: INTERNAL MEDICINE

## 2024-05-02 PROCEDURE — 36415 COLL VENOUS BLD VENIPUNCTURE: CPT | Mod: XB | Performed by: INTERNAL MEDICINE

## 2024-05-02 PROCEDURE — C1769 GUIDE WIRE: HCPCS | Performed by: INTERNAL MEDICINE

## 2024-05-02 PROCEDURE — 63600175 PHARM REV CODE 636 W HCPCS: Performed by: INTERNAL MEDICINE

## 2024-05-02 PROCEDURE — 25000003 PHARM REV CODE 250: Performed by: PHYSICIAN ASSISTANT

## 2024-05-02 PROCEDURE — 85347 COAGULATION TIME ACTIVATED: CPT | Performed by: INTERNAL MEDICINE

## 2024-05-02 PROCEDURE — 33967 INSERT I-AORT PERCUT DEVICE: CPT | Mod: ,,, | Performed by: INTERNAL MEDICINE

## 2024-05-02 PROCEDURE — 84484 ASSAY OF TROPONIN QUANT: CPT | Mod: 91 | Performed by: NURSE PRACTITIONER

## 2024-05-02 PROCEDURE — 5A02210 ASSISTANCE WITH CARDIAC OUTPUT USING BALLOON PUMP, CONTINUOUS: ICD-10-PCS | Performed by: INTERNAL MEDICINE

## 2024-05-02 PROCEDURE — 86850 RBC ANTIBODY SCREEN: CPT | Performed by: NURSE PRACTITIONER

## 2024-05-02 PROCEDURE — 84484 ASSAY OF TROPONIN QUANT: CPT | Mod: 91 | Performed by: INTERNAL MEDICINE

## 2024-05-02 PROCEDURE — 85025 COMPLETE CBC W/AUTO DIFF WBC: CPT | Performed by: EMERGENCY MEDICINE

## 2024-05-02 PROCEDURE — 99291 CRITICAL CARE FIRST HOUR: CPT

## 2024-05-02 PROCEDURE — 36415 COLL VENOUS BLD VENIPUNCTURE: CPT | Performed by: PHYSICIAN ASSISTANT

## 2024-05-02 PROCEDURE — 99223 1ST HOSP IP/OBS HIGH 75: CPT | Mod: 25,,, | Performed by: INTERNAL MEDICINE

## 2024-05-02 PROCEDURE — 99153 MOD SED SAME PHYS/QHP EA: CPT | Performed by: INTERNAL MEDICINE

## 2024-05-02 PROCEDURE — 94761 N-INVAS EAR/PLS OXIMETRY MLT: CPT

## 2024-05-02 PROCEDURE — 20000000 HC ICU ROOM

## 2024-05-02 PROCEDURE — 25500020 PHARM REV CODE 255: Performed by: INTERNAL MEDICINE

## 2024-05-02 PROCEDURE — C1725 CATH, TRANSLUMIN NON-LASER: HCPCS | Performed by: INTERNAL MEDICINE

## 2024-05-02 PROCEDURE — 25000003 PHARM REV CODE 250: Performed by: NURSE PRACTITIONER

## 2024-05-02 PROCEDURE — 85730 THROMBOPLASTIN TIME PARTIAL: CPT | Mod: 91 | Performed by: HOSPITALIST

## 2024-05-02 PROCEDURE — 93461 R&L HRT ART/VENTRICLE ANGIO: CPT | Mod: 26,59,51, | Performed by: INTERNAL MEDICINE

## 2024-05-02 PROCEDURE — 96375 TX/PRO/DX INJ NEW DRUG ADDON: CPT

## 2024-05-02 PROCEDURE — 27201423 OPTIME MED/SURG SUP & DEVICES STERILE SUPPLY: Performed by: INTERNAL MEDICINE

## 2024-05-02 PROCEDURE — 027035Z DILATION OF CORONARY ARTERY, ONE ARTERY WITH TWO DRUG-ELUTING INTRALUMINAL DEVICES, PERCUTANEOUS APPROACH: ICD-10-PCS | Performed by: INTERNAL MEDICINE

## 2024-05-02 PROCEDURE — 63600175 PHARM REV CODE 636 W HCPCS: Performed by: EMERGENCY MEDICINE

## 2024-05-02 PROCEDURE — 80048 BASIC METABOLIC PNL TOTAL CA: CPT | Performed by: PHYSICIAN ASSISTANT

## 2024-05-02 PROCEDURE — 85730 THROMBOPLASTIN TIME PARTIAL: CPT | Performed by: EMERGENCY MEDICINE

## 2024-05-02 PROCEDURE — 93010 ELECTROCARDIOGRAM REPORT: CPT | Mod: ,,, | Performed by: INTERNAL MEDICINE

## 2024-05-02 PROCEDURE — C1874 STENT, COATED/COV W/DEL SYS: HCPCS | Performed by: INTERNAL MEDICINE

## 2024-05-02 PROCEDURE — 99152 MOD SED SAME PHYS/QHP 5/>YRS: CPT | Performed by: INTERNAL MEDICINE

## 2024-05-02 PROCEDURE — 92928 PRQ TCAT PLMT NTRAC ST 1 LES: CPT | Mod: RC,,, | Performed by: INTERNAL MEDICINE

## 2024-05-02 PROCEDURE — C1876 STENT, NON-COA/NON-COV W/DEL: HCPCS | Performed by: INTERNAL MEDICINE

## 2024-05-02 DEVICE — EVEROLIMUS-ELUTING PLATINUM CHROMIUM CORONARY STENT SYSTEM
Type: IMPLANTABLE DEVICE | Site: CORONARY | Status: FUNCTIONAL
Brand: SYNERGY™ XD

## 2024-05-02 DEVICE — EVEROLIMUS-ELUTING PLATINUM CHROMIUM CORONARY STENT SYSTEM
Type: IMPLANTABLE DEVICE | Site: CORONARY | Status: FUNCTIONAL
Brand: SYNERGY MEGATRON™

## 2024-05-02 RX ORDER — LIDOCAINE HYDROCHLORIDE 20 MG/ML
INJECTION, SOLUTION EPIDURAL; INFILTRATION; INTRACAUDAL; PERINEURAL
Status: DISCONTINUED | OUTPATIENT
Start: 2024-05-02 | End: 2024-05-02 | Stop reason: HOSPADM

## 2024-05-02 RX ORDER — UBIDECARENONE 75 MG
500 CAPSULE ORAL DAILY
COMMUNITY

## 2024-05-02 RX ORDER — HEPARIN SODIUM,PORCINE/D5W 25000/250
0-40 INTRAVENOUS SOLUTION INTRAVENOUS CONTINUOUS
Status: DISCONTINUED | OUTPATIENT
Start: 2024-05-02 | End: 2024-05-02 | Stop reason: SDUPTHER

## 2024-05-02 RX ORDER — MUPIROCIN 20 MG/G
OINTMENT TOPICAL 2 TIMES DAILY
Status: COMPLETED | OUTPATIENT
Start: 2024-05-02 | End: 2024-05-07

## 2024-05-02 RX ORDER — AMLODIPINE BESYLATE 5 MG/1
5 TABLET ORAL DAILY
Status: DISCONTINUED | OUTPATIENT
Start: 2024-05-02 | End: 2024-05-04

## 2024-05-02 RX ORDER — LEVOTHYROXINE SODIUM 50 UG/1
50 TABLET ORAL
Status: DISCONTINUED | OUTPATIENT
Start: 2024-05-02 | End: 2024-05-08 | Stop reason: HOSPADM

## 2024-05-02 RX ORDER — FUROSEMIDE 10 MG/ML
20 INJECTION INTRAMUSCULAR; INTRAVENOUS ONCE
Status: COMPLETED | OUTPATIENT
Start: 2024-05-02 | End: 2024-05-02

## 2024-05-02 RX ORDER — VIT C/E/ZN/COPPR/LUTEIN/ZEAXAN 250MG-90MG
1 CAPSULE ORAL EVERY MORNING
COMMUNITY

## 2024-05-02 RX ORDER — NAPROXEN SODIUM 220 MG/1
81 TABLET, FILM COATED ORAL DAILY
Status: DISCONTINUED | OUTPATIENT
Start: 2024-05-03 | End: 2024-05-08 | Stop reason: HOSPADM

## 2024-05-02 RX ORDER — HEPARIN SODIUM,PORCINE/D5W 25000/250
0-40 INTRAVENOUS SOLUTION INTRAVENOUS CONTINUOUS
Status: DISCONTINUED | OUTPATIENT
Start: 2024-05-02 | End: 2024-05-02

## 2024-05-02 RX ORDER — INSULIN ASPART 100 [IU]/ML
0-10 INJECTION, SOLUTION INTRAVENOUS; SUBCUTANEOUS
Status: DISCONTINUED | OUTPATIENT
Start: 2024-05-02 | End: 2024-05-08 | Stop reason: HOSPADM

## 2024-05-02 RX ORDER — CEFAZOLIN SODIUM 1 G/3ML
INJECTION, POWDER, FOR SOLUTION INTRAMUSCULAR; INTRAVENOUS
Status: DISCONTINUED | OUTPATIENT
Start: 2024-05-02 | End: 2024-05-02 | Stop reason: HOSPADM

## 2024-05-02 RX ORDER — NAPROXEN SODIUM 220 MG/1
324 TABLET, FILM COATED ORAL ONCE
Status: DISCONTINUED | OUTPATIENT
Start: 2024-05-02 | End: 2024-05-02

## 2024-05-02 RX ORDER — FUROSEMIDE 10 MG/ML
40 INJECTION INTRAMUSCULAR; INTRAVENOUS ONCE
Status: DISCONTINUED | OUTPATIENT
Start: 2024-05-02 | End: 2024-05-02

## 2024-05-02 RX ORDER — FUROSEMIDE 10 MG/ML
INJECTION INTRAMUSCULAR; INTRAVENOUS
Status: DISCONTINUED | OUTPATIENT
Start: 2024-05-02 | End: 2024-05-02 | Stop reason: HOSPADM

## 2024-05-02 RX ORDER — LANOLIN ALCOHOL/MO/W.PET/CERES
1 CREAM (GRAM) TOPICAL DAILY
Status: DISCONTINUED | OUTPATIENT
Start: 2024-05-02 | End: 2024-05-08 | Stop reason: HOSPADM

## 2024-05-02 RX ORDER — ONDANSETRON 8 MG/1
8 TABLET, ORALLY DISINTEGRATING ORAL EVERY 8 HOURS PRN
Status: DISCONTINUED | OUTPATIENT
Start: 2024-05-02 | End: 2024-05-08 | Stop reason: HOSPADM

## 2024-05-02 RX ORDER — HYDROCODONE BITARTRATE AND ACETAMINOPHEN 5; 325 MG/1; MG/1
1 TABLET ORAL EVERY 6 HOURS PRN
Status: DISCONTINUED | OUTPATIENT
Start: 2024-05-02 | End: 2024-05-08 | Stop reason: HOSPADM

## 2024-05-02 RX ORDER — ASPIRIN 81 MG/1
81 TABLET ORAL DAILY
COMMUNITY

## 2024-05-02 RX ORDER — TAMSULOSIN HYDROCHLORIDE 0.4 MG/1
0.4 CAPSULE ORAL DAILY
Status: DISCONTINUED | OUTPATIENT
Start: 2024-05-02 | End: 2024-05-08 | Stop reason: HOSPADM

## 2024-05-02 RX ORDER — NITROGLYCERIN 0.4 MG/1
0.4 TABLET SUBLINGUAL EVERY 5 MIN PRN
Status: DISCONTINUED | OUTPATIENT
Start: 2024-05-02 | End: 2024-05-08 | Stop reason: HOSPADM

## 2024-05-02 RX ORDER — FUROSEMIDE 10 MG/ML
40 INJECTION INTRAMUSCULAR; INTRAVENOUS
Status: COMPLETED | OUTPATIENT
Start: 2024-05-02 | End: 2024-05-02

## 2024-05-02 RX ORDER — SODIUM CHLORIDE 9 MG/ML
INJECTION, SOLUTION INTRAVENOUS CONTINUOUS
Status: DISCONTINUED | OUTPATIENT
Start: 2024-05-02 | End: 2024-05-02

## 2024-05-02 RX ORDER — MIDAZOLAM HYDROCHLORIDE 1 MG/ML
INJECTION, SOLUTION INTRAMUSCULAR; INTRAVENOUS
Status: DISCONTINUED | OUTPATIENT
Start: 2024-05-02 | End: 2024-05-02 | Stop reason: HOSPADM

## 2024-05-02 RX ORDER — ACETAMINOPHEN 325 MG/1
650 TABLET ORAL EVERY 4 HOURS PRN
Status: DISCONTINUED | OUTPATIENT
Start: 2024-05-02 | End: 2024-05-08 | Stop reason: HOSPADM

## 2024-05-02 RX ORDER — FUROSEMIDE 10 MG/ML
60 INJECTION INTRAMUSCULAR; INTRAVENOUS ONCE
Status: COMPLETED | OUTPATIENT
Start: 2024-05-03 | End: 2024-05-02

## 2024-05-02 RX ORDER — INSULIN ASPART 100 [IU]/ML
0-10 INJECTION, SOLUTION INTRAVENOUS; SUBCUTANEOUS EVERY 6 HOURS PRN
Status: DISCONTINUED | OUTPATIENT
Start: 2024-05-02 | End: 2024-05-02

## 2024-05-02 RX ORDER — ACETAMINOPHEN 325 MG/1
650 TABLET ORAL
Status: COMPLETED | OUTPATIENT
Start: 2024-05-02 | End: 2024-05-02

## 2024-05-02 RX ORDER — METOPROLOL SUCCINATE 50 MG/1
50 TABLET, EXTENDED RELEASE ORAL NIGHTLY
Status: DISCONTINUED | OUTPATIENT
Start: 2024-05-02 | End: 2024-05-04

## 2024-05-02 RX ORDER — DIPHENHYDRAMINE HYDROCHLORIDE 50 MG/ML
INJECTION INTRAMUSCULAR; INTRAVENOUS
Status: DISCONTINUED | OUTPATIENT
Start: 2024-05-02 | End: 2024-05-02 | Stop reason: HOSPADM

## 2024-05-02 RX ORDER — VERAPAMIL HYDROCHLORIDE 2.5 MG/ML
INJECTION, SOLUTION INTRAVENOUS
Status: DISCONTINUED | OUTPATIENT
Start: 2024-05-02 | End: 2024-05-02 | Stop reason: HOSPADM

## 2024-05-02 RX ORDER — NITROGLYCERIN 5 MG/ML
INJECTION, SOLUTION INTRAVENOUS
Status: DISCONTINUED | OUTPATIENT
Start: 2024-05-02 | End: 2024-05-02 | Stop reason: HOSPADM

## 2024-05-02 RX ORDER — FENTANYL CITRATE 50 UG/ML
INJECTION, SOLUTION INTRAMUSCULAR; INTRAVENOUS
Status: DISCONTINUED | OUTPATIENT
Start: 2024-05-02 | End: 2024-05-02 | Stop reason: HOSPADM

## 2024-05-02 RX ORDER — PHENYLEPHRINE HYDROCHLORIDE 10 MG/ML
INJECTION INTRAVENOUS
Status: DISCONTINUED | OUTPATIENT
Start: 2024-05-02 | End: 2024-05-02 | Stop reason: HOSPADM

## 2024-05-02 RX ORDER — SODIUM CHLORIDE 9 MG/ML
INJECTION, SOLUTION INTRAVENOUS CONTINUOUS
Status: ACTIVE | OUTPATIENT
Start: 2024-05-02 | End: 2024-05-03

## 2024-05-02 RX ORDER — TIROFIBAN HYDROCHLORIDE 50 UG/ML
0.07 INJECTION INTRAVENOUS CONTINUOUS
Status: ACTIVE | OUTPATIENT
Start: 2024-05-02 | End: 2024-05-03

## 2024-05-02 RX ORDER — NICARDIPINE HYDROCHLORIDE 0.2 MG/ML
INJECTION INTRAVENOUS
Status: DISCONTINUED | OUTPATIENT
Start: 2024-05-02 | End: 2024-05-02

## 2024-05-02 RX ORDER — GLUCAGON 1 MG
1 KIT INJECTION
Status: DISCONTINUED | OUTPATIENT
Start: 2024-05-02 | End: 2024-05-08 | Stop reason: HOSPADM

## 2024-05-02 RX ORDER — HEPARIN SODIUM 1000 [USP'U]/ML
INJECTION INTRAVENOUS; SUBCUTANEOUS
Status: DISCONTINUED | OUTPATIENT
Start: 2024-05-02 | End: 2024-05-02 | Stop reason: HOSPADM

## 2024-05-02 RX ORDER — TIROFIBAN HYDROCHLORIDE 50 UG/ML
INJECTION INTRAVENOUS
Status: DISCONTINUED | OUTPATIENT
Start: 2024-05-02 | End: 2024-05-02

## 2024-05-02 RX ADMIN — INSULIN ASPART 2 UNITS: 100 INJECTION, SOLUTION INTRAVENOUS; SUBCUTANEOUS at 12:05

## 2024-05-02 RX ADMIN — HEPARIN SODIUM 12 UNITS/KG/HR: 10000 INJECTION, SOLUTION INTRAVENOUS at 03:05

## 2024-05-02 RX ADMIN — LEVOTHYROXINE SODIUM 50 MCG: 50 TABLET ORAL at 06:05

## 2024-05-02 RX ADMIN — METOPROLOL SUCCINATE 50 MG: 50 TABLET, EXTENDED RELEASE ORAL at 08:05

## 2024-05-02 RX ADMIN — AMLODIPINE BESYLATE 5 MG: 5 TABLET ORAL at 08:05

## 2024-05-02 RX ADMIN — FUROSEMIDE 20 MG: 10 INJECTION, SOLUTION INTRAMUSCULAR; INTRAVENOUS at 08:05

## 2024-05-02 RX ADMIN — FUROSEMIDE 40 MG: 10 INJECTION, SOLUTION INTRAMUSCULAR; INTRAVENOUS at 01:05

## 2024-05-02 RX ADMIN — HYDROCODONE BITARTRATE AND ACETAMINOPHEN 1 TABLET: 5; 325 TABLET ORAL at 07:05

## 2024-05-02 RX ADMIN — SODIUM CHLORIDE: 9 INJECTION, SOLUTION INTRAVENOUS at 08:05

## 2024-05-02 RX ADMIN — MUPIROCIN: 20 OINTMENT TOPICAL at 09:05

## 2024-05-02 RX ADMIN — INSULIN ASPART 4 UNITS: 100 INJECTION, SOLUTION INTRAVENOUS; SUBCUTANEOUS at 06:05

## 2024-05-02 RX ADMIN — NITROGLYCERIN 1 INCH: 20 OINTMENT TOPICAL at 03:05

## 2024-05-02 RX ADMIN — INSULIN ASPART 1 UNITS: 100 INJECTION, SOLUTION INTRAVENOUS; SUBCUTANEOUS at 09:05

## 2024-05-02 RX ADMIN — TIROFIBAN 0.07 MCG/KG/MIN: 5 INJECTION, SOLUTION INTRAVENOUS at 06:05

## 2024-05-02 RX ADMIN — FERROUS SULFATE TAB 325 MG (65 MG ELEMENTAL FE) 1 EACH: 325 (65 FE) TAB at 08:05

## 2024-05-02 RX ADMIN — FUROSEMIDE 60 MG: 10 INJECTION, SOLUTION INTRAMUSCULAR; INTRAVENOUS at 11:05

## 2024-05-02 RX ADMIN — SODIUM CHLORIDE: 9 INJECTION, SOLUTION INTRAVENOUS at 06:05

## 2024-05-02 RX ADMIN — ACETAMINOPHEN 650 MG: 325 TABLET ORAL at 01:05

## 2024-05-02 RX ADMIN — TAMSULOSIN HYDROCHLORIDE 0.4 MG: 0.4 CAPSULE ORAL at 08:05

## 2024-05-02 RX ADMIN — INSULIN DETEMIR 11 UNITS: 100 INJECTION, SOLUTION SUBCUTANEOUS at 08:05

## 2024-05-02 NOTE — ASSESSMENT & PLAN NOTE
Patient has chronic hypothyroidism. TFTs reviewed-   Lab Results   Component Value Date    TSH 1.46 03/25/2024   . Will continue chronic levothyroxine and adjust for and clinical changes.

## 2024-05-02 NOTE — ASSESSMENT & PLAN NOTE
-BNP elevated and CXR with congestion  -Gently diurese  -Preliminary review of TTE with depressed EF, official report to follow  -Continue BB  -Will further optimize regimen as tolerated  -R/LHC tentatively planned today

## 2024-05-02 NOTE — HPI
Nahum Palacio is a 73 y.o. male with a PMH  has a past medical history of CAD (coronary artery disease) of artery bypass graft, Diabetes mellitus, HLD (hyperlipidemia), Hypertension, and Statin intolerance.  Presented to the ER for evaluation of left-sided chest pain that gradually worsened at 6:00 p.m. last night.  Patient reports that he has been suffering from left-sided shoulder pain that radiates into his left out of pectoral muscle for the last several months.  Patient states that his pain/discomfort usually resolves with warm compresses in the hot shower.  Patient states he recently had a steroid injection in January from his orthopedic provider.  However, patient states that when over-the-counter medicine and hot shower did not relieve his symptoms he presented to the ER for further evaluation.  Patient reports he has a history of coronary bypass surgery roughly 17 years ago and states that he presented with atypical symptoms at that time.  Patient denies any substernal chest pain, palpitations, shortness of breath, but does report chronic bilateral lower extremity edema.  Denies any history of CHF.  Patient is followed by Dr. Frederick mancuso for Cardiology.  Recently evaluated by him on 04/10/2024 and had lisinopril changed to losartan due to side effect of cough.  Denies any other symptoms at this time.    ER workup revealed BUN/creatinine of 23/1.8 (baseline creatinine of 1.6) CBG of 285 mg/dL, BNP of 978, initial troponin 0.072 with delta troponin of 1.759.  EKG revealed normal sinus rhythm with a ventricular rate of 83 beats per minute with a QT/QTC of 414/46.  Chest x-ray wet read shows pulmonary congestion.  Hospital Medicine consulted to admit patient for NSTEMI.  Patient initiated on heparin drip.  Patient and wife at bedside are in agreement with treatment plan.  Patient admitted under inpatient status.    PCP: Eric Foster

## 2024-05-02 NOTE — CONSULTS
[facility] - Intensive Care ([facility])  Critical Care Medicine  Consult Note    Patient Name: Nahum Palacio  MRN: [id number]  Admission Date: 5/1/2024  Hospital Length of Stay: 0 days  Code Status: Full Code  Attending Physician: Eric Cameron MD   Primary Care Provider: Eric Foster MD   Principal Problem: NSTEMI (non-ST elevated myocardial infarction)    Inpatient consult to Critical Care Medicine  Consult performed by: Eric Cameron MD  Consult ordered by: Sara Kumar MD        Subjective:     HPI:  Mr Palacio is a 72 y/o WM with CAD s/p CABG, DM, HTN, and HLD with statin intolerance who presented last night with chest and left arm/jaw pain.  Had been having pain on and off since November.  He was initially admitted to the Hospitalist service for work-up.  Troponin went to >50 on trending and LVEF was 30-35% on Echo, so he went to the cath lab this afternoon with Dr Kumar.  LHC showed mostly occluded native vessels with a patent lima to LAD and  SVG to PDA that was treated with stenting.  High filling pressures and moderate pulmonary hypertension, so IABP placed and he was given 40mg IV Lasix in the cath lab, as well.  He comes to the ICU post-procedure with the IABP, hence Critical Care consultation.    I examined him following arrival to the ICU.  He is awake and conversant.  Denies dyspnea, chest pain.  No numbness/tingling.  Distal circulation intact.  Otherwise without complaint.    Hospital/ICU Course:  No notes on file    Past Medical History:   Diagnosis Date    CAD (coronary artery disease) of artery bypass graft     Diabetes mellitus     HLD (hyperlipidemia)     Hypertension     Statin intolerance        Past Surgical History:   Procedure Laterality Date    ARTHROPLASTY OF HIP BY ANTERIOR APPROACH Left 11/25/2022    Procedure: ARTHROPLASTY, HIP, TOTAL, ANTERIOR APPROACH;  Surgeon: Félix Ventura MD;  Location: [facility] OR;  Service: Orthopedics;  Laterality: Left;    CORONARY ARTERY  BYPASS GRAFT (CABG)         Review of patient's allergies indicates:   Allergen Reactions    Statins-hmg-coa reductase inhibitors Other (See Comments)     Muscle aches  Joint Stiffness       Family History    None       Tobacco Use    Smoking status: Former     Types: Cigarettes     Passive exposure: Past    Smokeless tobacco: Never   Substance and Sexual Activity    Alcohol use: Never    Drug use: Never    Sexual activity: Not on file         Review of Systems   All other systems reviewed and are negative.    Objective:     Vital Signs (Most Recent):  Temp: 97.8 °F (36.6 °C) (05/02/24 1227)  Pulse: 92 (05/02/24 1423)  Resp: 18 (05/02/24 1227)  BP: 136/78 (05/02/24 1227)  SpO2: 97 % (05/02/24 1227) Vital Signs (24h Range):  Temp:  [97.7 °F (36.5 °C)-98.1 °F (36.7 °C)] 97.8 °F (36.6 °C)  Pulse:  [75-94] 92  Resp:  [18-21] 18  SpO2:  [90 %-97 %] 97 %  BP: (121-157)/(72-98) 136/78     Weight: 77.1 kg (170 lb)  Body mass index is 28.29 kg/m².      Intake/Output Summary (Last 24 hours) at 5/2/2024 1800  Last data filed at 5/2/2024 1557  Gross per 24 hour   Intake 625.08 ml   Output 1360 ml   Net -734.92 ml        Physical Exam  Vitals and nursing note reviewed.   Constitutional:       General: He is not in acute distress.  HENT:      Head: Normocephalic and atraumatic.   Eyes:      General: No scleral icterus.     Extraocular Movements: Extraocular movements intact.      Conjunctiva/sclera: Conjunctivae normal.      Pupils: Pupils are equal, round, and reactive to light.   Cardiovascular:      Rate and Rhythm: Normal rate and regular rhythm.      Pulses: Normal pulses.      Heart sounds: Murmur heard.      Comments: IABP sounds  Pulmonary:      Effort: Pulmonary effort is normal. No respiratory distress.      Breath sounds: Rales (bibasilar) present. No wheezing or rhonchi.   Abdominal:      General: Abdomen is flat. There is no distension.      Palpations: Abdomen is soft.      Tenderness: There is no abdominal  "tenderness.   Musculoskeletal:      Cervical back: Normal range of motion and neck supple. No rigidity or tenderness.      Right lower leg: Edema present.      Left lower leg: Edema present.      Comments: IABP in place right groin   Skin:     General: Skin is warm and dry.      Coloration: Skin is not jaundiced or pale.   Neurological:      General: No focal deficit present.      Mental Status: He is alert and oriented to person, place, and time. Mental status is at baseline.          Vents:       Lines/Drains/Airways       Line  Duration                  IABP 05/02/24 1700 7.5 Fr. 40 mL <1 day              Peripheral Intravenous Line  Duration                  Peripheral IV - Single Lumen 05/01/24 2329 20 G Right Antecubital <1 day         Peripheral IV - Single Lumen 05/02/24 0315 20 G Left Antecubital <1 day                    Significant Labs:    CBC/Anemia Profile:  Recent Labs   Lab 05/01/24 2318 05/02/24  0322   WBC 8.94 11.71   HGB 14.3 14.7   HCT 44.6 46.0    276   MCV 89 91   RDW 15.0* 14.9*        Chemistries:  Recent Labs   Lab 05/01/24 2318 05/02/24  0949    139   K 4.0 4.2    107   CO2 22* 17*   BUN 23 25*   CREATININE 1.8* 1.7*   CALCIUM 9.4 9.2   ALBUMIN 3.6  --    PROT 7.4  --    BILITOT 0.4  --    ALKPHOS 79  --    ALT 17  --    AST 16  --        All pertinent labs within the past 24 hours have been reviewed.    Significant Imaging:   I have reviewed all pertinent imaging results/findings within the past 24 hours.    ABG  No results for input(s): "PH", "PO2", "PCO2", "HCO3", "BE" in the last 168 hours.  Assessment/Plan:     Cardiac/Vascular  * NSTEMI (non-ST elevated myocardial infarction)  - Troponin peaked >50  - s/p LHC with stents to vein graft and IABP placement 5/2  - Cardiology following  - Cardiac monitoring    Acute exacerbation of CHF (congestive heart failure)  Patient is identified as having Systolic (HFrEF) heart failure that is Acute. CHF is currently " uncontrolled due to Rales/crackles on pulmonary exam. Latest ECHO performed and demonstrates- Results for orders placed during the hospital encounter of 05/01/24    Echo    Interpretation Summary    Left Ventricle: The left ventricle is normal in size. Normal wall thickness. Regional wall motion abnormalities present. See diagram for wall motion findings. Septal motion is consistent with bundle branch block. There is moderately reduced systolic function with a visually estimated ejection fraction of 30 - 40%. Ejection fraction by visual approximation is 30%. Biplane (2D) method of discs ejection fraction is 26%. There is diastolic dysfunction but grade cannot be determined.    Right Ventricle: Normal right ventricular cavity size. Wall thickness is normal. Systolic function is normal.    Left Atrium: Left atrium is moderately dilated.    Mitral Valve: There is moderate regurgitation.    Pulmonary Artery: The estimated pulmonary artery systolic pressure is 31 mmHg.    IVC/SVC: Intermediate venous pressure at 8 mmHg.  . Continue Beta Blocker and monitor clinical status closely. Monitor on telemetry. Patient is off CHF pathway.  Monitor strict Is&Os and daily weights.  Place on fluid restriction of 1.5 L. Cardiology has been consulted. Continue to stress to patient importance of self efficacy and  on diet for CHF. Last BNP reviewed- and noted below   Recent Labs   Lab 05/01/24  2318   *     - given 40mg IV Lasix in the cath lab  --- may redose overnight  - IABP in place 5/2  - Cardiology following    Hypertension  Chronic, controlled. Latest blood pressure and vitals reviewed-     Temp:  [97.7 °F (36.5 °C)-98.1 °F (36.7 °C)]   Pulse:  [75-94]   Resp:  [18-21]   BP: (121-157)/(72-98)   SpO2:  [90 %-97 %] .   Home meds for hypertension were reviewed and noted below.   Hypertension Medications               amLODIPine (NORVASC) 10 MG tablet Take 5 mg by mouth once daily.    metoprolol succinate (TOPROL-XL)  50 MG 24 hr tablet Take 50 mg by mouth every evening.            - continue amlodipine and metoprolol  - titrate, as needed    Coronary artery disease  Patient with known CAD s/p stent placement and CABG, which is uncontrolled Will continue ASA and Statin and monitor for S/Sx of angina/ACS. Continue to monitor on telemetry.     - s/p LHC 5/2 by Dr Kumar with stents to vein graft and IABP placed  - tirofiban infusion ongoing  - ASA/ticag oral  - cardiac monitoring  - Cardiology following    Renal/  CKD (chronic kidney disease)  Creatine stable for now. BMP reviewed- noted Estimated Creatinine Clearance: 36 mL/min (A) (based on SCr of 1.7 mg/dL (H)). according to latest data. Based on current GFR, CKD stage is stage 3 - GFR 30-59.  Monitor UOP and serial BMP and adjust therapy as needed. Renally dose meds. Avoid nephrotoxic medications and procedures.    Endocrine  Hypothyroid  - continue Synthroid     Diabetes mellitus  Patient's FSGs are controlled on current medication regimen.  Last A1c reviewed-   Lab Results   Component Value Date    HGBA1C 6.4 (H) 05/02/2024     Most recent fingerstick glucose reviewed-   Recent Labs   Lab 05/02/24  0352 05/02/24  0621 05/02/24  1245   POCTGLUCOSE 268* 227* 174*     Current correctional scale  Medium  Maintain anti-hyperglycemic dose as follows-   Antihyperglycemics (From admission, onward)      Start     Stop Route Frequency Ordered    05/02/24 0900  insulin detemir U-100 (Levemir) pen 11 Units         -- SubQ Daily 05/02/24 0435    05/02/24 0426  insulin aspart U-100 pen 0-10 Units         -- SubQ Every 6 hours PRN 05/02/24 0326          Hold Oral hypoglycemics while patient is in the hospital.       Critical Care Time: 45 minutes  Critical secondary to high risk monitoring     Critical care was time spent personally by me on the following activities: development of treatment plan with patient or surrogate and bedside caregivers, discussions with consultants, evaluation of  patient's response to treatment, examination of patient, ordering and performing treatments and interventions, ordering and review of laboratory studies, ordering and review of radiographic studies, pulse oximetry, re-evaluation of patient's condition. This critical care time did not overlap with that of any other provider or involve time for any procedures.    Thank you for your consult. I will follow-up with patient. Please contact us if you have any additional questions.     Eric Cameron MD  Critical Care Medicine  [facility] - Intensive Care hospitals)

## 2024-05-02 NOTE — ASSESSMENT & PLAN NOTE
Patient's FSGs are uncontrolled due to hyperglycemia on current medication regimen.  Last A1c reviewed-   Lab Results   Component Value Date    HGBA1C 6.3 (H) 03/25/2024     Most recent fingerstick glucose reviewed-   Recent Labs   Lab 05/02/24  0352   POCTGLUCOSE 268*     Current correctional scale  Low  Maintain anti-hyperglycemic dose as follows-   Antihyperglycemics (From admission, onward)      Start     Stop Route Frequency Ordered    05/02/24 0900  insulin detemir U-100 (Levemir) pen 11 Units         -- SubQ Daily 05/02/24 0435    05/02/24 0426  insulin aspart U-100 pen 0-10 Units         -- SubQ Every 6 hours PRN 05/02/24 0326        Plan:  Hold Oral hypoglycemics while patient is in the hospital.  SSI  Accuchecks  Hyperglycemia/Hypoglycemia protocol  25-50% reduction in LA insulin, titrate as needed

## 2024-05-02 NOTE — ASSESSMENT & PLAN NOTE
Chronic, controlled. Latest blood pressure and vitals reviewed-     Temp:  [97.9 °F (36.6 °C)-98.1 °F (36.7 °C)]   Pulse:  [83-94]   Resp:  [18-21]   BP: (121-157)/(72-98)   SpO2:  [90 %-97 %] .   Home meds for hypertension were reviewed and noted below.   Hypertension Medications               amLODIPine (NORVASC) 10 MG tablet Take 5 mg by mouth once daily.    metoprolol succinate (TOPROL-XL) 50 MG 24 hr tablet Take 50 mg by mouth every evening.            While in the hospital, will manage blood pressure as follows; Continue home antihypertensive regimen    Will utilize p.r.n. blood pressure medication only if patient's blood pressure greater than 160/100 and he develops symptoms such as worsening chest pain or shortness of breath.

## 2024-05-02 NOTE — HPI
Mr. Palacio is a 73 year old male patient whose current medical conditions include CAD s/p remote CABG in 2006 (LIMA-LAD, SVG-Ramus, SVG-RCA), hyperlipidemia, HTN, and statin intolerance who presented to [facility] ED overnight due to persistent heavy left shoulder pain. Associated symptoms included jaw discomfort, nausea, and pain near his left pectoral muscle. He denied any associated vomiting, diaphoresis, palpitations, near syncope, or syncope. Reported he had been dealing with left shoulder pain for quite some time due to prior fall and thought it was due to MSK/arthritic pain but became concerned when pain persisted despite warm compresses/hot shower. Initial workup in ED revealed troponin of 0.072>0.1759 and BNP of 978. Patient subsequently admitted for further evaluation and treatment. Cardiology consulted to assist with management. Patient seen and examined today, resting in bed. Feeling better. Denies any left shoulder pain/CP during exam. No other CV complaints. He reports compliance with his medications. Followed on OP basis by Dr. Montana. Troponin bumped up to 41. EKG reviewed, shows ischemic changes/QRS widening in multiple leads. Preliminary bedside read of TTE by MD revealed depressed EF. Plans for R/LHC today pending repeat BMP.

## 2024-05-02 NOTE — ED NOTES
Pt oxygen noted to be 89% when laying 45 degrees in bed, positioned pt at about 80 degrees, current 96%.

## 2024-05-02 NOTE — PROGRESS NOTES
"  [facility]) nurse navigator notified of HFTCC candidate in need of education and introduction to 4-6 week program.      PT aao x 3 while lying in bed  with wife at bedside. Introduced self to pt as HFTCC nurse navigator.     Patient given "Home Care Guide for Heart Failure Patients" , "[facility]" flyer and "Daily weight and symptom tracker".  Encouraged pt and caregiver to review information.      Reviewed the following key points of HFTCC program with pt and family:   1.) Take your medications as directed.    2.) Weight yourself daily   3.) Follow low salt and limited fluid diet.    4.) Stop smoking and start exercising   5.) Go to your appointments and call your team.      Pt reminded to follow Symptom tracker and to call at the onset of symptoms according to tracker.     Reviewed plan for follow up once discharged to include phone calls, in person and virtual visits to assist pt optimizing their heart failure medication regimen and encouraging healthy lifestyle modifications.  Reminded pt that program will assist them over the next 4-6 weeks and then patient will be transferred to long term care provider .  Reminded pt how to contact HFTCC navigator via phone and or via [employer].     Pt planning on f/u with his primary cardiologist Dr Montana after discharge    Pt also reminded HF nurse will call 48-72 hours after discharge to check on them.     PT and family verbalize read back of information given.  Encouraged pt and family to read over information often and contact team with any questions or concerns.     "

## 2024-05-02 NOTE — HPI
Mr Palacio is a 74 y/o WM with CAD s/p CABG, DM, HTN, and HLD with statin intolerance who presented last night with chest and left arm/jaw pain.  Had been having pain on and off since November.  He was initially admitted to the Hospitalist service for work-up.  Troponin went to >50 on trending and LVEF was 30-35% on Echo, so he went to the cath lab this afternoon with Dr Kumar.  LHC showed mostly occluded native vessels with a patent lima to LAD and  SVG to PDA that was treated with stenting.  High filling pressures and moderate pulmonary hypertension, so IABP placed and he was given 40mg IV Lasix in the cath lab, as well.  He comes to the ICU post-procedure with the IABP, hence Critical Care consultation.    I examined him following arrival to the ICU.  He is awake and conversant.  Denies dyspnea, chest pain.  No numbness/tingling.  Distal circulation intact.  Otherwise without complaint.

## 2024-05-02 NOTE — ASSESSMENT & PLAN NOTE
Patient presents with NSTEMI. Chest pain is currently controlled. SANKET score is 6. Patient is currently on NSTEMI Pathway.    EKG reviewed. Troponins reviewed and results noted-   Recent Labs   Lab 05/02/24 0222   TROPONINI 1.759*   .     Lipid panel reviewed and shows-     Lab Results   Component Value Date    LDLCALC 213 (H) 05/06/2021     Lab Results   Component Value Date    TRIG 142 05/06/2021         Medical management includes; Beta Blocker, Anticoagulation, ACE/ARB, and Nitrate Echo has not been performed. Latest ECHO results are as follows- No results found for this or any previous visit.  .   Consult for cardiac rehab is not ordered. Patient counseled on lifestyle modifications- begin progressive daily aerobic exercise program, follow a low fat, low cholesterol diet, reduce salt in diet and cooking, reduce exposure to stress, improve dietary compliance, continue current medications, continue current healthy lifestyle patterns, and return for routine annual checkups. Cardiology is consulted. Plan of care not reviewed with cardiology team. Continue to monitor patient closely and adjust therapy as needed.

## 2024-05-02 NOTE — ASSESSMENT & PLAN NOTE
Creatine stable for now. BMP reviewed- noted Estimated Creatinine Clearance: 36 mL/min (A) (based on SCr of 1.7 mg/dL (H)). according to latest data. Based on current GFR, CKD stage is stage 3 - GFR 30-59.  Monitor UOP and serial BMP and adjust therapy as needed. Renally dose meds. Avoid nephrotoxic medications and procedures.

## 2024-05-02 NOTE — SUBJECTIVE & OBJECTIVE
Past Medical History:   Diagnosis Date    CAD (coronary artery disease) of artery bypass graft     Diabetes mellitus     HLD (hyperlipidemia)     Hypertension     Statin intolerance        Past Surgical History:   Procedure Laterality Date    ARTHROPLASTY OF HIP BY ANTERIOR APPROACH Left 11/25/2022    Procedure: ARTHROPLASTY, HIP, TOTAL, ANTERIOR APPROACH;  Surgeon: Félix Ventura MD;  Location: [facility];  Service: Orthopedics;  Laterality: Left;    CORONARY ARTERY BYPASS GRAFT (CABG)         Review of patient's allergies indicates:   Allergen Reactions    Statins-hmg-coa reductase inhibitors Other (See Comments)     Muscle aches  Joint Stiffness       Current Facility-Administered Medications   Medication Dose Route Frequency Provider Last Rate Last Admin    0.9%  NaCl infusion   Intravenous Continuous Christelle Gonzalez PA-C 75 mL/hr at 05/02/24 0835 New Bag at 05/02/24 0835    amLODIPine tablet 5 mg  5 mg Oral Daily Kwame Brown NP   5 mg at 05/02/24 0817    [START ON 5/3/2024] aspirin chewable tablet 81 mg  81 mg Oral Daily Kwame Brown NP        dextrose 10% bolus 125 mL 125 mL  12.5 g Intravenous PRN Kwame Brown NP        dextrose 10% bolus 250 mL 250 mL  25 g Intravenous PRN Kwame Brown NP        ferrous sulfate tablet 1 each  1 tablet Oral Daily Kwame Brown NP   1 each at 05/02/24 0817    glucagon (human recombinant) injection 1 mg  1 mg Intramuscular PRN Kwame Brown NP        heparin 25,000 units in dextrose 5% (100 units/ml) IV bolus from bag LOW INTENSITY nomogram - [facility]  51.7 Units/kg Intravenous PRN Samuel Keating MD        heparin 25,000 units in dextrose 5% (100 units/ml) IV bolus from bag LOW INTENSITY nomogram - [facility]  30 Units/kg Intravenous PRN Samuel Keating MD        heparin 25,000 units in dextrose 5% 250 mL (100 units/mL) infusion LOW INTENSITY nomogram - [facility]  0-40 Units/kg/hr Intravenous Continuous Samuel Keating MD 9.3 mL/hr at 05/02/24 0341 12  Units/kg/hr at 05/02/24 0341    insulin aspart U-100 pen 0-10 Units  0-10 Units Subcutaneous Q6H PRN Kwame Brown NP   4 Units at 05/02/24 0626    insulin detemir U-100 (Levemir) pen 11 Units  11 Units Subcutaneous Daily Kwame Brown NP   11 Units at 05/02/24 0818    levothyroxine tablet 50 mcg  50 mcg Oral Before breakfast Kwame Brown NP   50 mcg at 05/02/24 0622    metoprolol succinate (TOPROL-XL) 24 hr tablet 50 mg  50 mg Oral QHS Kwame Brown NP        nitroGLYCERIN SL tablet 0.4 mg  0.4 mg Sublingual Q5 Min PRN Kwmae Brown NP        tamsulosin 24 hr capsule 0.4 mg  0.4 mg Oral Daily Kwame Brown NP   0.4 mg at 05/02/24 0817     Family History    None       Tobacco Use    Smoking status: Former     Types: Cigarettes     Passive exposure: Past    Smokeless tobacco: Never   Substance and Sexual Activity    Alcohol use: Never    Drug use: Never    Sexual activity: Not on file     Review of Systems   Constitutional: Positive for malaise/fatigue.   HENT:          Jaw pain   Eyes: Negative.    Cardiovascular: Negative.    Respiratory: Negative.     Endocrine: Negative.    Hematologic/Lymphatic: Negative.    Skin: Negative.    Musculoskeletal:  Positive for arthritis and joint pain (L shoulder).   Gastrointestinal:  Positive for nausea.   Genitourinary: Negative.    Neurological:  Positive for weakness.   Psychiatric/Behavioral: Negative.     Allergic/Immunologic: Negative.      Objective:     Vital Signs (Most Recent):  Temp: 97.7 °F (36.5 °C) (05/02/24 0810)  Pulse: 81 (05/02/24 0810)  Resp: 18 (05/02/24 0810)  BP: (!) 144/82 (05/02/24 0810)  SpO2: 95 % (05/02/24 0810) Vital Signs (24h Range):  Temp:  [97.7 °F (36.5 °C)-98.1 °F (36.7 °C)] 97.7 °F (36.5 °C)  Pulse:  [81-94] 81  Resp:  [18-21] 18  SpO2:  [90 %-97 %] 95 %  BP: (121-157)/(72-98) 144/82     Weight: 77.1 kg (170 lb)  Body mass index is 28.29 kg/m².    SpO2: 95 %         Intake/Output Summary (Last 24 hours) at  5/2/2024 1034  Last data filed at 5/2/2024 0815  Gross per 24 hour   Intake 0 ml   Output 900 ml   Net -900 ml       Lines/Drains/Airways       Peripheral Intravenous Line  Duration                  Peripheral IV - Single Lumen 05/01/24 2329 20 G Right Antecubital <1 day         Peripheral IV - Single Lumen 05/02/24 0315 20 G Left Antecubital <1 day                     Physical Exam  Vitals and nursing note reviewed.   Constitutional:       General: He is not in acute distress.     Appearance: He is well-developed. He is ill-appearing. He is not diaphoretic.      Comments: On supplemental O2 via NC   HENT:      Head: Normocephalic and atraumatic.   Eyes:      General:         Right eye: No discharge.         Left eye: No discharge.      Pupils: Pupils are equal, round, and reactive to light.   Cardiovascular:      Rate and Rhythm: Normal rate and regular rhythm.      Heart sounds: Normal heart sounds, S1 normal and S2 normal. No murmur heard.  Pulmonary:      Effort: Pulmonary effort is normal. No respiratory distress.      Breath sounds: Rales present. No wheezing.   Abdominal:      General: There is no distension.   Musculoskeletal:      Right lower leg: Edema present.      Left lower leg: Edema present.   Skin:     General: Skin is dry.      Findings: No erythema.      Comments: BLE cool   Neurological:      Mental Status: He is alert and oriented to person, place, and time.          Significant Labs: CMP   Recent Labs   Lab 05/01/24  2318      K 4.0      CO2 22*   *   BUN 23   CREATININE 1.8*   CALCIUM 9.4   PROT 7.4   ALBUMIN 3.6   BILITOT 0.4   ALKPHOS 79   AST 16   ALT 17   ANIONGAP 11   , CBC   Recent Labs   Lab 05/01/24  2318 05/02/24  0322   WBC 8.94 11.71   HGB 14.3 14.7   HCT 44.6 46.0    276   , Troponin   Recent Labs   Lab 05/02/24  0222 05/02/24  0614 05/02/24  0949   TROPONINI 1.759* 41.611* >50.000*   , and All pertinent lab results from the last 24 hours have been  reviewed.    Significant Imaging: Echocardiogram: Transthoracic echo (TTE) complete (Cupid Only):   Results for orders placed or performed during the hospital encounter of 05/01/24   Echo   Result Value Ref Range    BSA 1.88 m2    Pineda's Biplane MOD Ejection Fraction 26 %    LVOT stroke volume 47.18 cm3    LVIDd 5.64 3.5 - 6.0 cm    LV Systolic Volume 110.97 mL    LV Systolic Volume Index 60.0 mL/m2    LVIDs 4.87 (A) 2.1 - 4.0 cm    LV Diastolic Volume 156.29 mL    LV Diastolic Volume Index 84.48 mL/m2    IVS 0.95 0.6 - 1.1 cm    LVOT diameter 2.05 cm    LVOT area 3.3 cm2    FS 14 (A) 28 - 44 %    Left Ventricle Relative Wall Thickness 0.25 cm    Posterior Wall 0.71 0.6 - 1.1 cm    LV mass 175.00 g    LV Mass Index 95 g/m2    MV Peak E Chandra 0.50 m/s    TDI SEPTAL 0.04 m/s    MV Peak A Chandra 0.61 m/s    TR Max Chandra 2.38 m/s    E/A ratio 0.82     IVRT 110.37 msec    E wave deceleration time 146.15 msec    LV SEPTAL E/E' RATIO 12.50 m/s    LVOT peak chandra 0.72 m/s    Left Ventricular Outflow Tract Mean Velocity 0.51 cm/s    Left Ventricular Outflow Tract Mean Gradient 1.19 mmHg    RVDD 3.43 cm    RVOT peak VTI 12.2 cm    TAPSE 1.34 cm    RV/LV Ratio 0.61 cm    LA size 4.33 cm    Left Atrium Minor Axis 4.94 cm    Left Atrium Major Axis 5.34 cm    RA Major Axis 4.26 cm    Vn Nyquist MS 0.33 m/s    AV mean gradient 2 mmHg    AV peak gradient 4 mmHg    Ao peak chandra 1.05 m/s    Ao VTI 20.30 cm    LVOT peak VTI 14.30 cm    AV valve area 2.32 cm²    AV Velocity Ratio 0.69     AV index (prosthetic) 0.70     ANTWAN by Velocity Ratio 2.26 cm²    Radius 0.65 cm    Vn Nyquist 0.33 m/s    Mr max chandra 5.48 m/s    MR PISA EROA 0.16 cm2    MV stenosis pressure 1/2 time 42.38 ms    MV valve area p 1/2 method 5.19 cm2    Triscuspid Valve Regurgitation Peak Gradient 23 mmHg    PV mean gradient 1 mmHg    PV PEAK VELOCITY 0.79 m/s    PV peak gradient 2 mmHg    RVOT peak chandra 0.65 m/s    Ao root annulus 3.11 cm    STJ 2.91 cm    Ascending aorta 3.13  cm    IVC diameter 1.92 cm    ZLVIDS 3.39     ZLVIDD 0.93     LA Volume Index 43.9 mL/m2    LA volume 81.22 cm3    LA WIDTH 4.3 cm    RA Width 3.5 cm    and EKG: Reviewed

## 2024-05-02 NOTE — SUBJECTIVE & OBJECTIVE
Past Medical History:   Diagnosis Date    CAD (coronary artery disease) of artery bypass graft     Diabetes mellitus     HLD (hyperlipidemia)     Hypertension     Statin intolerance        Past Surgical History:   Procedure Laterality Date    ARTHROPLASTY OF HIP BY ANTERIOR APPROACH Left 11/25/2022    Procedure: ARTHROPLASTY, HIP, TOTAL, ANTERIOR APPROACH;  Surgeon: Félix Ventura MD;  Location: [facility];  Service: Orthopedics;  Laterality: Left;    CORONARY ARTERY BYPASS GRAFT (CABG)         Review of patient's allergies indicates:   Allergen Reactions    Statins-hmg-coa reductase inhibitors Other (See Comments)     Muscle aches  Joint Stiffness       Current Facility-Administered Medications   Medication Dose Route Frequency Provider Last Rate Last Admin    amLODIPine tablet 5 mg  5 mg Oral Daily Kwame Brown NP        [START ON 5/3/2024] aspirin chewable tablet 81 mg  81 mg Oral Daily Kwame Brown NP        dextrose 10% bolus 125 mL 125 mL  12.5 g Intravenous PRN Kwame Brown NP        dextrose 10% bolus 250 mL 250 mL  25 g Intravenous PRN Kwame Brown NP        ferrous sulfate tablet 1 each  1 tablet Oral Daily Kwame Brown NP        glucagon (human recombinant) injection 1 mg  1 mg Intramuscular PRN Kwame Brown NP        heparin 25,000 units in dextrose 5% (100 units/ml) IV bolus from bag LOW INTENSITY nomogram - [facility]  51.7 Units/kg Intravenous PRN Samuel Keating MD        heparin 25,000 units in dextrose 5% (100 units/ml) IV bolus from bag LOW INTENSITY nomogram - [facility]  30 Units/kg Intravenous PRN Samuel Keating MD        heparin 25,000 units in dextrose 5% 250 mL (100 units/mL) infusion LOW INTENSITY nomogram - [facility]  0-40 Units/kg/hr Intravenous Continuous Samuel Keating MD 9.3 mL/hr at 05/02/24 0341 12 Units/kg/hr at 05/02/24 0341    insulin aspart U-100 pen 0-10 Units  0-10 Units Subcutaneous Q6H PRN Kwame Brown NP        insulin detemir U-100 (Levemir) pen 11  Units  11 Units Subcutaneous Daily Kwame Brown NP        levothyroxine tablet 50 mcg  50 mcg Oral Before breakfast Kwame Brown NP        metoprolol succinate (TOPROL-XL) 24 hr tablet 50 mg  50 mg Oral QHS Kwame Brown NP        nitroGLYCERIN SL tablet 0.4 mg  0.4 mg Sublingual Q5 Min PRN Kwame Brown NP        tamsulosin 24 hr capsule 0.4 mg  0.4 mg Oral Daily Kwame Brown NP         Family History    None       Tobacco Use    Smoking status: Former     Types: Cigarettes     Passive exposure: Past    Smokeless tobacco: Never   Substance and Sexual Activity    Alcohol use: Never    Drug use: Never    Sexual activity: Not on file     Review of Systems   Respiratory:  Positive for cough (Chronic). Negative for shortness of breath.    Cardiovascular:  Positive for chest pain and leg swelling. Negative for palpitations.   Musculoskeletal:  Positive for arthralgias and myalgias.   All other systems reviewed and are negative.    Objective:     Vital Signs (Most Recent):  Temp: 97.9 °F (36.6 °C) (05/02/24 0445)  Pulse: 87 (05/02/24 0445)  Resp: 18 (05/02/24 0445)  BP: (!) 146/85 (05/02/24 0445)  SpO2: (!) 94 % (05/02/24 0445) Vital Signs (24h Range):  Temp:  [97.9 °F (36.6 °C)-98.1 °F (36.7 °C)] 97.9 °F (36.6 °C)  Pulse:  [83-94] 87  Resp:  [18-21] 18  SpO2:  [90 %-97 %] 94 %  BP: (121-157)/(72-98) 146/85     Weight: 80.7 kg (177 lb 14.6 oz)  Body mass index is 29.61 kg/m².     Physical Exam  Vitals and nursing note reviewed.   Constitutional:       General: He is awake. He is not in acute distress.     Appearance: Normal appearance. He is well-developed and well-groomed. He is not ill-appearing, toxic-appearing or diaphoretic.   HENT:      Head: Normocephalic and atraumatic.      Mouth/Throat:      Mouth: Mucous membranes are moist.      Pharynx: Oropharynx is clear.   Eyes:      Extraocular Movements: Extraocular movements intact.      Conjunctiva/sclera: Conjunctivae normal.    Cardiovascular:      Rate and Rhythm: Normal rate and regular rhythm.      Pulses: Normal pulses.      Heart sounds: Normal heart sounds. No murmur heard.  Pulmonary:      Effort: Pulmonary effort is normal.      Breath sounds: Decreased breath sounds present. No wheezing, rhonchi or rales.   Abdominal:      General: Bowel sounds are normal.      Palpations: Abdomen is soft.      Tenderness: There is no abdominal tenderness.   Musculoskeletal:      Cervical back: Normal range of motion and neck supple.      Right lower le+ Pitting Edema present.      Left lower le+ Pitting Edema present.      Comments: 5/5 strength throughout   Skin:     General: Skin is warm and dry.      Capillary Refill: Capillary refill takes less than 2 seconds.   Neurological:      General: No focal deficit present.      Mental Status: He is alert and oriented to person, place, and time. Mental status is at baseline.      GCS: GCS eye subscore is 4. GCS verbal subscore is 5. GCS motor subscore is 6.      Cranial Nerves: Cranial nerves 2-12 are intact.      Sensory: Sensation is intact.   Psychiatric:         Mood and Affect: Mood normal.         Behavior: Behavior normal. Behavior is cooperative.              LABS:  Recent Results (from the past 24 hour(s))   CBC auto differential    Collection Time: 24 11:18 PM   Result Value Ref Range    WBC 8.94 3.90 - 12.70 K/uL    RBC 4.99 4.60 - 6.20 M/uL    Hemoglobin 14.3 14.0 - 18.0 g/dL    Hematocrit 44.6 40.0 - 54.0 %    MCV 89 82 - 98 fL    MCH 28.7 27.0 - 31.0 pg    MCHC 32.1 32.0 - 36.0 g/dL    RDW 15.0 (H) 11.5 - 14.5 %    Platelets 275 150 - 450 K/uL    MPV 9.9 9.2 - 12.9 fL    Immature Granulocytes 0.9 (H) 0.0 - 0.5 %    Gran # (ANC) 6.7 1.8 - 7.7 K/uL    Immature Grans (Abs) 0.08 (H) 0.00 - 0.04 K/uL    Lymph # 1.3 1.0 - 4.8 K/uL    Mono # 0.6 0.3 - 1.0 K/uL    Eos # 0.2 0.0 - 0.5 K/uL    Baso # 0.09 0.00 - 0.20 K/uL    nRBC 0 0 /100 WBC    Gran % 74.7 (H) 38.0 - 73.0 %     Lymph % 14.5 (L) 18.0 - 48.0 %    Mono % 6.8 4.0 - 15.0 %    Eosinophil % 2.1 0.0 - 8.0 %    Basophil % 1.0 0.0 - 1.9 %    Differential Method Automated    Comprehensive metabolic panel    Collection Time: 05/01/24 11:18 PM   Result Value Ref Range    Sodium 141 136 - 145 mmol/L    Potassium 4.0 3.5 - 5.1 mmol/L    Chloride 108 95 - 110 mmol/L    CO2 22 (L) 23 - 29 mmol/L    Glucose 285 (H) 70 - 110 mg/dL    BUN 23 8 - 23 mg/dL    Creatinine 1.8 (H) 0.5 - 1.4 mg/dL    Calcium 9.4 8.7 - 10.5 mg/dL    Total Protein 7.4 6.0 - 8.4 g/dL    Albumin 3.6 3.5 - 5.2 g/dL    Total Bilirubin 0.4 0.1 - 1.0 mg/dL    Alkaline Phosphatase 79 55 - 135 U/L    AST 16 10 - 40 U/L    ALT 17 10 - 44 U/L    eGFR 39 (A) >60 mL/min/1.73 m^2    Anion Gap 11 8 - 16 mmol/L   Troponin I #1    Collection Time: 05/01/24 11:18 PM   Result Value Ref Range    Troponin I 0.072 (H) 0.000 - 0.026 ng/mL   BNP    Collection Time: 05/01/24 11:18 PM   Result Value Ref Range     (H) 0 - 99 pg/mL   Troponin I #2    Collection Time: 05/02/24  2:22 AM   Result Value Ref Range    Troponin I 1.759 (H) 0.000 - 0.026 ng/mL   APTT    Collection Time: 05/02/24  3:22 AM   Result Value Ref Range    aPTT 27.4 21.0 - 32.0 sec   Protime-INR    Collection Time: 05/02/24  3:22 AM   Result Value Ref Range    Prothrombin Time 11.4 9.0 - 12.5 sec    INR 1.0 0.8 - 1.2   CBC auto differential    Collection Time: 05/02/24  3:22 AM   Result Value Ref Range    WBC 11.71 3.90 - 12.70 K/uL    RBC 5.08 4.60 - 6.20 M/uL    Hemoglobin 14.7 14.0 - 18.0 g/dL    Hematocrit 46.0 40.0 - 54.0 %    MCV 91 82 - 98 fL    MCH 28.9 27.0 - 31.0 pg    MCHC 32.0 32.0 - 36.0 g/dL    RDW 14.9 (H) 11.5 - 14.5 %    Platelets 276 150 - 450 K/uL    MPV 9.7 9.2 - 12.9 fL    Immature Granulocytes 0.9 (H) 0.0 - 0.5 %    Gran # (ANC) 10.4 (H) 1.8 - 7.7 K/uL    Immature Grans (Abs) 0.11 (H) 0.00 - 0.04 K/uL    Lymph # 0.7 (L) 1.0 - 4.8 K/uL    Mono # 0.4 0.3 - 1.0 K/uL    Eos # 0.0 0.0 - 0.5 K/uL     Baso # 0.08 0.00 - 0.20 K/uL    nRBC 0 0 /100 WBC    Gran % 88.9 (H) 38.0 - 73.0 %    Lymph % 6.0 (L) 18.0 - 48.0 %    Mono % 3.5 (L) 4.0 - 15.0 %    Eosinophil % 0.0 0.0 - 8.0 %    Basophil % 0.7 0.0 - 1.9 %    Differential Method Automated    POCT glucose    Collection Time: 05/02/24  3:52 AM   Result Value Ref Range    POCT Glucose 268 (H) 70 - 110 mg/dL       RADIOLOGY  No results found.    EKG    MICROBIOLOGY    MDM     Amount and/or Complexity of Data Reviewed  Clinical lab tests: reviewed  Tests in the radiology section of CPT®: reviewed  Tests in the medicine section of CPT®: reviewed  Discussion of test results with the performing providers: yes  Decide to obtain previous medical records or to obtain history from someone other than the patient: yes  Obtain history from someone other than the patient: yes  Review and summarize past medical records: yes  Discuss the patient with other providers: yes  Independent visualization of images, tracings, or specimens: yes

## 2024-05-02 NOTE — ASSESSMENT & PLAN NOTE
- Troponin peaked >50  - s/p LHC with stents to vein graft and IABP placement 5/2  - Cardiology following  - Cardiac monitoring

## 2024-05-02 NOTE — INTERVAL H&P NOTE
The patient has been examined and the H&P has been reviewed:    I concur with the findings and no changes have occurred since H&P was written.    Procedure risks, benefits and alternative options discussed and understood by patient/family.          Active Hospital Problems    Diagnosis  POA    *NSTEMI (non-ST elevated myocardial infarction) [I21.4]  Unknown    Acute exacerbation of CHF (congestive heart failure) [I50.9]  Unknown    CKD (chronic kidney disease) [N18.9]  Unknown    Coronary artery disease [I25.10]  Yes    Diabetes mellitus [E11.9]  Yes    Hypertension [I10]  Yes    Hypothyroid [E03.9]  Yes      Resolved Hospital Problems   No resolved problems to display.

## 2024-05-02 NOTE — ED PROVIDER NOTES
SCRIBE #1 NOTE: I, Gal Carly, am scribing for, and in the presence of, Samuel Keating MD. I have scribed the entire note.       History     Chief Complaint   Patient presents with    Chest Pain     Intermittent sharp, severe, left sided CP since November. Tonight unrelieved and radiating to left jaw. States was seen recently and told it was muscular needing shoulder surgery and not cardiac in nature. has hx of triple bypass 17 years ago. SOB, pain on inspiration with non productive cough x 1 year     Review of patient's allergies indicates:   Allergen Reactions    Statins-hmg-coa reductase inhibitors Other (See Comments)     Muscle aches  Joint Stiffness         History of Present Illness     HPI    5/1/2024, 11:10 PM  History obtained from the patient      History of Present Illness: Nahum Palacio is a 73 y.o. male patient with a PMHx of CAD, DM, HTN who presents to the Emergency Department for evaluation of left sided CP which onset gradually 6 PM today. Patient states he has chronic episodes of left shoulder pain after a fall several months ago that is usually relieved by warm compress/shower. He notes his pain is similar to previous episodes but is unrelieved by a hot shower and Tylenol which prompted him to come to the ED. He describes his pain to start in his left upper chest and radiates to his left shoulder. Symptoms are constant and moderate in severity. No mitigating or exacerbating factors reported. Associated sxs includes N/V. Patient denies any abdominal pain, fever, diaphoresis, SOB, CP, weakness, and all other sxs at this time. No further complaints or concerns at this time.       Arrival mode: Personal vehicle    PCP: Eric Foster MD        Past Medical History:  Past Medical History:   Diagnosis Date    CAD (coronary artery disease) of artery bypass graft     Diabetes mellitus     HLD (hyperlipidemia)     Hypertension     Statin intolerance        Past Surgical History:  Past Surgical  History:   Procedure Laterality Date    ARTHROPLASTY OF HIP BY ANTERIOR APPROACH Left 11/25/2022    Procedure: ARTHROPLASTY, HIP, TOTAL, ANTERIOR APPROACH;  Surgeon: Félix Ventura MD;  Location: [facility];  Service: Orthopedics;  Laterality: Left;    CORONARY ARTERY BYPASS GRAFT (CABG)           Family History:  No family history on file.    Social History:  Social History     Tobacco Use    Smoking status: Former     Types: Cigarettes     Passive exposure: Past    Smokeless tobacco: Never   Substance and Sexual Activity    Alcohol use: Never    Drug use: Never    Sexual activity: Not on file        Review of Systems     Review of Systems   Constitutional:  Negative for diaphoresis and fever.   HENT:  Negative for sore throat.    Respiratory:  Negative for cough and shortness of breath.    Cardiovascular:  Positive for chest pain.   Gastrointestinal:  Positive for nausea and vomiting. Negative for abdominal pain.   Genitourinary:  Negative for dysuria.   Musculoskeletal:  Positive for arthralgias (left shoulder). Negative for back pain.   Skin:  Negative for rash.   Neurological:  Negative for weakness.   Hematological:  Does not bruise/bleed easily.   All other systems reviewed and are negative.       Physical Exam     Initial Vitals [05/01/24 2252]   BP Pulse Resp Temp SpO2   121/72 83 18 98.1 °F (36.7 °C) 97 %      MAP       --          Physical Exam   Nursing Notes and Vital Signs Reviewed.  Constitutional: Patient is in no acute distress. Well-developed and well-nourished.  Head: Atraumatic. Normocephalic.  Eyes: PERRL. EOM intact. Conjunctivae are not pale. No scleral icterus.  ENT: Mucous membranes are moist. Oropharynx is clear and symmetric.    Neck: Supple. Full ROM. No lymphadenopathy.  Cardiovascular: Regular rate. Regular rhythm. No murmurs, rubs, or gallops. Distal pulses are 2+ and symmetric.  Pulmonary/Chest: No respiratory distress. Clear to auscultation bilaterally. No wheezing or  rales.  Abdominal: Soft and non-distended.  There is no tenderness.  No rebound, guarding, or rigidity. Good bowel sounds.  Genitourinary: No CVA tenderness  Musculoskeletal: Moves all extremities. No obvious deformities. No edema. No calf tenderness.  Skin: Warm and dry.  Neurological:  Alert, awake, and appropriate.  Normal speech.  No acute focal neurological deficits are appreciated.  Psychiatric: Normal affect. Good eye contact. Appropriate in content.     ED Course   Critical Care    Date/Time: 5/2/2024 3:05 AM    Performed by: Samuel Keating MD  Authorized by: Samuel Keating MD  Direct patient critical care time: 35 minutes  Additional history critical care time: 15 minutes  Ordering / reviewing critical care time: 10 minutes  Documentation critical care time: 5 minutes  Consulting other physicians critical care time: 5 minutes  Total critical care time (exclusive of procedural time) : 70 minutes  Critical care time was exclusive of separately billable procedures and treating other patients and teaching time.  Critical care was necessary to treat or prevent imminent or life-threatening deterioration of the following conditions: NSTEMI.  Critical care was time spent personally by me on the following activities: development of treatment plan with patient or surrogate, blood draw for specimens, discussions with consultants, interpretation of cardiac output measurements, evaluation of patient's response to treatment, examination of patient, obtaining history from patient or surrogate, ordering and performing treatments and interventions, ordering and review of laboratory studies, ordering and review of radiographic studies, pulse oximetry, re-evaluation of patient's condition and review of old charts.        ED Vital Signs:  Vitals:    05/01/24 2249 05/01/24 2252 05/01/24 2327 05/01/24 2332   BP:  121/72  (!) 150/91   Pulse:  83 91 88   Resp:  18 20 (!) 21   Temp:  98.1 °F (36.7 °C)     TempSrc:  Oral     SpO2:   "97% (!) 90% (!) 93%   Weight: 77.3 kg (170 lb 6.7 oz)      Height:  5' 5" (1.651 m)      05/01/24 2334 05/01/24 2354 05/02/24 0144 05/02/24 0320   BP:   (!) 155/92 (!) 157/98   Pulse:   83 94   Resp:   18 20   Temp:       TempSrc:       SpO2: (!) 92% 95% (!) 92% (!) 94%   Weight:       Height:        05/02/24 0445 05/02/24 0506   BP: (!) 146/85    Pulse: 87 86   Resp: 18    Temp: 97.9 °F (36.6 °C)    TempSrc: Oral    SpO2: (!) 94%    Weight: 80.7 kg (177 lb 14.6 oz)    Height:         Abnormal Lab Results:  Labs Reviewed   CBC W/ AUTO DIFFERENTIAL - Abnormal; Notable for the following components:       Result Value    RDW 15.0 (*)     Immature Granulocytes 0.9 (*)     Immature Grans (Abs) 0.08 (*)     Gran % 74.7 (*)     Lymph % 14.5 (*)     All other components within normal limits   COMPREHENSIVE METABOLIC PANEL - Abnormal; Notable for the following components:    CO2 22 (*)     Glucose 285 (*)     Creatinine 1.8 (*)     eGFR 39 (*)     All other components within normal limits   TROPONIN I - Abnormal; Notable for the following components:    Troponin I 0.072 (*)     All other components within normal limits   B-TYPE NATRIURETIC PEPTIDE - Abnormal; Notable for the following components:     (*)     All other components within normal limits   TROPONIN I - Abnormal; Notable for the following components:    Troponin I 1.759 (*)     All other components within normal limits   CBC W/ AUTO DIFFERENTIAL - Abnormal; Notable for the following components:    RDW 14.9 (*)     Immature Granulocytes 0.9 (*)     Gran # (ANC) 10.4 (*)     Immature Grans (Abs) 0.11 (*)     Lymph # 0.7 (*)     Gran % 88.9 (*)     Lymph % 6.0 (*)     Mono % 3.5 (*)     All other components within normal limits    Narrative:     PTT daily if no changes in infusion rate   POCT GLUCOSE - Abnormal; Notable for the following components:    POCT Glucose 268 (*)     All other components within normal limits   APTT    Narrative:     Draw baseline " aPTT prior to starting the heparin bolus or  infusion  (if patient is on warfarin prior to heparin therapy)   PROTIME-INR    Narrative:     Draw baseline aPTT prior to starting the heparin bolus or  infusion  (if patient is on warfarin prior to heparin therapy)   HEMOGLOBIN A1C   LIPID PANEL   TROPONIN I   LIPID PANEL   HEMOGLOBIN A1C   TYPE & SCREEN   POCT GLUCOSE MONITORING CONTINUOUS        All Lab Results:  Results for orders placed or performed during the hospital encounter of 05/01/24   CBC auto differential   Result Value Ref Range    WBC 8.94 3.90 - 12.70 K/uL    RBC 4.99 4.60 - 6.20 M/uL    Hemoglobin 14.3 14.0 - 18.0 g/dL    Hematocrit 44.6 40.0 - 54.0 %    MCV 89 82 - 98 fL    MCH 28.7 27.0 - 31.0 pg    MCHC 32.1 32.0 - 36.0 g/dL    RDW 15.0 (H) 11.5 - 14.5 %    Platelets 275 150 - 450 K/uL    MPV 9.9 9.2 - 12.9 fL    Immature Granulocytes 0.9 (H) 0.0 - 0.5 %    Gran # (ANC) 6.7 1.8 - 7.7 K/uL    Immature Grans (Abs) 0.08 (H) 0.00 - 0.04 K/uL    Lymph # 1.3 1.0 - 4.8 K/uL    Mono # 0.6 0.3 - 1.0 K/uL    Eos # 0.2 0.0 - 0.5 K/uL    Baso # 0.09 0.00 - 0.20 K/uL    nRBC 0 0 /100 WBC    Gran % 74.7 (H) 38.0 - 73.0 %    Lymph % 14.5 (L) 18.0 - 48.0 %    Mono % 6.8 4.0 - 15.0 %    Eosinophil % 2.1 0.0 - 8.0 %    Basophil % 1.0 0.0 - 1.9 %    Differential Method Automated    Comprehensive metabolic panel   Result Value Ref Range    Sodium 141 136 - 145 mmol/L    Potassium 4.0 3.5 - 5.1 mmol/L    Chloride 108 95 - 110 mmol/L    CO2 22 (L) 23 - 29 mmol/L    Glucose 285 (H) 70 - 110 mg/dL    BUN 23 8 - 23 mg/dL    Creatinine 1.8 (H) 0.5 - 1.4 mg/dL    Calcium 9.4 8.7 - 10.5 mg/dL    Total Protein 7.4 6.0 - 8.4 g/dL    Albumin 3.6 3.5 - 5.2 g/dL    Total Bilirubin 0.4 0.1 - 1.0 mg/dL    Alkaline Phosphatase 79 55 - 135 U/L    AST 16 10 - 40 U/L    ALT 17 10 - 44 U/L    eGFR 39 (A) >60 mL/min/1.73 m^2    Anion Gap 11 8 - 16 mmol/L   Troponin I #1   Result Value Ref Range    Troponin I 0.072 (H) 0.000 - 0.026 ng/mL    BNP   Result Value Ref Range     (H) 0 - 99 pg/mL   Troponin I #2   Result Value Ref Range    Troponin I 1.759 (H) 0.000 - 0.026 ng/mL   APTT   Result Value Ref Range    aPTT 27.4 21.0 - 32.0 sec   Protime-INR   Result Value Ref Range    Prothrombin Time 11.4 9.0 - 12.5 sec    INR 1.0 0.8 - 1.2   CBC auto differential   Result Value Ref Range    WBC 11.71 3.90 - 12.70 K/uL    RBC 5.08 4.60 - 6.20 M/uL    Hemoglobin 14.7 14.0 - 18.0 g/dL    Hematocrit 46.0 40.0 - 54.0 %    MCV 91 82 - 98 fL    MCH 28.9 27.0 - 31.0 pg    MCHC 32.0 32.0 - 36.0 g/dL    RDW 14.9 (H) 11.5 - 14.5 %    Platelets 276 150 - 450 K/uL    MPV 9.7 9.2 - 12.9 fL    Immature Granulocytes 0.9 (H) 0.0 - 0.5 %    Gran # (ANC) 10.4 (H) 1.8 - 7.7 K/uL    Immature Grans (Abs) 0.11 (H) 0.00 - 0.04 K/uL    Lymph # 0.7 (L) 1.0 - 4.8 K/uL    Mono # 0.4 0.3 - 1.0 K/uL    Eos # 0.0 0.0 - 0.5 K/uL    Baso # 0.08 0.00 - 0.20 K/uL    nRBC 0 0 /100 WBC    Gran % 88.9 (H) 38.0 - 73.0 %    Lymph % 6.0 (L) 18.0 - 48.0 %    Mono % 3.5 (L) 4.0 - 15.0 %    Eosinophil % 0.0 0.0 - 8.0 %    Basophil % 0.7 0.0 - 1.9 %    Differential Method Automated    POCT glucose   Result Value Ref Range    POCT Glucose 268 (H) 70 - 110 mg/dL         Imaging Results:  Imaging Results              X-Ray Chest AP Portable (In process)                      The EKG was ordered, reviewed, and independently interpreted by the ED provider.  Interpretation time: 22:46  Rate: 83 BPM  Rhythm: normal sinus rhythm  Interpretation: Left axis deviation. LVH with QRS widening and repolarization abnormality (Bernard product). No STEMI.    The Emergency Provider reviewed the vital signs and test results, which are outlined above.     ED Discussion          Medical Decision Making  Amount and/or Complexity of Data Reviewed  Labs: ordered. Decision-making details documented in ED Course.  Radiology: ordered. Decision-making details documented in ED Course.  ECG/medicine tests: ordered and  independent interpretation performed. Decision-making details documented in ED Course.  Discussion of management or test interpretation with external provider(s):     3:22 AM: Discussed case with Kwame Brown NP ([facility]). Dr. Brown agrees with current care and management of pt and accepts admission.   Admitting Service: Hospital Medicine  Admitting Physician: Dr. Brown  Admit to: Inpatient med tele    3:22 AM: Re-evaluated pt. I have discussed test results, shared treatment plan, and the need for admission with patient and family at bedside. Pt and family express understanding at this time and agree with all information. All questions answered. Pt and family have no further questions or concerns at this time. Pt is ready for admit.    Risk  OTC drugs.  Prescription drug management.  Parenteral controlled substances.  Decision regarding hospitalization.    Critical Care  Total time providing critical care: 70 minutes                 ED Medication(s):  Medications   heparin 25,000 units in dextrose 5% 250 mL (100 units/mL) infusion LOW INTENSITY nomogram - [facility] (12 Units/kg/hr × 77.3 kg Intravenous Handoff 5/2/24 0438)   heparin 25,000 units in dextrose 5% (100 units/ml) IV bolus from bag LOW INTENSITY nomogram - [facility] (has no administration in time range)   heparin 25,000 units in dextrose 5% (100 units/ml) IV bolus from bag LOW INTENSITY nomogram - [facility] (has no administration in time range)   aspirin chewable tablet 81 mg (has no administration in time range)   nitroGLYCERIN SL tablet 0.4 mg (has no administration in time range)   glucagon (human recombinant) injection 1 mg (has no administration in time range)   insulin aspart U-100 pen 0-10 Units (has no administration in time range)   dextrose 10% bolus 125 mL 125 mL (has no administration in time range)   dextrose 10% bolus 250 mL 250 mL (has no administration in time range)   amLODIPine tablet 5 mg (has no administration in time range)   ferrous  sulfate tablet 1 each (has no administration in time range)   levothyroxine tablet 50 mcg (has no administration in time range)   insulin detemir U-100 (Levemir) pen 11 Units (has no administration in time range)   metoprolol succinate (TOPROL-XL) 24 hr tablet 50 mg (has no administration in time range)   tamsulosin 24 hr capsule 0.4 mg (has no administration in time range)   aspirin tablet 325 mg (325 mg Oral Given 5/1/24 2318)   morphine injection 4 mg (4 mg Intravenous Given 5/1/24 2345)   ondansetron injection 4 mg (4 mg Intravenous Given 5/1/24 2344)   acetaminophen tablet 650 mg (650 mg Oral Given 5/2/24 0146)   nitroGLYCERIN 2% TD oint ointment 1 inch (1 inch Transdermal Given 5/2/24 0318)   furosemide injection 40 mg (40 mg Intravenous Given 5/2/24 0156)   heparin 25,000 units in dextrose 5% (100 units/ml) IV bolus from bag LOW INTENSITY nomogram - [facility] (3,990 Units Intravenous Bolus from Bag 5/2/24 0343)       Current Discharge Medication List                  Scribe Attestation:   Scribe #1: I performed the above scribed service and the documentation accurately describes the services I performed. I attest to the accuracy of the note.     Attending:   Physician Attestation Statement for Scribe #1: I, Samuel Keating MD, personally performed the services described in this documentation, as scribed by Gal Carroll, in my presence, and it is both accurate and complete.           Clinical Impression       ICD-10-CM ICD-9-CM   1. NSTEMI (non-ST elevated myocardial infarction)  I21.4 410.70   2. Chest pain  R07.9 786.50   3. Acute congestive heart failure, unspecified heart failure type  I50.9 428.0   4. Chronic kidney disease, unspecified CKD stage  N18.9 585.9   5. Non-ST elevation myocardial infarction (NSTEMI)  I21.4 410.70   6. NSTEMI (non-ST elevation myocardial infarction)  I21.4 410.70       Disposition:   Disposition: Admitted  Condition: Samuel Calzada MD  05/02/24 8249

## 2024-05-02 NOTE — PLAN OF CARE
[facility] - Telemetry (Hospital)  Initial Discharge Assessment       Primary Care Provider: Eric Foster MD    Admission Diagnosis: Non-ST elevation myocardial infarction (NSTEMI) [I21.4]  NSTEMI (non-ST elevation myocardial infarction) [I21.4]  NSTEMI (non-ST elevated myocardial infarction) [I21.4]  Chest pain [R07.9]  Chronic kidney disease, unspecified CKD stage [N18.9]  Acute congestive heart failure, unspecified heart failure type [I50.9]    Admission Date: 5/1/2024  Expected Discharge Date: Per Attending    Transition of Care Barriers: None    Payor: [employer] MANAGED MEDICARE / Plan: [employer] MEDICARE PPO / Product Type: Medicare Advantage /     Extended Emergency Contact Information  Primary Emergency Contact: Amira Palacio  Mobile Phone: [phone]  Relation: Spouse  Preferred language: English   needed? No    Discharge Plan A: Home with family       No Pharmacies Listed    Initial Assessment (most recent)       Adult Discharge Assessment - 05/02/24 0937          Discharge Assessment    Assessment Type Discharge Planning Assessment     Confirmed/corrected address, phone number and insurance Yes     Confirmed Demographics Correct on Facesheet     Source of Information family     When was your last doctors appointment? 04/10/24   Frederick Montana MD - Cardiology    Communicated VALDEZ with patient/caregiver Date not available/Unable to determine     Reason For Admission NSTEMI (non-ST elevated myocardial infarction)     People in Home spouse;child(omkar), adult;other relative(s)     Facility Arrived From: home     Do you expect to return to your current living situation? Yes     Do you have help at home or someone to help you manage your care at home? Yes     Who are your caregiver(s) and their phone number(s)? Amira Palacio - spouse     Prior to hospitilization cognitive status: Alert/Oriented     Current cognitive status: Alert/Oriented     Walking or Climbing Stairs Difficulty no     Dressing/Bathing  Difficulty no     Home Accessibility wheelchair accessible     Equipment Currently Used at Home none     Readmission within 30 days? No     Patient currently being followed by outpatient case management? No     Do you currently have service(s) that help you manage your care at home? No     Do you take prescription medications? Yes     Do you have prescription coverage? Yes     Coverage [employer] Managed Medicare     Do you have any problems affording any of your prescribed medications? No     Is the patient taking medications as prescribed? yes     Who is going to help you get home at discharge? Amira Bettencourtrich - spouse     How do you get to doctors appointments? car, drives self;family or friend will provide     Are you on dialysis? No     Do you take coumadin? No     Discharge Plan A Home with family     DME Needed Upon Discharge  none     Discharge Plan discussed with: Patient;Spouse/sig other     Name(s) and Number(s) Amira Palacio     Transition of Care Barriers None                   Anticipated DC dispo: home with family  Prior Level of Function: limited with ADL's, spouse and family assists at home  People in home: Amira Hakeem - spouse, spouse's daughter and spouse's sister    Comments:  SW met with patient and spouse at bedside to introduce role and discuss discharge planning. Patient's spouse will be help at home and can provide transport at time of discharge. Confirmed demographics, insurance, and emergency contacts. SW updated Patient's whiteboard with contact information and anticipated DC plan. CM discharge needs depends on hospital progress. SW will continue following to assist with other needs.

## 2024-05-02 NOTE — ASSESSMENT & PLAN NOTE
Chronic, controlled. Latest blood pressure and vitals reviewed-     Temp:  [97.7 °F (36.5 °C)-98.1 °F (36.7 °C)]   Pulse:  [75-94]   Resp:  [18-21]   BP: (121-157)/(72-98)   SpO2:  [90 %-97 %] .   Home meds for hypertension were reviewed and noted below.   Hypertension Medications               amLODIPine (NORVASC) 10 MG tablet Take 5 mg by mouth once daily.    metoprolol succinate (TOPROL-XL) 50 MG 24 hr tablet Take 50 mg by mouth every evening.            - continue amlodipine and metoprolol  - titrate, as needed

## 2024-05-02 NOTE — SUBJECTIVE & OBJECTIVE
Past Medical History:   Diagnosis Date    CAD (coronary artery disease) of artery bypass graft     Diabetes mellitus     HLD (hyperlipidemia)     Hypertension     Statin intolerance        Past Surgical History:   Procedure Laterality Date    ARTHROPLASTY OF HIP BY ANTERIOR APPROACH Left 11/25/2022    Procedure: ARTHROPLASTY, HIP, TOTAL, ANTERIOR APPROACH;  Surgeon: Félix Ventura MD;  Location: [facility];  Service: Orthopedics;  Laterality: Left;    CORONARY ARTERY BYPASS GRAFT (CABG)         Review of patient's allergies indicates:   Allergen Reactions    Statins-hmg-coa reductase inhibitors Other (See Comments)     Muscle aches  Joint Stiffness       Family History    None       Tobacco Use    Smoking status: Former     Types: Cigarettes     Passive exposure: Past    Smokeless tobacco: Never   Substance and Sexual Activity    Alcohol use: Never    Drug use: Never    Sexual activity: Not on file         Review of Systems   All other systems reviewed and are negative.    Objective:     Vital Signs (Most Recent):  Temp: 97.8 °F (36.6 °C) (05/02/24 1227)  Pulse: 92 (05/02/24 1423)  Resp: 18 (05/02/24 1227)  BP: 136/78 (05/02/24 1227)  SpO2: 97 % (05/02/24 1227) Vital Signs (24h Range):  Temp:  [97.7 °F (36.5 °C)-98.1 °F (36.7 °C)] 97.8 °F (36.6 °C)  Pulse:  [75-94] 92  Resp:  [18-21] 18  SpO2:  [90 %-97 %] 97 %  BP: (121-157)/(72-98) 136/78     Weight: 77.1 kg (170 lb)  Body mass index is 28.29 kg/m².      Intake/Output Summary (Last 24 hours) at 5/2/2024 1800  Last data filed at 5/2/2024 1557  Gross per 24 hour   Intake 625.08 ml   Output 1360 ml   Net -734.92 ml        Physical Exam  Vitals and nursing note reviewed.   Constitutional:       General: He is not in acute distress.  HENT:      Head: Normocephalic and atraumatic.   Eyes:      General: No scleral icterus.     Extraocular Movements: Extraocular movements intact.      Conjunctiva/sclera: Conjunctivae normal.      Pupils: Pupils are equal, round, and  reactive to light.   Cardiovascular:      Rate and Rhythm: Normal rate and regular rhythm.      Pulses: Normal pulses.      Heart sounds: Murmur heard.      Comments: IABP sounds  Pulmonary:      Effort: Pulmonary effort is normal. No respiratory distress.      Breath sounds: Rales (bibasilar) present. No wheezing or rhonchi.   Abdominal:      General: Abdomen is flat. There is no distension.      Palpations: Abdomen is soft.      Tenderness: There is no abdominal tenderness.   Musculoskeletal:      Cervical back: Normal range of motion and neck supple. No rigidity or tenderness.      Right lower leg: Edema present.      Left lower leg: Edema present.      Comments: IABP in place right groin   Skin:     General: Skin is warm and dry.      Coloration: Skin is not jaundiced or pale.   Neurological:      General: No focal deficit present.      Mental Status: He is alert and oriented to person, place, and time. Mental status is at baseline.          Vents:       Lines/Drains/Airways       Line  Duration                  IABP 05/02/24 1700 7.5 Fr. 40 mL <1 day              Peripheral Intravenous Line  Duration                  Peripheral IV - Single Lumen 05/01/24 2329 20 G Right Antecubital <1 day         Peripheral IV - Single Lumen 05/02/24 0315 20 G Left Antecubital <1 day                    Significant Labs:    CBC/Anemia Profile:  Recent Labs   Lab 05/01/24 2318 05/02/24  0322   WBC 8.94 11.71   HGB 14.3 14.7   HCT 44.6 46.0    276   MCV 89 91   RDW 15.0* 14.9*        Chemistries:  Recent Labs   Lab 05/01/24 2318 05/02/24  0949    139   K 4.0 4.2    107   CO2 22* 17*   BUN 23 25*   CREATININE 1.8* 1.7*   CALCIUM 9.4 9.2   ALBUMIN 3.6  --    PROT 7.4  --    BILITOT 0.4  --    ALKPHOS 79  --    ALT 17  --    AST 16  --        All pertinent labs within the past 24 hours have been reviewed.    Significant Imaging:   I have reviewed all pertinent imaging results/findings within the past 24 hours.

## 2024-05-02 NOTE — CARE UPDATE
Mauro Palacio is a 73 year old male with history of CAD, HTN, and Statin intolerance who presented to ED for further evaluation of left should pain radiating to jaw and nausea. Troponin (0.72>1.72>41.6>50) consistent with NSTEMI. Echocardiogram shows regional WMA, reduced systolic function 30-40%, and undetermined diastolic dysfunciton. He is currently on heparin infusion and seen by Cardiology who plans to perform LHC today.     He denies CP and dyspnea. Physical exam unremarkable.     Plan:   NPO  Continue heparin infusion  ASA, BB, hold STATIN as patient is intolerant.

## 2024-05-02 NOTE — BRIEF OP NOTE
INPATIENT Operative Note         SUMMARY     Surgery Date: 5/2/2024     Surgeons and Role:     * Sara Kumar MD - Primary    ASSISTANT:none    Pre-op Diagnosis:  NSTEMI (non-ST elevated myocardial infarction) [I21.4]      Post-op Diagnosis:  NSTEMI (non-ST elevated myocardial infarction) [I21.4]    Procedure(s) (LRB):  CATHETERIZATION, HEART, BOTH LEFT AND RIGHT (N/A)  ANGIOGRAM, ABDOMINAL AORTA  INSERTION, INTRA-AORTIC BALLOON PUMP  PTCA, Single Vessel  INSERTION, STENT, CORONARY ARTERY (N/A)    COMPLICATION:none    Anesthesia: RN IV Sedation    Findings/Key Components:  Occluded lmca   Occluded rca   Patent lima to lad with collaterals to diagonal and ramus and rv branch   Distal lad 40%   Occluded svg to ramus  Svg to pda with collaterals to ramus om  Mid body has ulcerated lesion with thrombus treated with stenting with iabp support due to elevated filling pressures and moderate pulmonary htn.     Estimated Blood Loss: < 50 ML.         SPECIMEN: NONE    Devices/Prostetics: synergy xd and megatron.    PLAN:   Routine post stent care    Check lactate levels.

## 2024-05-02 NOTE — ASSESSMENT & PLAN NOTE
Patient is identified as having  unspecified  heart failure that is Acute. CHF is currently controlled. Latest ECHO performed and demonstrates- No results found for this or any previous visit.  . Continue Beta Blocker and ACE/ARB and monitor clinical status closely. Monitor on telemetry. Patient is off CHF pathway.  Monitor strict Is&Os and daily weights.  Place on fluid restriction of 1.5 L. Cardiology has been consulted. Continue to stress to patient importance of self efficacy and  on diet for CHF. Last BNP reviewed- and noted below   Recent Labs   Lab 05/01/24  2318   *   Plan:  -tele monitoring  -strict I&O  -1.5L/day intake  -echo  -supplemental O2 as needed  -diuretics as needed

## 2024-05-02 NOTE — CONSULTS
Food & Nutrition Education       Diet Education: Cardiac, Carbohydrate  Nutrition Therapy       Nutrition Education provided with handouts:   Heart Healthy Consistent Carbohydrate Nutrition Therapy (nutritioncaremanual.org)     Attached to patient's discharge papers:  Heart Healthy Diet      Comments:   PMH: CAD, T2DM, HTN, Hypothyroid, Acute exacerbation of CHF, CKD, HLD    73 y.o. Male admitted for NSTEMI (non-ST elevated myocardial infarction). Pt currently on Cardiac diet, diet recently advanced, pt was NPO x 1 day. If PO intake < 50%, recommend Su[place]ena BID to assist filling nutritional gaps. Visited pt at bedside, pt was out of the room, provided nutrition education which was left on pt's bedside table. EMR noted that pt is s/p LHC with stents to vein graft and IABP placement, performed today.     Nutrition education handout provided discuses low sodium, general healthful diet r/t recent hospital diagnosis. Handouts recommends a well balanced diet with a variety of fresh foods, fruits and vegetables (5 cups/day), whole grains (3 oz/day), and fat-free or low fat dairy, reading food packages, food labels, and nutrition facts labels to identify nutrient content of foods, discusses the importance of limiting sodium to less than 2,000 mg per day, recommends salt free seasonings and other herbs and spices in meals to enhance flavor without additional sodium, dietary sources of cholesterol, the importance of incorporating healthy fats into the diet, and avoiding saturated and trans fats for heart health. For a generally healthy diet, aim for total fat less than 25-35% of calories.  Also includes the importance of fiber (especially soluble fiber), dietary sources, and a goal intake of >20-30g/day and the importance of carbohydrates in the diet, but with diabetes, focusing on consistent carb intake throughout the day with emphasis on protein and fiber. For a 2,000 calorie diet, aim for 225-275g (45-55%) daily CHO (goal:  3-4 servings (45-60 g) of carbs per meal with snacks in between).     Nutrition education handout includes RD contact information, please encourage pt to use if he has any questions/concerns he may have before RD discusses with pt at RD follow up.     Nutrition Related Social Determinants of Health: SDOH: Unable to assess at this time.         Provided handout with dietitian's contact information.   *Please re-consult as needed.   Thank you,   Maria Raymond, Registration Eligible, Provisional LDN

## 2024-05-02 NOTE — ASSESSMENT & PLAN NOTE
Patient is identified as having Systolic (HFrEF) heart failure that is Acute. CHF is currently uncontrolled due to Rales/crackles on pulmonary exam. Latest ECHO performed and demonstrates- Results for orders placed during the hospital encounter of 05/01/24    Echo    Interpretation Summary    Left Ventricle: The left ventricle is normal in size. Normal wall thickness. Regional wall motion abnormalities present. See diagram for wall motion findings. Septal motion is consistent with bundle branch block. There is moderately reduced systolic function with a visually estimated ejection fraction of 30 - 40%. Ejection fraction by visual approximation is 30%. Biplane (2D) method of discs ejection fraction is 26%. There is diastolic dysfunction but grade cannot be determined.    Right Ventricle: Normal right ventricular cavity size. Wall thickness is normal. Systolic function is normal.    Left Atrium: Left atrium is moderately dilated.    Mitral Valve: There is moderate regurgitation.    Pulmonary Artery: The estimated pulmonary artery systolic pressure is 31 mmHg.    IVC/SVC: Intermediate venous pressure at 8 mmHg.  . Continue Beta Blocker and monitor clinical status closely. Monitor on telemetry. Patient is off CHF pathway.  Monitor strict Is&Os and daily weights.  Place on fluid restriction of 1.5 L. Cardiology has been consulted. Continue to stress to patient importance of self efficacy and  on diet for CHF. Last BNP reviewed- and noted below   Recent Labs   Lab 05/01/24  2318   *     - given 40mg IV Lasix in the cath lab  --- may redose overnight  - IABP in place 5/2  - Cardiology following

## 2024-05-02 NOTE — PLAN OF CARE
[facility] Pool Room/NONE.  Nahum Palacio is a 73 y.o.male admitted on 5/1/2024 for NSTEMI (non-ST elevated myocardial infarction)   Code Status: Full Code MRN: [id number]   Review of patient's allergies indicates:   Allergen Reactions    Statins-hmg-coa reductase inhibitors Other (See Comments)     Muscle aches  Joint Stiffness     Past Medical History:   Diagnosis Date    CAD (coronary artery disease) of artery bypass graft     Diabetes mellitus     HLD (hyperlipidemia)     Hypertension     Statin intolerance       PRN meds    ceFAZolin, , PRN  dextrose 10%, 12.5 g, PRN  dextrose 10%, 25 g, PRN  diphenhydrAMINE, , PRN  fentaNYL, , PRN  furosemide, , PRN  glucagon (human recombinant), 1 mg, PRN  heparin (PORCINE), 51.7 Units/kg, PRN  heparin (PORCINE), 30 Units/kg, PRN  heparin, porcine (PF), , PRN  insulin aspart U-100, 0-10 Units, Q6H PRN  LIDOcaine (PF) 20 mg/mL (2%), , PRN  midazolam, , PRN  nitroGLYCERIN, , PRN  nitroGLYCERIN, 0.4 mg, Q5 Min PRN  ticagrelor, , PRN  tirofiban-0.9% sodium chloride 12.5 mg/250ml, , PRN  tirofiban-0.9% sodium chloride 12.5 mg/250ml, , Continuous PRN  verapamiL, , PRN      Chart check completed. Will continue plan of care.      Orientation: oriented x 4  [place] Coma Scale Score: 15     Lead Monitored: Lead II Rhythm: normal sinus rhythm    Cardiac/Telemetry Box Number: [id number]  VTE Required Core Measure: Pharmacological prophylaxis initiated/maintained Last Bowel Movement: 05/01/24  Diet NPO Except for: Medication  Voiding Characteristics: voids spontaneously without difficulty  Nguyễn Score: 21  Fall Risk Score: 11  Accucheck [x]        Lines/Drains/Airways       Peripheral Intravenous Line  Duration                  Peripheral IV - Single Lumen 05/01/24 2329 20 G Right Antecubital <1 day         Peripheral IV - Single Lumen 05/02/24 0315 20 G Left Antecubital <1 day                    Problem: Acute Coronary Syndrome  Goal: Optimal Adaptation to Illness  Outcome: Progressing  Goal:  Absence of Cardiac-Related Pain  Outcome: Progressing  Goal: Normalized Cardiac Rhythm  Outcome: Progressing  Goal: Effective Cardiac Pump Function  Outcome: Progressing  Goal: Adequate Tissue Perfusion  Outcome: Progressing

## 2024-05-02 NOTE — CONSULTS
[facility] - Telemetry ([facility])  Cardiology  Consult Note    Patient Name: Nahum Palacio  MRN: [id number]  Admission Date: 5/1/2024  Hospital Length of Stay: 0 days  Code Status: Full Code   Attending Provider: Chan Jimenes MD   Consulting Provider: Christelle Gonzalez PA-C  Primary Care Physician: Eric Foster MD  Principal Problem:NSTEMI (non-ST elevated myocardial infarction)    Patient information was obtained from patient, past medical records, and ER records.     Inpatient consult to Cardiology  Consult performed by: Christelle Gonzalez PA-C  Consult ordered by: Kwame Brown NP        Subjective:     Chief Complaint:  NSTEMI    HPI:   Mr. Palacio is a 73 year old male patient whose current medical conditions include CAD s/p remote CABG in 2006 (LIMA-LAD, SVG-Ramus, SVG-RCA), hyperlipidemia, HTN, and statin intolerance who presented to [facility] ED overnight due to persistent heavy left shoulder pain. Associated symptoms included jaw discomfort, nausea, and pain near his left pectoral muscle. He denied any associated vomiting, diaphoresis, palpitations, near syncope, or syncope. Reported he had been dealing with left shoulder pain for quite some time due to prior fall and thought it was due to MSK/arthritic pain but became concerned when pain persisted despite warm compresses/hot shower. Initial workup in ED revealed troponin of 0.072>0.1759 and BNP of 978. Patient subsequently admitted for further evaluation and treatment. Cardiology consulted to assist with management. Patient seen and examined today, resting in bed. Feeling better. Denies any left shoulder pain/CP during exam. No other CV complaints. He reports compliance with his medications. Followed on OP basis by Dr. Montana. Troponin bumped up to 41. EKG reviewed, shows ischemic changes/QRS widening in multiple leads. Preliminary bedside read of TTE by MD revealed depressed EF. Plans for R/LHC today pending repeat BMP.     Past Medical  History:   Diagnosis Date    CAD (coronary artery disease) of artery bypass graft     Diabetes mellitus     HLD (hyperlipidemia)     Hypertension     Statin intolerance        Past Surgical History:   Procedure Laterality Date    ARTHROPLASTY OF HIP BY ANTERIOR APPROACH Left 11/25/2022    Procedure: ARTHROPLASTY, HIP, TOTAL, ANTERIOR APPROACH;  Surgeon: Félix Ventura MD;  Location: [facility];  Service: Orthopedics;  Laterality: Left;    CORONARY ARTERY BYPASS GRAFT (CABG)         Review of patient's allergies indicates:   Allergen Reactions    Statins-hmg-coa reductase inhibitors Other (See Comments)     Muscle aches  Joint Stiffness       Current Facility-Administered Medications   Medication Dose Route Frequency Provider Last Rate Last Admin    0.9%  NaCl infusion   Intravenous Continuous Christelle Gonzalez PA-C 75 mL/hr at 05/02/24 0835 New Bag at 05/02/24 0835    amLODIPine tablet 5 mg  5 mg Oral Daily Kwame Brown NP   5 mg at 05/02/24 0817    [START ON 5/3/2024] aspirin chewable tablet 81 mg  81 mg Oral Daily Kwame Brown NP        dextrose 10% bolus 125 mL 125 mL  12.5 g Intravenous PRN Kwame Brown NP        dextrose 10% bolus 250 mL 250 mL  25 g Intravenous PRN Kwame Brown NP        ferrous sulfate tablet 1 each  1 tablet Oral Daily Kwame Brown NP   1 each at 05/02/24 0817    glucagon (human recombinant) injection 1 mg  1 mg Intramuscular PRN Kwame Brown NP        heparin 25,000 units in dextrose 5% (100 units/ml) IV bolus from bag LOW INTENSITY nomogram - [facility]  51.7 Units/kg Intravenous PRN Samuel Keating MD        heparin 25,000 units in dextrose 5% (100 units/ml) IV bolus from bag LOW INTENSITY nomogram - [facility]  30 Units/kg Intravenous PRN Samuel Keating MD        heparin 25,000 units in dextrose 5% 250 mL (100 units/mL) infusion LOW INTENSITY nomogram - [facility]  0-40 Units/kg/hr Intravenous Continuous Samuel Keating MD 9.3 mL/hr at 05/02/24 0341 12 Units/kg/hr at  05/02/24 0341    insulin aspart U-100 pen 0-10 Units  0-10 Units Subcutaneous Q6H PRN Kwame Brown NP   4 Units at 05/02/24 0626    insulin detemir U-100 (Levemir) pen 11 Units  11 Units Subcutaneous Daily Kwame Brown NP   11 Units at 05/02/24 0818    levothyroxine tablet 50 mcg  50 mcg Oral Before breakfast Kwame Brown NP   50 mcg at 05/02/24 0622    metoprolol succinate (TOPROL-XL) 24 hr tablet 50 mg  50 mg Oral QHS Kwame Brown NP        nitroGLYCERIN SL tablet 0.4 mg  0.4 mg Sublingual Q5 Min PRN Kwame Brown NP        tamsulosin 24 hr capsule 0.4 mg  0.4 mg Oral Daily Kwame Brown NP   0.4 mg at 05/02/24 0817     Family History    None       Tobacco Use    Smoking status: Former     Types: Cigarettes     Passive exposure: Past    Smokeless tobacco: Never   Substance and Sexual Activity    Alcohol use: Never    Drug use: Never    Sexual activity: Not on file     Review of Systems   Constitutional: Positive for malaise/fatigue.   HENT:          Jaw pain   Eyes: Negative.    Cardiovascular: Negative.    Respiratory: Negative.     Endocrine: Negative.    Hematologic/Lymphatic: Negative.    Skin: Negative.    Musculoskeletal:  Positive for arthritis and joint pain (L shoulder).   Gastrointestinal:  Positive for nausea.   Genitourinary: Negative.    Neurological:  Positive for weakness.   Psychiatric/Behavioral: Negative.     Allergic/Immunologic: Negative.      Objective:     Vital Signs (Most Recent):  Temp: 97.7 °F (36.5 °C) (05/02/24 0810)  Pulse: 81 (05/02/24 0810)  Resp: 18 (05/02/24 0810)  BP: (!) 144/82 (05/02/24 0810)  SpO2: 95 % (05/02/24 0810) Vital Signs (24h Range):  Temp:  [97.7 °F (36.5 °C)-98.1 °F (36.7 °C)] 97.7 °F (36.5 °C)  Pulse:  [81-94] 81  Resp:  [18-21] 18  SpO2:  [90 %-97 %] 95 %  BP: (121-157)/(72-98) 144/82     Weight: 77.1 kg (170 lb)  Body mass index is 28.29 kg/m².    SpO2: 95 %         Intake/Output Summary (Last 24 hours) at 5/2/2024  1034  Last data filed at 5/2/2024 0815  Gross per 24 hour   Intake 0 ml   Output 900 ml   Net -900 ml       Lines/Drains/Airways       Peripheral Intravenous Line  Duration                  Peripheral IV - Single Lumen 05/01/24 2329 20 G Right Antecubital <1 day         Peripheral IV - Single Lumen 05/02/24 0315 20 G Left Antecubital <1 day                     Physical Exam  Vitals and nursing note reviewed.   Constitutional:       General: He is not in acute distress.     Appearance: He is well-developed. He is ill-appearing. He is not diaphoretic.      Comments: On supplemental O2 via NC   HENT:      Head: Normocephalic and atraumatic.   Eyes:      General:         Right eye: No discharge.         Left eye: No discharge.      Pupils: Pupils are equal, round, and reactive to light.   Cardiovascular:      Rate and Rhythm: Normal rate and regular rhythm.      Heart sounds: Normal heart sounds, S1 normal and S2 normal. No murmur heard.  Pulmonary:      Effort: Pulmonary effort is normal. No respiratory distress.      Breath sounds: Rales present. No wheezing.   Abdominal:      General: There is no distension.   Musculoskeletal:      Right lower leg: Edema present.      Left lower leg: Edema present.   Skin:     General: Skin is dry.      Findings: No erythema.      Comments: BLE cool   Neurological:      Mental Status: He is alert and oriented to person, place, and time.          Significant Labs: CMP   Recent Labs   Lab 05/01/24  2318      K 4.0      CO2 22*   *   BUN 23   CREATININE 1.8*   CALCIUM 9.4   PROT 7.4   ALBUMIN 3.6   BILITOT 0.4   ALKPHOS 79   AST 16   ALT 17   ANIONGAP 11   , CBC   Recent Labs   Lab 05/01/24  2318 05/02/24  0322   WBC 8.94 11.71   HGB 14.3 14.7   HCT 44.6 46.0    276   , Troponin   Recent Labs   Lab 05/02/24  0222 05/02/24  0614 05/02/24  0949   TROPONINI 1.759* 41.611* >50.000*   , and All pertinent lab results from the last 24 hours have been  reviewed.    Significant Imaging: Echocardiogram: Transthoracic echo (TTE) complete (Cupid Only):   Results for orders placed or performed during the hospital encounter of 05/01/24   Echo   Result Value Ref Range    BSA 1.88 m2    Pineda's Biplane MOD Ejection Fraction 26 %    LVOT stroke volume 47.18 cm3    LVIDd 5.64 3.5 - 6.0 cm    LV Systolic Volume 110.97 mL    LV Systolic Volume Index 60.0 mL/m2    LVIDs 4.87 (A) 2.1 - 4.0 cm    LV Diastolic Volume 156.29 mL    LV Diastolic Volume Index 84.48 mL/m2    IVS 0.95 0.6 - 1.1 cm    LVOT diameter 2.05 cm    LVOT area 3.3 cm2    FS 14 (A) 28 - 44 %    Left Ventricle Relative Wall Thickness 0.25 cm    Posterior Wall 0.71 0.6 - 1.1 cm    LV mass 175.00 g    LV Mass Index 95 g/m2    MV Peak E Chandra 0.50 m/s    TDI SEPTAL 0.04 m/s    MV Peak A Chandra 0.61 m/s    TR Max Chandra 2.38 m/s    E/A ratio 0.82     IVRT 110.37 msec    E wave deceleration time 146.15 msec    LV SEPTAL E/E' RATIO 12.50 m/s    LVOT peak chandra 0.72 m/s    Left Ventricular Outflow Tract Mean Velocity 0.51 cm/s    Left Ventricular Outflow Tract Mean Gradient 1.19 mmHg    RVDD 3.43 cm    RVOT peak VTI 12.2 cm    TAPSE 1.34 cm    RV/LV Ratio 0.61 cm    LA size 4.33 cm    Left Atrium Minor Axis 4.94 cm    Left Atrium Major Axis 5.34 cm    RA Major Axis 4.26 cm    Vn Nyquist MS 0.33 m/s    AV mean gradient 2 mmHg    AV peak gradient 4 mmHg    Ao peak chandra 1.05 m/s    Ao VTI 20.30 cm    LVOT peak VTI 14.30 cm    AV valve area 2.32 cm²    AV Velocity Ratio 0.69     AV index (prosthetic) 0.70     ANTWAN by Velocity Ratio 2.26 cm²    Radius 0.65 cm    Vn Nyquist 0.33 m/s    Mr max chandra 5.48 m/s    MR PISA EROA 0.16 cm2    MV stenosis pressure 1/2 time 42.38 ms    MV valve area p 1/2 method 5.19 cm2    Triscuspid Valve Regurgitation Peak Gradient 23 mmHg    PV mean gradient 1 mmHg    PV PEAK VELOCITY 0.79 m/s    PV peak gradient 2 mmHg    RVOT peak chandra 0.65 m/s    Ao root annulus 3.11 cm    STJ 2.91 cm    Ascending aorta 3.13  cm    IVC diameter 1.92 cm    ZLVIDS 3.39     ZLVIDD 0.93     LA Volume Index 43.9 mL/m2    LA volume 81.22 cm3    LA WIDTH 4.3 cm    RA Width 3.5 cm    and EKG: Reviewed  Assessment and Plan:   Patient who presents with NSTEMI/acute CHF/ICM. Stable this AM. Continue OMT. Gently diurese. R/LHC planned today pending creatinine trend.     * NSTEMI (non-ST elevated myocardial infarction)  -Patient with history of CAD s/p remote CABG who presents with L shoulder pain/CP and elevated troponin---consistent with NSTEMI  -Troponin 0.072>1.759>41.611, repeat pending  -EKG reviewed, ischemic changes with QRS widening in multiple leads  -Continue ASA, heparin gtt, CCB, BB  -Patient intolerant to statin  -R/LHC planned today pending creatinine trend    CKD (chronic kidney disease)  -Creatinine 1.8, repeat pending    Acute exacerbation of CHF (congestive heart failure)  -BNP elevated and CXR with congestion  -Gently diurese  -Preliminary review of TTE with depressed EF, official report to follow  -Continue BB  -Will further optimize regimen as tolerated  -R/LHC tentatively planned today    Hypertension  -Continue CCB, BB  -Will optimize meds as tolerated    Diabetes mellitus  -Mgmt as per hospital medicine    Coronary artery disease  -See plan under NSTEMI        VTE Risk Mitigation (From admission, onward)           Ordered     heparin 25,000 units in dextrose 5% (100 units/ml) IV bolus from bag LOW INTENSITY nomogram - [facility]  As needed (PRN)        Question:  Heparin Infusion Adjustment (DO NOT MODIFY ANSWER)  Answer:  \\[email or web address]\Images\Pharmacy\HeparinInfusions\heparin LOW INTENSITY nomogram for [facility] [id number]    05/02/24 0314     heparin 25,000 units in dextrose 5% (100 units/ml) IV bolus from bag LOW INTENSITY nomogram - [facility]  As needed (PRN)        Question:  Heparin Infusion Adjustment (DO NOT MODIFY ANSWER)  Answer:  \\[email or web address]\Images\Pharmacy\HeparinInfusions\heparin LOW INTENSITY nomogram for [facility]  [id number]    05/02/24 0314     IP VTE HIGH RISK PATIENT  Once         05/02/24 0324     Place sequential compression device  Until discontinued         05/02/24 0324     heparin 25,000 units in dextrose 5% 250 mL (100 units/mL) infusion LOW INTENSITY nomogram - [facility]  Continuous        Question:  Begin at (units/kg/hr)  Answer:  12    05/02/24 0314                    Thank you for your consult. I will follow-up with patient. Please contact us if you have any additional questions.    Christelle Gonzalez PA-C  Cardiology   [facility] - Telemetry ([facility])

## 2024-05-02 NOTE — H&P
[facility] Medicine  History & Physical    Patient Name: Nahum Palacio  MRN: [id number]  Patient Class: IP- Inpatient  Admission Date: 5/1/2024  Attending Physician: Sanchez Brown MD   Primary Care Provider: Eric Foster MD         Patient information was obtained from patient, spouse/SO, past medical records, and ER records.     Subjective:     Principal Problem:NSTEMI (non-ST elevated myocardial infarction)    Chief Complaint:   Chief Complaint   Patient presents with    Chest Pain     Intermittent sharp, severe, left sided CP since November. Tonight unrelieved and radiating to left jaw. States was seen recently and told it was muscular needing shoulder surgery and not cardiac in nature. has hx of triple bypass 17 years ago. SOB, pain on inspiration with non productive cough x 1 year        HPI: Nahum Palacio is a 73 y.o. male with a PMH  has a past medical history of CAD (coronary artery disease) of artery bypass graft, Diabetes mellitus, HLD (hyperlipidemia), Hypertension, and Statin intolerance.  Presented to the ER for evaluation of left-sided chest pain that gradually worsened at 6:00 p.m. last night.  Patient reports that he has been suffering from left-sided shoulder pain that radiates into his left out of pectoral muscle for the last several months.  Patient states that his pain/discomfort usually resolves with warm compresses in the hot shower.  Patient states he recently had a steroid injection in January from his orthopedic provider.  However, patient states that when over-the-counter medicine and hot shower did not relieve his symptoms he presented to the ER for further evaluation.  Patient reports he has a history of coronary bypass surgery roughly 17 years ago and states that he presented with atypical symptoms at that time.  Patient denies any substernal chest pain, palpitations, shortness of breath, but does report chronic bilateral lower extremity  edema.  Denies any history of CHF.  Patient is followed by Dr. Frederick mancuso for Cardiology.  Recently evaluated by him on 04/10/2024 and had lisinopril changed to losartan due to side effect of cough.  Denies any other symptoms at this time.    ER workup revealed BUN/creatinine of 23/1.8 (baseline creatinine of 1.6) CBG of 285 mg/dL, BNP of 978, initial troponin 0.072 with delta troponin of 1.759.  EKG revealed normal sinus rhythm with a ventricular rate of 83 beats per minute with a QT/QTC of 414/46.  Chest x-ray wet read shows pulmonary congestion.  Hospital Medicine consulted to admit patient for NSTEMI.  Patient initiated on heparin drip.  Patient and wife at bedside are in agreement with treatment plan.  Patient admitted under inpatient status.    PCP: Eric Foster    Past Medical History:   Diagnosis Date    CAD (coronary artery disease) of artery bypass graft     Diabetes mellitus     HLD (hyperlipidemia)     Hypertension     Statin intolerance        Past Surgical History:   Procedure Laterality Date    ARTHROPLASTY OF HIP BY ANTERIOR APPROACH Left 11/25/2022    Procedure: ARTHROPLASTY, HIP, TOTAL, ANTERIOR APPROACH;  Surgeon: Félix Ventura MD;  Location: [facility];  Service: Orthopedics;  Laterality: Left;    CORONARY ARTERY BYPASS GRAFT (CABG)         Review of patient's allergies indicates:   Allergen Reactions    Statins-hmg-coa reductase inhibitors Other (See Comments)     Muscle aches  Joint Stiffness       Current Facility-Administered Medications   Medication Dose Route Frequency Provider Last Rate Last Admin    amLODIPine tablet 5 mg  5 mg Oral Daily Kwame Brown NP        [START ON 5/3/2024] aspirin chewable tablet 81 mg  81 mg Oral Daily Kwame Brown NP        dextrose 10% bolus 125 mL 125 mL  12.5 g Intravenous PRN Kwame Brown NP        dextrose 10% bolus 250 mL 250 mL  25 g Intravenous PRN Kwame Brown NP        ferrous sulfate tablet 1 each  1 tablet Oral Daily  Kwame Brown NP        glucagon (human recombinant) injection 1 mg  1 mg Intramuscular PRN Kwame Brown NP        heparin 25,000 units in dextrose 5% (100 units/ml) IV bolus from bag LOW INTENSITY nomogram - [facility]  51.7 Units/kg Intravenous PRN Samuel Keating MD        heparin 25,000 units in dextrose 5% (100 units/ml) IV bolus from bag LOW INTENSITY nomogram - [facility]  30 Units/kg Intravenous PRN Samuel Keating MD        heparin 25,000 units in dextrose 5% 250 mL (100 units/mL) infusion LOW INTENSITY nomogram - [facility]  0-40 Units/kg/hr Intravenous Continuous Samuel Keating MD 9.3 mL/hr at 05/02/24 0341 12 Units/kg/hr at 05/02/24 0341    insulin aspart U-100 pen 0-10 Units  0-10 Units Subcutaneous Q6H PRN Kwame Brown NP        insulin detemir U-100 (Levemir) pen 11 Units  11 Units Subcutaneous Daily Kwame Brown NP        levothyroxine tablet 50 mcg  50 mcg Oral Before breakfast Kwame Brown NP        metoprolol succinate (TOPROL-XL) 24 hr tablet 50 mg  50 mg Oral QHS Kwame Brown NP        nitroGLYCERIN SL tablet 0.4 mg  0.4 mg Sublingual Q5 Min PRN Kwame Brown NP        tamsulosin 24 hr capsule 0.4 mg  0.4 mg Oral Daily Kwame Brown NP         Family History    None       Tobacco Use    Smoking status: Former     Types: Cigarettes     Passive exposure: Past    Smokeless tobacco: Never   Substance and Sexual Activity    Alcohol use: Never    Drug use: Never    Sexual activity: Not on file     Review of Systems   Respiratory:  Positive for cough (Chronic). Negative for shortness of breath.    Cardiovascular:  Positive for chest pain and leg swelling. Negative for palpitations.   Musculoskeletal:  Positive for arthralgias and myalgias.   All other systems reviewed and are negative.    Objective:     Vital Signs (Most Recent):  Temp: 97.9 °F (36.6 °C) (05/02/24 0445)  Pulse: 87 (05/02/24 0445)  Resp: 18 (05/02/24 0445)  BP: (!) 146/85 (05/02/24 0445)  SpO2: (!) 94 %  (24 7777) Vital Signs (24h Range):  Temp:  [97.9 °F (36.6 °C)-98.1 °F (36.7 °C)] 97.9 °F (36.6 °C)  Pulse:  [83-94] 87  Resp:  [18-21] 18  SpO2:  [90 %-97 %] 94 %  BP: (121-157)/(72-98) 146/85     Weight: 80.7 kg (177 lb 14.6 oz)  Body mass index is 29.61 kg/m².     Physical Exam  Vitals and nursing note reviewed.   Constitutional:       General: He is awake. He is not in acute distress.     Appearance: Normal appearance. He is well-developed and well-groomed. He is not ill-appearing, toxic-appearing or diaphoretic.   HENT:      Head: Normocephalic and atraumatic.      Mouth/Throat:      Mouth: Mucous membranes are moist.      Pharynx: Oropharynx is clear.   Eyes:      Extraocular Movements: Extraocular movements intact.      Conjunctiva/sclera: Conjunctivae normal.   Cardiovascular:      Rate and Rhythm: Normal rate and regular rhythm.      Pulses: Normal pulses.      Heart sounds: Normal heart sounds. No murmur heard.  Pulmonary:      Effort: Pulmonary effort is normal.      Breath sounds: Decreased breath sounds present. No wheezing, rhonchi or rales.   Abdominal:      General: Bowel sounds are normal.      Palpations: Abdomen is soft.      Tenderness: There is no abdominal tenderness.   Musculoskeletal:      Cervical back: Normal range of motion and neck supple.      Right lower le+ Pitting Edema present.      Left lower le+ Pitting Edema present.      Comments: 5/5 strength throughout   Skin:     General: Skin is warm and dry.      Capillary Refill: Capillary refill takes less than 2 seconds.   Neurological:      General: No focal deficit present.      Mental Status: He is alert and oriented to person, place, and time. Mental status is at baseline.      GCS: GCS eye subscore is 4. GCS verbal subscore is 5. GCS motor subscore is 6.      Cranial Nerves: Cranial nerves 2-12 are intact.      Sensory: Sensation is intact.   Psychiatric:         Mood and Affect: Mood normal.         Behavior: Behavior  normal. Behavior is cooperative.              LABS:  Recent Results (from the past 24 hour(s))   CBC auto differential    Collection Time: 05/01/24 11:18 PM   Result Value Ref Range    WBC 8.94 3.90 - 12.70 K/uL    RBC 4.99 4.60 - 6.20 M/uL    Hemoglobin 14.3 14.0 - 18.0 g/dL    Hematocrit 44.6 40.0 - 54.0 %    MCV 89 82 - 98 fL    MCH 28.7 27.0 - 31.0 pg    MCHC 32.1 32.0 - 36.0 g/dL    RDW 15.0 (H) 11.5 - 14.5 %    Platelets 275 150 - 450 K/uL    MPV 9.9 9.2 - 12.9 fL    Immature Granulocytes 0.9 (H) 0.0 - 0.5 %    Gran # (ANC) 6.7 1.8 - 7.7 K/uL    Immature Grans (Abs) 0.08 (H) 0.00 - 0.04 K/uL    Lymph # 1.3 1.0 - 4.8 K/uL    Mono # 0.6 0.3 - 1.0 K/uL    Eos # 0.2 0.0 - 0.5 K/uL    Baso # 0.09 0.00 - 0.20 K/uL    nRBC 0 0 /100 WBC    Gran % 74.7 (H) 38.0 - 73.0 %    Lymph % 14.5 (L) 18.0 - 48.0 %    Mono % 6.8 4.0 - 15.0 %    Eosinophil % 2.1 0.0 - 8.0 %    Basophil % 1.0 0.0 - 1.9 %    Differential Method Automated    Comprehensive metabolic panel    Collection Time: 05/01/24 11:18 PM   Result Value Ref Range    Sodium 141 136 - 145 mmol/L    Potassium 4.0 3.5 - 5.1 mmol/L    Chloride 108 95 - 110 mmol/L    CO2 22 (L) 23 - 29 mmol/L    Glucose 285 (H) 70 - 110 mg/dL    BUN 23 8 - 23 mg/dL    Creatinine 1.8 (H) 0.5 - 1.4 mg/dL    Calcium 9.4 8.7 - 10.5 mg/dL    Total Protein 7.4 6.0 - 8.4 g/dL    Albumin 3.6 3.5 - 5.2 g/dL    Total Bilirubin 0.4 0.1 - 1.0 mg/dL    Alkaline Phosphatase 79 55 - 135 U/L    AST 16 10 - 40 U/L    ALT 17 10 - 44 U/L    eGFR 39 (A) >60 mL/min/1.73 m^2    Anion Gap 11 8 - 16 mmol/L   Troponin I #1    Collection Time: 05/01/24 11:18 PM   Result Value Ref Range    Troponin I 0.072 (H) 0.000 - 0.026 ng/mL   BNP    Collection Time: 05/01/24 11:18 PM   Result Value Ref Range     (H) 0 - 99 pg/mL   Troponin I #2    Collection Time: 05/02/24  2:22 AM   Result Value Ref Range    Troponin I 1.759 (H) 0.000 - 0.026 ng/mL   APTT    Collection Time: 05/02/24  3:22 AM   Result Value Ref Range     aPTT 27.4 21.0 - 32.0 sec   Protime-INR    Collection Time: 05/02/24  3:22 AM   Result Value Ref Range    Prothrombin Time 11.4 9.0 - 12.5 sec    INR 1.0 0.8 - 1.2   CBC auto differential    Collection Time: 05/02/24  3:22 AM   Result Value Ref Range    WBC 11.71 3.90 - 12.70 K/uL    RBC 5.08 4.60 - 6.20 M/uL    Hemoglobin 14.7 14.0 - 18.0 g/dL    Hematocrit 46.0 40.0 - 54.0 %    MCV 91 82 - 98 fL    MCH 28.9 27.0 - 31.0 pg    MCHC 32.0 32.0 - 36.0 g/dL    RDW 14.9 (H) 11.5 - 14.5 %    Platelets 276 150 - 450 K/uL    MPV 9.7 9.2 - 12.9 fL    Immature Granulocytes 0.9 (H) 0.0 - 0.5 %    Gran # (ANC) 10.4 (H) 1.8 - 7.7 K/uL    Immature Grans (Abs) 0.11 (H) 0.00 - 0.04 K/uL    Lymph # 0.7 (L) 1.0 - 4.8 K/uL    Mono # 0.4 0.3 - 1.0 K/uL    Eos # 0.0 0.0 - 0.5 K/uL    Baso # 0.08 0.00 - 0.20 K/uL    nRBC 0 0 /100 WBC    Gran % 88.9 (H) 38.0 - 73.0 %    Lymph % 6.0 (L) 18.0 - 48.0 %    Mono % 3.5 (L) 4.0 - 15.0 %    Eosinophil % 0.0 0.0 - 8.0 %    Basophil % 0.7 0.0 - 1.9 %    Differential Method Automated    POCT glucose    Collection Time: 05/02/24  3:52 AM   Result Value Ref Range    POCT Glucose 268 (H) 70 - 110 mg/dL       RADIOLOGY  No results found.    EKG    MICROBIOLOGY    MDM     Amount and/or Complexity of Data Reviewed  Clinical lab tests: reviewed  Tests in the radiology section of CPT®: reviewed  Tests in the medicine section of CPT®: reviewed  Discussion of test results with the performing providers: yes  Decide to obtain previous medical records or to obtain history from someone other than the patient: yes  Obtain history from someone other than the patient: yes  Review and summarize past medical records: yes  Discuss the patient with other providers: yes  Independent visualization of images, tracings, or specimens: yes        Assessment/Plan:     * NSTEMI (non-ST elevated myocardial infarction)  Patient presents with NSTEMI. Chest pain is currently controlled. SANKET score is 6. Patient is currently on  NSTEMI Pathway.    EKG reviewed. Troponins reviewed and results noted-   Recent Labs   Lab 05/02/24  0222   TROPONINI 1.759*   .     Lipid panel reviewed and shows-     Lab Results   Component Value Date    LDLCALC 213 (H) 05/06/2021     Lab Results   Component Value Date    TRIG 142 05/06/2021         Medical management includes; Beta Blocker, Anticoagulation, ACE/ARB, and Nitrate Echo has not been performed. Latest ECHO results are as follows- No results found for this or any previous visit.  .   Consult for cardiac rehab is not ordered. Patient counseled on lifestyle modifications- begin progressive daily aerobic exercise program, follow a low fat, low cholesterol diet, reduce salt in diet and cooking, reduce exposure to stress, improve dietary compliance, continue current medications, continue current healthy lifestyle patterns, and return for routine annual checkups. Cardiology is consulted. Plan of care not reviewed with cardiology team. Continue to monitor patient closely and adjust therapy as needed.        Acute exacerbation of CHF (congestive heart failure)  Patient is identified as having  unspecified  heart failure that is Acute. CHF is currently controlled. Latest ECHO performed and demonstrates- No results found for this or any previous visit.  . Continue Beta Blocker and ACE/ARB and monitor clinical status closely. Monitor on telemetry. Patient is off CHF pathway.  Monitor strict Is&Os and daily weights.  Place on fluid restriction of 1.5 L. Cardiology has been consulted. Continue to stress to patient importance of self efficacy and  on diet for CHF. Last BNP reviewed- and noted below   Recent Labs   Lab 05/01/24  2318   *   Plan:  -tele monitoring  -strict I&O  -1.5L/day intake  -echo  -supplemental O2 as needed  -diuretics as needed      Coronary artery disease  Patient with known CAD s/p CABG, which is controlled Will continue ASA and monitor for S/Sx of angina/ACS. Continue to  monitor on telemetry.     Diabetes mellitus  Patient's FSGs are uncontrolled due to hyperglycemia on current medication regimen.  Last A1c reviewed-   Lab Results   Component Value Date    HGBA1C 6.3 (H) 03/25/2024     Most recent fingerstick glucose reviewed-   Recent Labs   Lab 05/02/24  0352   POCTGLUCOSE 268*     Current correctional scale  Low  Maintain anti-hyperglycemic dose as follows-   Antihyperglycemics (From admission, onward)      Start     Stop Route Frequency Ordered    05/02/24 0900  insulin detemir U-100 (Levemir) pen 11 Units         -- SubQ Daily 05/02/24 0435    05/02/24 0426  insulin aspart U-100 pen 0-10 Units         -- SubQ Every 6 hours PRN 05/02/24 0326        Plan:  Hold Oral hypoglycemics while patient is in the hospital.  [facility]  Accuchecks  Hyperglycemia/Hypoglycemia protocol  25-50% reduction in LA insulin, titrate as needed    Hypertension  Chronic, controlled. Latest blood pressure and vitals reviewed-     Temp:  [97.9 °F (36.6 °C)-98.1 °F (36.7 °C)]   Pulse:  [83-94]   Resp:  [18-21]   BP: (121-157)/(72-98)   SpO2:  [90 %-97 %] .   Home meds for hypertension were reviewed and noted below.   Hypertension Medications               amLODIPine (NORVASC) 10 MG tablet Take 5 mg by mouth once daily.    metoprolol succinate (TOPROL-XL) 50 MG 24 hr tablet Take 50 mg by mouth every evening.            While in the hospital, will manage blood pressure as follows; Continue home antihypertensive regimen    Will utilize p.r.n. blood pressure medication only if patient's blood pressure greater than 160/100 and he develops symptoms such as worsening chest pain or shortness of breath.    Hypothyroid  Patient has chronic hypothyroidism. TFTs reviewed-   Lab Results   Component Value Date    TSH 1.46 03/25/2024   . Will continue chronic levothyroxine and adjust for and clinical changes.          VTE Risk Mitigation (From admission, onward)           Ordered     heparin 25,000 units in dextrose 5% (100  units/ml) IV bolus from bag LOW INTENSITY nomogram - [facility]  As needed (PRN)        Question:  Heparin Infusion Adjustment (DO NOT MODIFY ANSWER)  Answer:  [email or web address]\epic\Images\Pharmacy\HeparinInfusions\heparin LOW INTENSITY nomogram for [facility] [id number]    05/02/24 0314     heparin 25,000 units in dextrose 5% (100 units/ml) IV bolus from bag LOW INTENSITY nomogram - [facility]  As needed (PRN)        Question:  Heparin Infusion Adjustment (DO NOT MODIFY ANSWER)  Answer:  [email or web address]\epic\Images\Pharmacy\HeparinInfusions\heparin LOW INTENSITY nomogram for [facility] [id number]    05/02/24 0314     IP VTE HIGH RISK PATIENT  Once         05/02/24 0324     Place sequential compression device  Until discontinued         05/02/24 0324     heparin 25,000 units in dextrose 5% 250 mL (100 units/mL) infusion LOW INTENSITY nomogram - [facility]  Continuous        Question:  Begin at (units/kg/hr)  Answer:  12    05/02/24 0314                  //Core Measures   -DVT proph: SCDs, heparin drip  -Code status Full    -Surrogate:spouse    Components of this note were documented using a voice recognition system and are subject to errors not corrected at the time the document was proof read. Please contact the author for any clarifications.       Kwame Brown NP  Department of Hospital Medicine  [facility] - Telemetry ([facility])

## 2024-05-02 NOTE — ASSESSMENT & PLAN NOTE
-Patient with history of CAD s/p remote CABG who presents with L shoulder pain/CP and elevated troponin---consistent with NSTEMI  -Troponin 0.072>1.759>41.611, repeat pending  -EKG reviewed, ischemic changes with QRS widening in multiple leads  -Continue ASA, heparin gtt, CCB, BB  -Patient intolerant to statin  -R/LHC planned today pending creatinine trend

## 2024-05-02 NOTE — ASSESSMENT & PLAN NOTE
Patient with known CAD s/p stent placement and CABG, which is uncontrolled Will continue ASA and Statin and monitor for S/Sx of angina/ACS. Continue to monitor on telemetry.     - s/p C 5/2 by Dr Kumar with stents to vein graft and IABP placed  - tirofiban infusion ongoing  - ASA/ticag oral  - cardiac monitoring  - Cardiology following

## 2024-05-02 NOTE — ASSESSMENT & PLAN NOTE
Patient's FSGs are controlled on current medication regimen.  Last A1c reviewed-   Lab Results   Component Value Date    HGBA1C 6.4 (H) 05/02/2024     Most recent fingerstick glucose reviewed-   Recent Labs   Lab 05/02/24  0352 05/02/24  0621 05/02/24  1245   POCTGLUCOSE 268* 227* 174*     Current correctional scale  Medium  Maintain anti-hyperglycemic dose as follows-   Antihyperglycemics (From admission, onward)      Start     Stop Route Frequency Ordered    05/02/24 0900  insulin detemir U-100 (Levemir) pen 11 Units         -- SubQ Daily 05/02/24 0435    05/02/24 0426  insulin aspart U-100 pen 0-10 Units         -- SubQ Every 6 hours PRN 05/02/24 0326          Hold Oral hypoglycemics while patient is in the hospital.

## 2024-05-02 NOTE — H&P (VIEW-ONLY)
[facility] - Telemetry ([facility])  Cardiology  Consult Note    Patient Name: Nahum Palacio  MRN: [id number]  Admission Date: 5/1/2024  Hospital Length of Stay: 0 days  Code Status: Full Code   Attending Provider: Chan Jimenes MD   Consulting Provider: Christelle Gonzalez PA-C  Primary Care Physician: Eric Foster MD  Principal Problem:NSTEMI (non-ST elevated myocardial infarction)    Patient information was obtained from patient, past medical records, and ER records.     Inpatient consult to Cardiology  Consult performed by: Christelle Gonzalez PA-C  Consult ordered by: Kwame Brown NP        Subjective:     Chief Complaint:  NSTEMI    HPI:   Mr. Palacio is a 73 year old male patient whose current medical conditions include CAD s/p remote CABG in 2006 (LIMA-LAD, SVG-Ramus, SVG-RCA), hyperlipidemia, HTN, and statin intolerance who presented to [facility] ED overnight due to persistent heavy left shoulder pain. Associated symptoms included jaw discomfort, nausea, and pain near his left pectoral muscle. He denied any associated vomiting, diaphoresis, palpitations, near syncope, or syncope. Reported he had been dealing with left shoulder pain for quite some time due to prior fall and thought it was due to MSK/arthritic pain but became concerned when pain persisted despite warm compresses/hot shower. Initial workup in ED revealed troponin of 0.072>0.1759 and BNP of 978. Patient subsequently admitted for further evaluation and treatment. Cardiology consulted to assist with management. Patient seen and examined today, resting in bed. Feeling better. Denies any left shoulder pain/CP during exam. No other CV complaints. He reports compliance with his medications. Followed on OP basis by Dr. Montana. Troponin bumped up to 41. EKG reviewed, shows ischemic changes/QRS widening in multiple leads. Preliminary bedside read of TTE by MD revealed depressed EF. Plans for R/LHC today pending repeat BMP.     Past Medical  History:   Diagnosis Date    CAD (coronary artery disease) of artery bypass graft     Diabetes mellitus     HLD (hyperlipidemia)     Hypertension     Statin intolerance        Past Surgical History:   Procedure Laterality Date    ARTHROPLASTY OF HIP BY ANTERIOR APPROACH Left 11/25/2022    Procedure: ARTHROPLASTY, HIP, TOTAL, ANTERIOR APPROACH;  Surgeon: Félix Ventura MD;  Location: [facility];  Service: Orthopedics;  Laterality: Left;    CORONARY ARTERY BYPASS GRAFT (CABG)         Review of patient's allergies indicates:   Allergen Reactions    Statins-hmg-coa reductase inhibitors Other (See Comments)     Muscle aches  Joint Stiffness       Current Facility-Administered Medications   Medication Dose Route Frequency Provider Last Rate Last Admin    0.9%  NaCl infusion   Intravenous Continuous Christelle Gonzalez PA-C 75 mL/hr at 05/02/24 0835 New Bag at 05/02/24 0835    amLODIPine tablet 5 mg  5 mg Oral Daily Kwame Brown NP   5 mg at 05/02/24 0817    [START ON 5/3/2024] aspirin chewable tablet 81 mg  81 mg Oral Daily Kwame Brown NP        dextrose 10% bolus 125 mL 125 mL  12.5 g Intravenous PRN Kwame Brown NP        dextrose 10% bolus 250 mL 250 mL  25 g Intravenous PRN Kwame Brown NP        ferrous sulfate tablet 1 each  1 tablet Oral Daily Kwame Brown NP   1 each at 05/02/24 0817    glucagon (human recombinant) injection 1 mg  1 mg Intramuscular PRN Kwame Brown NP        heparin 25,000 units in dextrose 5% (100 units/ml) IV bolus from bag LOW INTENSITY nomogram - [facility]  51.7 Units/kg Intravenous PRN Samuel Keating MD        heparin 25,000 units in dextrose 5% (100 units/ml) IV bolus from bag LOW INTENSITY nomogram - [facility]  30 Units/kg Intravenous PRN Samuel Keating MD        heparin 25,000 units in dextrose 5% 250 mL (100 units/mL) infusion LOW INTENSITY nomogram - [facility]  0-40 Units/kg/hr Intravenous Continuous Samuel Keating MD 9.3 mL/hr at 05/02/24 0341 12 Units/kg/hr at  05/02/24 0341    insulin aspart U-100 pen 0-10 Units  0-10 Units Subcutaneous Q6H PRN Kwame Brown NP   4 Units at 05/02/24 0626    insulin detemir U-100 (Levemir) pen 11 Units  11 Units Subcutaneous Daily Kwame Brown NP   11 Units at 05/02/24 0818    levothyroxine tablet 50 mcg  50 mcg Oral Before breakfast Kwame rBown NP   50 mcg at 05/02/24 0622    metoprolol succinate (TOPROL-XL) 24 hr tablet 50 mg  50 mg Oral QHS Kwame Brown NP        nitroGLYCERIN SL tablet 0.4 mg  0.4 mg Sublingual Q5 Min PRN Kwame Brown NP        tamsulosin 24 hr capsule 0.4 mg  0.4 mg Oral Daily Kwame Brown NP   0.4 mg at 05/02/24 0817     Family History    None       Tobacco Use    Smoking status: Former     Types: Cigarettes     Passive exposure: Past    Smokeless tobacco: Never   Substance and Sexual Activity    Alcohol use: Never    Drug use: Never    Sexual activity: Not on file     Review of Systems   Constitutional: Positive for malaise/fatigue.   HENT:          Jaw pain   Eyes: Negative.    Cardiovascular: Negative.    Respiratory: Negative.     Endocrine: Negative.    Hematologic/Lymphatic: Negative.    Skin: Negative.    Musculoskeletal:  Positive for arthritis and joint pain (L shoulder).   Gastrointestinal:  Positive for nausea.   Genitourinary: Negative.    Neurological:  Positive for weakness.   Psychiatric/Behavioral: Negative.     Allergic/Immunologic: Negative.      Objective:     Vital Signs (Most Recent):  Temp: 97.7 °F (36.5 °C) (05/02/24 0810)  Pulse: 81 (05/02/24 0810)  Resp: 18 (05/02/24 0810)  BP: (!) 144/82 (05/02/24 0810)  SpO2: 95 % (05/02/24 0810) Vital Signs (24h Range):  Temp:  [97.7 °F (36.5 °C)-98.1 °F (36.7 °C)] 97.7 °F (36.5 °C)  Pulse:  [81-94] 81  Resp:  [18-21] 18  SpO2:  [90 %-97 %] 95 %  BP: (121-157)/(72-98) 144/82     Weight: 77.1 kg (170 lb)  Body mass index is 28.29 kg/m².    SpO2: 95 %         Intake/Output Summary (Last 24 hours) at 5/2/2024  1034  Last data filed at 5/2/2024 0815  Gross per 24 hour   Intake 0 ml   Output 900 ml   Net -900 ml       Lines/Drains/Airways       Peripheral Intravenous Line  Duration                  Peripheral IV - Single Lumen 05/01/24 2329 20 G Right Antecubital <1 day         Peripheral IV - Single Lumen 05/02/24 0315 20 G Left Antecubital <1 day                     Physical Exam  Vitals and nursing note reviewed.   Constitutional:       General: He is not in acute distress.     Appearance: He is well-developed. He is ill-appearing. He is not diaphoretic.      Comments: On supplemental O2 via NC   HENT:      Head: Normocephalic and atraumatic.   Eyes:      General:         Right eye: No discharge.         Left eye: No discharge.      Pupils: Pupils are equal, round, and reactive to light.   Cardiovascular:      Rate and Rhythm: Normal rate and regular rhythm.      Heart sounds: Normal heart sounds, S1 normal and S2 normal. No murmur heard.  Pulmonary:      Effort: Pulmonary effort is normal. No respiratory distress.      Breath sounds: Rales present. No wheezing.   Abdominal:      General: There is no distension.   Musculoskeletal:      Right lower leg: Edema present.      Left lower leg: Edema present.   Skin:     General: Skin is dry.      Findings: No erythema.      Comments: BLE cool   Neurological:      Mental Status: He is alert and oriented to person, place, and time.          Significant Labs: CMP   Recent Labs   Lab 05/01/24  2318      K 4.0      CO2 22*   *   BUN 23   CREATININE 1.8*   CALCIUM 9.4   PROT 7.4   ALBUMIN 3.6   BILITOT 0.4   ALKPHOS 79   AST 16   ALT 17   ANIONGAP 11   , CBC   Recent Labs   Lab 05/01/24  2318 05/02/24  0322   WBC 8.94 11.71   HGB 14.3 14.7   HCT 44.6 46.0    276   , Troponin   Recent Labs   Lab 05/02/24  0222 05/02/24  0614 05/02/24  0949   TROPONINI 1.759* 41.611* >50.000*   , and All pertinent lab results from the last 24 hours have been  reviewed.    Significant Imaging: Echocardiogram: Transthoracic echo (TTE) complete (Cupid Only):   Results for orders placed or performed during the hospital encounter of 05/01/24   Echo   Result Value Ref Range    BSA 1.88 m2    Pineda's Biplane MOD Ejection Fraction 26 %    LVOT stroke volume 47.18 cm3    LVIDd 5.64 3.5 - 6.0 cm    LV Systolic Volume 110.97 mL    LV Systolic Volume Index 60.0 mL/m2    LVIDs 4.87 (A) 2.1 - 4.0 cm    LV Diastolic Volume 156.29 mL    LV Diastolic Volume Index 84.48 mL/m2    IVS 0.95 0.6 - 1.1 cm    LVOT diameter 2.05 cm    LVOT area 3.3 cm2    FS 14 (A) 28 - 44 %    Left Ventricle Relative Wall Thickness 0.25 cm    Posterior Wall 0.71 0.6 - 1.1 cm    LV mass 175.00 g    LV Mass Index 95 g/m2    MV Peak E Chandra 0.50 m/s    TDI SEPTAL 0.04 m/s    MV Peak A Chandra 0.61 m/s    TR Max Chandra 2.38 m/s    E/A ratio 0.82     IVRT 110.37 msec    E wave deceleration time 146.15 msec    LV SEPTAL E/E' RATIO 12.50 m/s    LVOT peak chandra 0.72 m/s    Left Ventricular Outflow Tract Mean Velocity 0.51 cm/s    Left Ventricular Outflow Tract Mean Gradient 1.19 mmHg    RVDD 3.43 cm    RVOT peak VTI 12.2 cm    TAPSE 1.34 cm    RV/LV Ratio 0.61 cm    LA size 4.33 cm    Left Atrium Minor Axis 4.94 cm    Left Atrium Major Axis 5.34 cm    RA Major Axis 4.26 cm    Vn Nyquist MS 0.33 m/s    AV mean gradient 2 mmHg    AV peak gradient 4 mmHg    Ao peak chandra 1.05 m/s    Ao VTI 20.30 cm    LVOT peak VTI 14.30 cm    AV valve area 2.32 cm²    AV Velocity Ratio 0.69     AV index (prosthetic) 0.70     ANTWAN by Velocity Ratio 2.26 cm²    Radius 0.65 cm    Vn Nyquist 0.33 m/s    Mr max chandra 5.48 m/s    MR PISA EROA 0.16 cm2    MV stenosis pressure 1/2 time 42.38 ms    MV valve area p 1/2 method 5.19 cm2    Triscuspid Valve Regurgitation Peak Gradient 23 mmHg    PV mean gradient 1 mmHg    PV PEAK VELOCITY 0.79 m/s    PV peak gradient 2 mmHg    RVOT peak chandra 0.65 m/s    Ao root annulus 3.11 cm    STJ 2.91 cm    Ascending aorta 3.13  cm    IVC diameter 1.92 cm    ZLVIDS 3.39     ZLVIDD 0.93     LA Volume Index 43.9 mL/m2    LA volume 81.22 cm3    LA WIDTH 4.3 cm    RA Width 3.5 cm    and EKG: Reviewed  Assessment and Plan:   Patient who presents with NSTEMI/acute CHF/ICM. Stable this AM. Continue OMT. Gently diurese. R/LHC planned today pending creatinine trend.     * NSTEMI (non-ST elevated myocardial infarction)  -Patient with history of CAD s/p remote CABG who presents with L shoulder pain/CP and elevated troponin---consistent with NSTEMI  -Troponin 0.072>1.759>41.611, repeat pending  -EKG reviewed, ischemic changes with QRS widening in multiple leads  -Continue ASA, heparin gtt, CCB, BB  -Patient intolerant to statin  -R/LHC planned today pending creatinine trend    CKD (chronic kidney disease)  -Creatinine 1.8, repeat pending    Acute exacerbation of CHF (congestive heart failure)  -BNP elevated and CXR with congestion  -Gently diurese  -Preliminary review of TTE with depressed EF, official report to follow  -Continue BB  -Will further optimize regimen as tolerated  -R/LHC tentatively planned today    Hypertension  -Continue CCB, BB  -Will optimize meds as tolerated    Diabetes mellitus  -Mgmt as per hospital medicine    Coronary artery disease  -See plan under NSTEMI        VTE Risk Mitigation (From admission, onward)           Ordered     heparin 25,000 units in dextrose 5% (100 units/ml) IV bolus from bag LOW INTENSITY nomogram - [facility]  As needed (PRN)        Question:  Heparin Infusion Adjustment (DO NOT MODIFY ANSWER)  Answer:  \\[facility]\Images\Pharmacy\HeparinInfusions\heparin LOW INTENSITY nomogram for [facility] [id number]    05/02/24 0314     heparin 25,000 units in dextrose 5% (100 units/ml) IV bolus from bag LOW INTENSITY nomogram - [facility]  As needed (PRN)        Question:  Heparin Infusion Adjustment (DO NOT MODIFY ANSWER)  Answer:  \\[facility]\[employer]\Images\Pharmacy\HeparinInfusions\heparin LOW INTENSITY nomogram for [facility]  [id number]    05/02/24 0314     IP VTE HIGH RISK PATIENT  Once         05/02/24 0324     Place sequential compression device  Until discontinued         05/02/24 0324     heparin 25,000 units in dextrose 5% 250 mL (100 units/mL) infusion LOW INTENSITY nomogram - [facility]  Continuous        Question:  Begin at (units/kg/hr)  Answer:  12    05/02/24 0314                    Thank you for your consult. I will follow-up with patient. Please contact us if you have any additional questions.    Christelle Gonzalez PA-C  Cardiology   [facility] - Telemetry ([facility])

## 2024-05-03 PROBLEM — I25.5 ISCHEMIC CARDIOMYOPATHY: Status: ACTIVE | Noted: 2024-05-03

## 2024-05-03 PROBLEM — I50.41 ACUTE COMBINED SYSTOLIC AND DIASTOLIC CONGESTIVE HEART FAILURE: Status: ACTIVE | Noted: 2024-05-03

## 2024-05-03 LAB
ALLENS TEST: ABNORMAL
ANION GAP SERPL CALC-SCNC: 14 MMOL/L (ref 8–16)
APTT PPP: 28.1 SEC (ref 21–32)
APTT PPP: 32.8 SEC (ref 21–32)
BASOPHILS # BLD AUTO: 0.05 K/UL (ref 0–0.2)
BASOPHILS NFR BLD: 0.5 % (ref 0–1.9)
BNP SERPL-MCNC: 747 PG/ML (ref 0–99)
BUN SERPL-MCNC: 22 MG/DL (ref 8–23)
CALCIUM SERPL-MCNC: 9.1 MG/DL (ref 8.7–10.5)
CHLORIDE SERPL-SCNC: 102 MMOL/L (ref 95–110)
CO2 SERPL-SCNC: 26 MMOL/L (ref 23–29)
CREAT SERPL-MCNC: 1.7 MG/DL (ref 0.5–1.4)
DELSYS: ABNORMAL
DIFFERENTIAL METHOD BLD: ABNORMAL
EOSINOPHIL # BLD AUTO: 0.1 K/UL (ref 0–0.5)
EOSINOPHIL NFR BLD: 1.2 % (ref 0–8)
ERYTHROCYTE [DISTWIDTH] IN BLOOD BY AUTOMATED COUNT: 15 % (ref 11.5–14.5)
EST. GFR  (NO RACE VARIABLE): 42 ML/MIN/1.73 M^2
FLOW: 4
GLUCOSE SERPL-MCNC: 176 MG/DL (ref 70–110)
GLUCOSE SERPL-MCNC: 198 MG/DL (ref 70–110)
HCO3 UR-SCNC: 29.1 MMOL/L (ref 24–28)
HCT VFR BLD AUTO: 47 % (ref 40–54)
HGB BLD-MCNC: 15.2 G/DL (ref 14–18)
IMM GRANULOCYTES # BLD AUTO: 0.07 K/UL (ref 0–0.04)
IMM GRANULOCYTES NFR BLD AUTO: 0.7 % (ref 0–0.5)
LACTATE SERPL-SCNC: 1.7 MMOL/L (ref 0.5–2.2)
LACTATE SERPL-SCNC: 2.5 MMOL/L (ref 0.5–2.2)
LYMPHOCYTES # BLD AUTO: 1.1 K/UL (ref 1–4.8)
LYMPHOCYTES NFR BLD: 12 % (ref 18–48)
MAGNESIUM SERPL-MCNC: 1.8 MG/DL (ref 1.6–2.6)
MCH RBC QN AUTO: 29 PG (ref 27–31)
MCHC RBC AUTO-ENTMCNC: 32.3 G/DL (ref 32–36)
MCV RBC AUTO: 90 FL (ref 82–98)
MODE: ABNORMAL
MONOCYTES # BLD AUTO: 1 K/UL (ref 0.3–1)
MONOCYTES NFR BLD: 10.7 % (ref 4–15)
NEUTROPHILS # BLD AUTO: 7 K/UL (ref 1.8–7.7)
NEUTROPHILS NFR BLD: 74.9 % (ref 38–73)
NRBC BLD-RTO: 0 /100 WBC
OHS QRS DURATION: 104 MS
OHS QRS DURATION: 114 MS
OHS QRS DURATION: 116 MS
OHS QRS DURATION: 130 MS
OHS QRS DURATION: 96 MS
OHS QTC CALCULATION: 480 MS
OHS QTC CALCULATION: 480 MS
OHS QTC CALCULATION: 483 MS
OHS QTC CALCULATION: 485 MS
OHS QTC CALCULATION: 486 MS
PCO2 BLDA: 42.4 MMHG
PH SMN: 7.44 [PH] (ref 7.35–7.45)
PHOSPHATE SERPL-MCNC: 3.5 MG/DL (ref 2.7–4.5)
PLATELET # BLD AUTO: 273 K/UL (ref 150–450)
PMV BLD AUTO: 9.5 FL (ref 9.2–12.9)
PO2 BLDA: 32 MMHG (ref 80–100)
POC ACTIVATED CLOTTING TIME K: 157 SEC (ref 74–137)
POC BE: 5 MMOL/L
POC SATURATED O2: 63 %
POCT GLUCOSE: 167 MG/DL (ref 70–110)
POCT GLUCOSE: 168 MG/DL (ref 70–110)
POCT GLUCOSE: 198 MG/DL (ref 70–110)
POTASSIUM SERPL-SCNC: 3.6 MMOL/L (ref 3.5–5.1)
RBC # BLD AUTO: 5.25 M/UL (ref 4.6–6.2)
SAMPLE: ABNORMAL
SAMPLE: ABNORMAL
SITE: ABNORMAL
SODIUM SERPL-SCNC: 142 MMOL/L (ref 136–145)
SP02: 93
TROPONIN I SERPL DL<=0.01 NG/ML-MCNC: >50 NG/ML (ref 0–0.03)
WBC # BLD AUTO: 9.37 K/UL (ref 3.9–12.7)

## 2024-05-03 PROCEDURE — 25000003 PHARM REV CODE 250: Performed by: INTERNAL MEDICINE

## 2024-05-03 PROCEDURE — 80048 BASIC METABOLIC PNL TOTAL CA: CPT | Performed by: INTERNAL MEDICINE

## 2024-05-03 PROCEDURE — 27000221 HC OXYGEN, UP TO 24 HOURS

## 2024-05-03 PROCEDURE — 94761 N-INVAS EAR/PLS OXIMETRY MLT: CPT

## 2024-05-03 PROCEDURE — 99900035 HC TECH TIME PER 15 MIN (STAT)

## 2024-05-03 PROCEDURE — 25000003 PHARM REV CODE 250: Performed by: NURSE PRACTITIONER

## 2024-05-03 PROCEDURE — 63600175 PHARM REV CODE 636 W HCPCS: Performed by: INTERNAL MEDICINE

## 2024-05-03 PROCEDURE — 36415 COLL VENOUS BLD VENIPUNCTURE: CPT | Performed by: NURSE PRACTITIONER

## 2024-05-03 PROCEDURE — 93010 ELECTROCARDIOGRAM REPORT: CPT | Mod: ,,, | Performed by: INTERNAL MEDICINE

## 2024-05-03 PROCEDURE — 93005 ELECTROCARDIOGRAM TRACING: CPT

## 2024-05-03 PROCEDURE — 83605 ASSAY OF LACTIC ACID: CPT | Mod: 91 | Performed by: NURSE PRACTITIONER

## 2024-05-03 PROCEDURE — 63600175 PHARM REV CODE 636 W HCPCS: Performed by: NURSE PRACTITIONER

## 2024-05-03 PROCEDURE — 20000000 HC ICU ROOM

## 2024-05-03 PROCEDURE — 84100 ASSAY OF PHOSPHORUS: CPT | Performed by: INTERNAL MEDICINE

## 2024-05-03 PROCEDURE — 83880 ASSAY OF NATRIURETIC PEPTIDE: CPT | Performed by: INTERNAL MEDICINE

## 2024-05-03 PROCEDURE — 85025 COMPLETE CBC W/AUTO DIFF WBC: CPT | Performed by: INTERNAL MEDICINE

## 2024-05-03 PROCEDURE — 02HV33Z INSERTION OF INFUSION DEVICE INTO SUPERIOR VENA CAVA, PERCUTANEOUS APPROACH: ICD-10-PCS | Performed by: INTERNAL MEDICINE

## 2024-05-03 PROCEDURE — 83735 ASSAY OF MAGNESIUM: CPT | Performed by: INTERNAL MEDICINE

## 2024-05-03 PROCEDURE — 84484 ASSAY OF TROPONIN QUANT: CPT | Performed by: INTERNAL MEDICINE

## 2024-05-03 PROCEDURE — 36415 COLL VENOUS BLD VENIPUNCTURE: CPT | Performed by: INTERNAL MEDICINE

## 2024-05-03 PROCEDURE — 63600175 PHARM REV CODE 636 W HCPCS

## 2024-05-03 PROCEDURE — 85730 THROMBOPLASTIN TIME PARTIAL: CPT | Performed by: INTERNAL MEDICINE

## 2024-05-03 PROCEDURE — 99291 CRITICAL CARE FIRST HOUR: CPT | Mod: ,,, | Performed by: INTERNAL MEDICINE

## 2024-05-03 PROCEDURE — 85730 THROMBOPLASTIN TIME PARTIAL: CPT | Mod: 91 | Performed by: INTERNAL MEDICINE

## 2024-05-03 RX ORDER — HEPARIN SODIUM,PORCINE/D5W 25000/250
0-40 INTRAVENOUS SOLUTION INTRAVENOUS CONTINUOUS
Status: DISCONTINUED | OUTPATIENT
Start: 2024-05-03 | End: 2024-05-03

## 2024-05-03 RX ORDER — MORPHINE SULFATE 2 MG/ML
2 INJECTION, SOLUTION INTRAMUSCULAR; INTRAVENOUS ONCE
Status: COMPLETED | OUTPATIENT
Start: 2024-05-03 | End: 2024-05-03

## 2024-05-03 RX ORDER — MAGNESIUM SULFATE HEPTAHYDRATE 40 MG/ML
2 INJECTION, SOLUTION INTRAVENOUS ONCE
Status: COMPLETED | OUTPATIENT
Start: 2024-05-03 | End: 2024-05-03

## 2024-05-03 RX ORDER — LOSARTAN POTASSIUM 25 MG/1
25 TABLET ORAL DAILY
Status: DISCONTINUED | OUTPATIENT
Start: 2024-05-03 | End: 2024-05-04

## 2024-05-03 RX ORDER — LORAZEPAM 2 MG/ML
0.5 INJECTION INTRAMUSCULAR ONCE
Status: COMPLETED | OUTPATIENT
Start: 2024-05-03 | End: 2024-05-03

## 2024-05-03 RX ORDER — LANOLIN ALCOHOL/MO/W.PET/CERES
800 CREAM (GRAM) TOPICAL
Status: DISCONTINUED | OUTPATIENT
Start: 2024-05-03 | End: 2024-05-08 | Stop reason: HOSPADM

## 2024-05-03 RX ORDER — DOBUTAMINE HYDROCHLORIDE 400 MG/100ML
2.5 INJECTION INTRAVENOUS CONTINUOUS
Status: DISCONTINUED | OUTPATIENT
Start: 2024-05-03 | End: 2024-05-04

## 2024-05-03 RX ORDER — MORPHINE SULFATE 2 MG/ML
INJECTION, SOLUTION INTRAMUSCULAR; INTRAVENOUS
Status: COMPLETED
Start: 2024-05-03 | End: 2024-05-03

## 2024-05-03 RX ORDER — ISOSORBIDE MONONITRATE 30 MG/1
30 TABLET, EXTENDED RELEASE ORAL DAILY
Status: DISCONTINUED | OUTPATIENT
Start: 2024-05-03 | End: 2024-05-05

## 2024-05-03 RX ORDER — SODIUM CHLORIDE 9 MG/ML
INJECTION, SOLUTION INTRAVENOUS CONTINUOUS
Status: ACTIVE | OUTPATIENT
Start: 2024-05-03 | End: 2024-05-03

## 2024-05-03 RX ORDER — HYDRALAZINE HYDROCHLORIDE 20 MG/ML
10 INJECTION INTRAMUSCULAR; INTRAVENOUS EVERY 6 HOURS PRN
Status: DISCONTINUED | OUTPATIENT
Start: 2024-05-03 | End: 2024-05-08 | Stop reason: HOSPADM

## 2024-05-03 RX ORDER — LORAZEPAM 2 MG/ML
INJECTION INTRAMUSCULAR
Status: COMPLETED
Start: 2024-05-03 | End: 2024-05-03

## 2024-05-03 RX ADMIN — INSULIN ASPART 1 UNITS: 100 INJECTION, SOLUTION INTRAVENOUS; SUBCUTANEOUS at 08:05

## 2024-05-03 RX ADMIN — HEPARIN SODIUM 12 UNITS/KG/HR: 10000 INJECTION, SOLUTION INTRAVENOUS at 05:05

## 2024-05-03 RX ADMIN — LORAZEPAM 0.5 MG: 2 INJECTION INTRAMUSCULAR at 12:05

## 2024-05-03 RX ADMIN — MUPIROCIN: 20 OINTMENT TOPICAL at 08:05

## 2024-05-03 RX ADMIN — INSULIN DETEMIR 11 UNITS: 100 INJECTION, SOLUTION SUBCUTANEOUS at 08:05

## 2024-05-03 RX ADMIN — TICAGRELOR 90 MG: 90 TABLET ORAL at 08:05

## 2024-05-03 RX ADMIN — MAGNESIUM SULFATE HEPTAHYDRATE 2 G: 40 INJECTION, SOLUTION INTRAVENOUS at 09:05

## 2024-05-03 RX ADMIN — POTASSIUM BICARBONATE 50 MEQ: 978 TABLET, EFFERVESCENT ORAL at 12:05

## 2024-05-03 RX ADMIN — HYDRALAZINE HYDROCHLORIDE 10 MG: 20 INJECTION, SOLUTION INTRAMUSCULAR; INTRAVENOUS at 04:05

## 2024-05-03 RX ADMIN — ISOSORBIDE MONONITRATE 30 MG: 30 TABLET, EXTENDED RELEASE ORAL at 06:05

## 2024-05-03 RX ADMIN — FERROUS SULFATE TAB 325 MG (65 MG ELEMENTAL FE) 1 EACH: 325 (65 FE) TAB at 08:05

## 2024-05-03 RX ADMIN — INSULIN ASPART 2 UNITS: 100 INJECTION, SOLUTION INTRAVENOUS; SUBCUTANEOUS at 12:05

## 2024-05-03 RX ADMIN — LOSARTAN POTASSIUM 25 MG: 25 TABLET, FILM COATED ORAL at 06:05

## 2024-05-03 RX ADMIN — LEVOTHYROXINE SODIUM 50 MCG: 50 TABLET ORAL at 05:05

## 2024-05-03 RX ADMIN — SODIUM CHLORIDE 250 ML: 9 INJECTION, SOLUTION INTRAVENOUS at 12:05

## 2024-05-03 RX ADMIN — MORPHINE SULFATE 2 MG: 2 INJECTION, SOLUTION INTRAMUSCULAR; INTRAVENOUS at 12:05

## 2024-05-03 RX ADMIN — SODIUM CHLORIDE: 9 INJECTION, SOLUTION INTRAVENOUS at 12:05

## 2024-05-03 RX ADMIN — DOBUTAMINE HYDROCHLORIDE 2.5 MCG/KG/MIN: 400 INJECTION INTRAVENOUS at 06:05

## 2024-05-03 RX ADMIN — TAMSULOSIN HYDROCHLORIDE 0.4 MG: 0.4 CAPSULE ORAL at 08:05

## 2024-05-03 RX ADMIN — ASPIRIN 81 MG CHEWABLE TABLET 81 MG: 81 TABLET CHEWABLE at 08:05

## 2024-05-03 RX ADMIN — INSULIN ASPART 2 UNITS: 100 INJECTION, SOLUTION INTRAVENOUS; SUBCUTANEOUS at 05:05

## 2024-05-03 RX ADMIN — METOPROLOL SUCCINATE 50 MG: 50 TABLET, EXTENDED RELEASE ORAL at 08:05

## 2024-05-03 RX ADMIN — LORAZEPAM 0.5 MG: 2 INJECTION INTRAMUSCULAR; INTRAVENOUS at 12:05

## 2024-05-03 RX ADMIN — POTASSIUM BICARBONATE 50 MEQ: 978 TABLET, EFFERVESCENT ORAL at 05:05

## 2024-05-03 NOTE — HOSPITAL COURSE
5/2 - admitted to ICU post-cath with IABP in place.  Awake and conversant.  5/3 - lactic bumped overnight. CVL placed with acceptable ScvO2.  Plan for IABP out.

## 2024-05-03 NOTE — PROCEDURES
"Nahum Palacio is a 73 y.o. male patient.    Temp: 98.5 °F (36.9 °C) (05/03/24 0330)  Pulse: 69 (05/03/24 0717)  Resp: 17 (05/03/24 0717)  BP: 130/79 (05/03/24 0646)  SpO2: 97 % (05/03/24 0717)  Weight: 77.1 kg (170 lb) (05/02/24 0810)  Height: 5' 5" (165.1 cm) (05/02/24 0810)  Mallampati Scale: Class II  ASA Classification: Class 2    Central Line    Date/Time: 5/3/2024 10:34 AM    Performed by: Eric Cameron MD  Authorized by: Eric Cameron MD    Location procedure was performed:  [facility] PULMONARY MEDICINE  Consent Done ?:  Yes  Indications:  Hemodynamic monitoring  Anesthesia:  Local infiltration  Preparation:  Skin prepped with ChloraPrep  Skin prep agent dried: Skin prep agent completely dried prior to procedure    Sterile barriers: All five maximal sterile barriers used - gloves, gown, cap, mask and large sterile sheet    Hand hygiene: Hand hygiene performed immediately prior to central venous catheter insertion    Location:  Right internal jugular  Catheter type:  Triple lumen  Catheter size:  7 Fr  Ultrasound guidance: Yes    Vessel Caliber:  Medium  Comprressibility:  Normal  Needle advanced into vessel with real time ultrasound guidance.    Guidewire confirmed in vessel.    Steril sheath on probe.    Sterile gel used.  Manometry: No    Number of attempts:  1  Securement:  Line sutured, chlorhexidine patch and sterile dressing applied  Complications: No    Specimens: No    Implants: No    Other Complications:  Cxr pending   Cxr pending      5/3/2024    "

## 2024-05-03 NOTE — SUBJECTIVE & OBJECTIVE
Interval History: denies chest pain    Review of Systems   Constitutional: Positive for malaise/fatigue.   Eyes:  Negative for blurred vision.   Cardiovascular:  Negative for chest pain, claudication, cyanosis, dyspnea on exertion, irregular heartbeat, leg swelling, near-syncope, orthopnea, palpitations and paroxysmal nocturnal dyspnea.   Respiratory:  Negative for cough, hemoptysis and shortness of breath.    Hematologic/Lymphatic: Negative for bleeding problem. Does not bruise/bleed easily.   Skin:  Negative for dry skin and itching.   Musculoskeletal:  Negative for falls, muscle weakness and myalgias.   Gastrointestinal:  Negative for abdominal pain, diarrhea, heartburn, hematemesis, hematochezia and melena.   Genitourinary:  Negative for flank pain and hematuria.   Neurological:  Negative for dizziness, focal weakness, headaches, light-headedness, numbness, paresthesias, seizures and weakness.   Psychiatric/Behavioral:  Negative for altered mental status and memory loss. The patient is not nervous/anxious.    Allergic/Immunologic: Negative for hives.     Objective:     Vital Signs (Most Recent):  Temp: 98.5 °F (36.9 °C) (05/03/24 0330)  Pulse: 69 (05/03/24 0717)  Resp: 17 (05/03/24 0717)  BP: 130/79 (05/03/24 0646)  SpO2: 97 % (05/03/24 0717) Vital Signs (24h Range):  Temp:  [97.8 °F (36.6 °C)-98.5 °F (36.9 °C)] 98.5 °F (36.9 °C)  Pulse:  [] 69  Resp:  [10-28] 17  SpO2:  [92 %-100 %] 97 %  BP: (113-169)/() 130/79     Weight: 77.1 kg (170 lb)  Body mass index is 28.29 kg/m².     SpO2: 97 %         Intake/Output Summary (Last 24 hours) at 5/3/2024 0840  Last data filed at 5/3/2024 0600  Gross per 24 hour   Intake 1293.49 ml   Output 2960 ml   Net -1666.51 ml       Lines/Drains/Airways       Line  Duration                  IABP 05/02/24 1700 7.5 Fr. 40 mL <1 day              Peripheral Intravenous Line  Duration                  Peripheral IV - Single Lumen 05/01/24 2329 20 G Right Antecubital 1 day          Peripheral IV - Single Lumen 05/02/24 0315 20 G Left Antecubital 1 day                       Physical Exam  Vitals and nursing note reviewed.   Constitutional:       General: He is not in acute distress.     Appearance: He is well-developed. He is not diaphoretic.   HENT:      Head: Normocephalic and atraumatic.   Eyes:      General:         Right eye: No discharge.         Left eye: No discharge.      Pupils: Pupils are equal, round, and reactive to light.   Neck:      Thyroid: No thyromegaly.      Vascular: No carotid bruit or JVD.   Cardiovascular:      Rate and Rhythm: Normal rate and regular rhythm.      Pulses: Normal pulses and intact distal pulses.      Heart sounds: Murmur heard.      High-pitched blowing holosystolic murmur is present with a grade of 2/6 at the apex.      No friction rub. No gallop.   Pulmonary:      Effort: Pulmonary effort is normal. No respiratory distress.      Breath sounds: Normal breath sounds. No wheezing or rales.   Chest:      Chest wall: No tenderness.   Abdominal:      General: Bowel sounds are normal. There is no distension.      Palpations: Abdomen is soft.      Tenderness: There is no abdominal tenderness.   Musculoskeletal:         General: Normal range of motion.      Cervical back: Neck supple.      Right lower leg: No edema.      Left lower leg: No edema.   Skin:     General: Skin is warm and dry.      Findings: No erythema or rash.      Comments: Cool feet   Neurological:      Mental Status: He is alert and oriented to person, place, and time.      Cranial Nerves: No cranial nerve deficit.   Psychiatric:         Behavior: Behavior normal.            Significant Labs:   Recent Lab Results  (Last 5 results in the past 24 hours)        05/03/24  0428   05/02/24 2010 05/02/24  1801   05/02/24  1707   05/02/24  1659        Anion Gap 14               PTT 28.1  Comment: Refer to local heparin nomogram for intensity/dose specific   therapeutic   range.                  Baso # 0.05               Basophil % 0.5               BUN 22               Calcium 9.1               Chloride 102               CO2 26               Creatinine 1.7               Differential Method Automated               eGFR 42               Eos # 0.1               Eos % 1.2               Glucose 176               Gran # (ANC) 7.0               Gran % 74.9               Hematocrit 47.0               Hemoglobin 15.2               Immature Grans (Abs) 0.07  Comment: Mild elevation in immature granulocytes is non specific and   can be seen in a variety of conditions including stress response,   acute inflammation, trauma and pregnancy. Correlation with other   laboratory and clinical findings is essential.                 Immature Granulocytes 0.7               Lactic Acid Level 2.5  Comment: Falsely low lactic acid results can be found in samples   containing >=13.0 mg/dL total bilirubin and/or >=3.5 mg/dL   direct bilirubin.         2.1  Comment: Falsely low lactic acid results can be found in samples   containing >=13.0 mg/dL total bilirubin and/or >=3.5 mg/dL   direct bilirubin.           Lymph # 1.1               Lymph % 12.0               Magnesium  1.8               MCH 29.0               MCHC 32.3               MCV 90               Mono # 1.0               Mono % 10.7               MPV 9.5               nRBC 0               Platelet Count 273               POC ACTIVATED CLOTTING TIME K         228       POCT Glucose   151             Potassium 3.6               RBC 5.25               RDW 15.0               Sample         unknown       Sodium 142               Troponin I >50.000  Comment: The reference interval for Troponin I represents the 99th percentile   cutoff   for our facility and is consistent with 3rd generation assay   performance.       >50.000  Comment: The reference interval for Troponin I represents the 99th percentile   cutoff   for our facility and is consistent with 3rd generation assay    performance.             WBC 9.37                                      Significant Imaging: X-Ray: CXR:   Cxr today no edema.

## 2024-05-03 NOTE — ASSESSMENT & PLAN NOTE
Secondary to nstemi   Echo    Interpretation Summary    Left Ventricle: The left ventricle is normal in size. Normal wall thickness. Regional wall motion abnormalities present. See diagram for wall motion findings. Septal motion is consistent with bundle branch block. There is moderately reduced systolic function with a visually estimated ejection fraction of 30 - 40%. Ejection fraction by visual approximation is 30%. Biplane (2D) method of discs ejection fraction is 26%. There is diastolic dysfunction but grade cannot be determined.    Right Ventricle: Normal right ventricular cavity size. Wall thickness is normal. Systolic function is normal.    Left Atrium: Left atrium is moderately dilated.    Mitral Valve: There is moderate regurgitation.    Pulmonary Artery: The estimated pulmonary artery systolic pressure is 31 mmHg.    IVC/SVC: Intermediate venous pressure at 8 mmHg.    Recent Labs   Lab 05/01/24  2318   *     Patientw as diuresed revascularized inotropes started for lactic acidosis.

## 2024-05-03 NOTE — HOSPITAL COURSE
5/3/2024 had svg to pda intervention with iabp support . His cr stable hct stable has no ischemia fly lower extremity. His lactic acid is [id number] dobutamine started. He received nitrates and losartan dropped his bp .iabp on 1:3   No chest pian or shortness of breath tolerated iabp well.    5/4/2024 feels better this morning denies any chf symptoms or angina . Cr 1.5 trending down.no groin hematoma. Bleeding from central line stopped.    5/5/2024 HE FEELS WELL THIS MORNING. HE AHS NO CHEST PAIN OR SHORTNESS OF BREATH HIS CR 1.6.HIS BNP IS MILDLY INCREASED .CVP 8     5/6/24-Patient seen and examined today, resting comfortably in bed. Admits to some mild SOB that is improving. No CP.  Creatinine bumped to 1.7, Aldactone held. LA WNL. Needs LifeVest for SCD prevention.    5/7/24-Patient seen and examined today, sitting up in bedside chair. Feels well. No CV complaints. No CP/SOB. Creatinine bumped to 2.3, K 5.3. Diuretic/ARB held, repeat labs scheduled for this afternoon. Awaiting LifeVest.    5/8/24-Patient seen and examined today, resting in bed. Feels great. Denies CP/SOB. Labs reviewed, creatinine improved to 1.9. LifeVest delivered.

## 2024-05-03 NOTE — ASSESSMENT & PLAN NOTE
Patient is identified as having Systolic (HFrEF) heart failure that is Acute. CHF is currently uncontrolled due to Rales/crackles on pulmonary exam. Latest ECHO performed and demonstrates- Results for orders placed during the hospital encounter of 05/01/24    Echo    Interpretation Summary    Left Ventricle: The left ventricle is normal in size. Normal wall thickness. Regional wall motion abnormalities present. See diagram for wall motion findings. Septal motion is consistent with bundle branch block. There is moderately reduced systolic function with a visually estimated ejection fraction of 30 - 40%. Ejection fraction by visual approximation is 30%. Biplane (2D) method of discs ejection fraction is 26%. There is diastolic dysfunction but grade cannot be determined.    Right Ventricle: Normal right ventricular cavity size. Wall thickness is normal. Systolic function is normal.    Left Atrium: Left atrium is moderately dilated.    Mitral Valve: There is moderate regurgitation.    Pulmonary Artery: The estimated pulmonary artery systolic pressure is 31 mmHg.    IVC/SVC: Intermediate venous pressure at 8 mmHg.  . Continue Beta Blocker and monitor clinical status closely. Monitor on telemetry. Patient is off CHF pathway.  Monitor strict Is&Os and daily weights.  Place on fluid restriction of 1.5 L. Cardiology has been consulted. Continue to stress to patient importance of self efficacy and  on diet for CHF. Last BNP reviewed- and noted below   Recent Labs   Lab 05/03/24  0944   *       - given 40mg IV Lasix in the cath lab  --- redosed overnight  - IABP in place 5/2; possibly out today  - Cardiology following  - CVL placed for CVP and SvO2 monitoring

## 2024-05-03 NOTE — ASSESSMENT & PLAN NOTE
Patient with known CAD s/p stent placement and CABG, which is uncontrolled Will continue ASA and Statin and monitor for S/Sx of angina/ACS. Continue to monitor on telemetry.     - s/p LHC 5/2 by Dr Kumar with stents to vein graft and IABP placed  - tirofiban infusion post-cath  - ASA/ticag oral  - cardiac monitoring  - Cardiology following  - plan for IABP out later today

## 2024-05-03 NOTE — PLAN OF CARE
"Patient remains stable for duration of shift. VSS, SB-SR on monitor. AAOx4. On 4L NC. Voids to urinal without difficulty. Pt remained supine s/t IABP to right groin. Educated pt on turning to prevent pressure ulcers; pt stated that he was "good." IABP site without complications; pt has cool feet and toes (states that that is his baseline). TR band to left wrist removed at 0100; no complications. Heparin drip restarted at 0530. Potassium replacement given PO for morning K. PRN hydralazine given x1 for SBP > 160 and DBP > 100; tolerated without complications.   "

## 2024-05-03 NOTE — PROGRESS NOTES
[facility] - Intensive Care ([facility])  Cardiology  Progress Note    Patient Name: Nahum Palacio  MRN: [id number]  Admission Date: 5/1/2024  Hospital Length of Stay: 1 days  Code Status: Full Code   Attending Physician: Eric Cameron MD   Primary Care Physician: Eric Foster MD  Expected Discharge Date:   Principal Problem:NSTEMI (non-ST elevated myocardial infarction)    Subjective:     Hospital Course:   5/3/2024 had svg to pda intervention with iabp support . His cr stable hct stable has no ischemia fly lower extremity. His lactic acid is [id number] dobutamine started. He received nitrates and losartan dropped his bp .iabp on 1:3   No chest pian or shortness of breath tolerated iabp well.    Interval History: denies chest pain    Review of Systems   Constitutional: Positive for malaise/fatigue.   Eyes:  Negative for blurred vision.   Cardiovascular:  Negative for chest pain, claudication, cyanosis, dyspnea on exertion, irregular heartbeat, leg swelling, near-syncope, orthopnea, palpitations and paroxysmal nocturnal dyspnea.   Respiratory:  Negative for cough, hemoptysis and shortness of breath.    Hematologic/Lymphatic: Negative for bleeding problem. Does not bruise/bleed easily.   Skin:  Negative for dry skin and itching.   Musculoskeletal:  Negative for falls, muscle weakness and myalgias.   Gastrointestinal:  Negative for abdominal pain, diarrhea, heartburn, hematemesis, hematochezia and melena.   Genitourinary:  Negative for flank pain and hematuria.   Neurological:  Negative for dizziness, focal weakness, headaches, light-headedness, numbness, paresthesias, seizures and weakness.   Psychiatric/Behavioral:  Negative for altered mental status and memory loss. The patient is not nervous/anxious.    Allergic/Immunologic: Negative for hives.     Objective:     Vital Signs (Most Recent):  Temp: 98.5 °F (36.9 °C) (05/03/24 0330)  Pulse: 69 (05/03/24 0717)  Resp: 17 (05/03/24 0717)  BP: 130/79 (05/03/24  0646)  SpO2: 97 % (05/03/24 0717) Vital Signs (24h Range):  Temp:  [97.8 °F (36.6 °C)-98.5 °F (36.9 °C)] 98.5 °F (36.9 °C)  Pulse:  [] 69  Resp:  [10-28] 17  SpO2:  [92 %-100 %] 97 %  BP: (113-169)/() 130/79     Weight: 77.1 kg (170 lb)  Body mass index is 28.29 kg/m².     SpO2: 97 %         Intake/Output Summary (Last 24 hours) at 5/3/2024 0840  Last data filed at 5/3/2024 0600  Gross per 24 hour   Intake 1293.49 ml   Output 2960 ml   Net -1666.51 ml       Lines/Drains/Airways       Line  Duration                  IABP 05/02/24 1700 7.5 Fr. 40 mL <1 day              Peripheral Intravenous Line  Duration                  Peripheral IV - Single Lumen 05/01/24 2329 20 G Right Antecubital 1 day         Peripheral IV - Single Lumen 05/02/24 0315 20 G Left Antecubital 1 day                       Physical Exam  Vitals and nursing note reviewed.   Constitutional:       General: He is not in acute distress.     Appearance: He is well-developed. He is not diaphoretic.   HENT:      Head: Normocephalic and atraumatic.   Eyes:      General:         Right eye: No discharge.         Left eye: No discharge.      Pupils: Pupils are equal, round, and reactive to light.   Neck:      Thyroid: No thyromegaly.      Vascular: No carotid bruit or JVD.   Cardiovascular:      Rate and Rhythm: Normal rate and regular rhythm.      Pulses: Normal pulses and intact distal pulses.      Heart sounds: Murmur heard.      High-pitched blowing holosystolic murmur is present with a grade of 2/6 at the apex.      No friction rub. No gallop.   Pulmonary:      Effort: Pulmonary effort is normal. No respiratory distress.      Breath sounds: Normal breath sounds. No wheezing or rales.   Chest:      Chest wall: No tenderness.   Abdominal:      General: Bowel sounds are normal. There is no distension.      Palpations: Abdomen is soft.      Tenderness: There is no abdominal tenderness.   Musculoskeletal:         General: Normal range of motion.       Cervical back: Neck supple.      Right lower leg: No edema.      Left lower leg: No edema.   Skin:     General: Skin is warm and dry.      Findings: No erythema or rash.      Comments: Cool feet   Neurological:      Mental Status: He is alert and oriented to person, place, and time.      Cranial Nerves: No cranial nerve deficit.   Psychiatric:         Behavior: Behavior normal.            Significant Labs:   Recent Lab Results  (Last 5 results in the past 24 hours)        05/03/24  0428   05/02/24 2010 05/02/24  1801   05/02/24  1707   05/02/24  1659        Anion Gap 14               PTT 28.1  Comment: Refer to local heparin nomogram for intensity/dose specific   therapeutic   range.                 Baso # 0.05               Basophil % 0.5               BUN 22               Calcium 9.1               Chloride 102               CO2 26               Creatinine 1.7               Differential Method Automated               eGFR 42               Eos # 0.1               Eos % 1.2               Glucose 176               Gran # (ANC) 7.0               Gran % 74.9               Hematocrit 47.0               Hemoglobin 15.2               Immature Grans (Abs) 0.07  Comment: Mild elevation in immature granulocytes is non specific and   can be seen in a variety of conditions including stress response,   acute inflammation, trauma and pregnancy. Correlation with other   laboratory and clinical findings is essential.                 Immature Granulocytes 0.7               Lactic Acid Level 2.5  Comment: Falsely low lactic acid results can be found in samples   containing >=13.0 mg/dL total bilirubin and/or >=3.5 mg/dL   direct bilirubin.         2.1  Comment: Falsely low lactic acid results can be found in samples   containing >=13.0 mg/dL total bilirubin and/or >=3.5 mg/dL   direct bilirubin.           Lymph # 1.1               Lymph % 12.0               Magnesium  1.8               MCH 29.0               MCHC 32.3                MCV 90               Mono # 1.0               Mono % 10.7               MPV 9.5               nRBC 0               Platelet Count 273               POC ACTIVATED CLOTTING TIME K         228       POCT Glucose   151             Potassium 3.6               RBC 5.25               RDW 15.0               Sample         unknown       Sodium 142               Troponin I >50.000  Comment: The reference interval for Troponin I represents the 99th percentile   cutoff   for our facility and is consistent with 3rd generation assay   performance.       >50.000  Comment: The reference interval for Troponin I represents the 99th percentile   cutoff   for our facility and is consistent with 3rd generation assay   performance.             WBC 9.37                                      Significant Imaging: X-Ray: CXR:   Cxr today no edema.   Assessment and Plan:     Brief HPI: a 72 yo male with cad s/p cabg presents with nstemi and decompensated chf has ckd stage 3a was diuresed lhc performed showed severe native disease with patent lima and svg to pda that has the [place].HE HAD HIGH RISK ANGIOPLASTY AND  STENTING WITH IABP SUPPORT. HE RECOVERED WELL HIS LACTIC ACID IS ELEVATED HE WAS PLACED ON INOTROPES WILL CONTINUE MONITORING AND REPEAT LABS DECISION ON CENTRAL VENOUS MONITORING PENDING LABS DISCUSSED WITH ICU STAFF.    * NSTEMI (non-ST elevated myocardial infarction)  -Patient with history of CAD s/p remote CABG who presents with L shoulder pain/CP and elevated troponin---consistent with NSTEMI  -Troponin 0.072>1.759>41.611, repeat pending  -EKG reviewed, ischemic changes with QRS widening in multiple leads  -Continue ASA, heparin gtt, CCB, BB  -Patient intolerant to statin  -R/LHC planned today pending creatinine trend    5/3/2024  Had svg to pda intervention no complication with IABP support.no angina optimal therapy on board      Acute combined systolic and diastolic congestive heart failure  Secondary to nstemi    Echo    Interpretation Summary    Left Ventricle: The left ventricle is normal in size. Normal wall thickness. Regional wall motion abnormalities present. See diagram for wall motion findings. Septal motion is consistent with bundle branch block. There is moderately reduced systolic function with a visually estimated ejection fraction of 30 - 40%. Ejection fraction by visual approximation is 30%. Biplane (2D) method of discs ejection fraction is 26%. There is diastolic dysfunction but grade cannot be determined.    Right Ventricle: Normal right ventricular cavity size. Wall thickness is normal. Systolic function is normal.    Left Atrium: Left atrium is moderately dilated.    Mitral Valve: There is moderate regurgitation.    Pulmonary Artery: The estimated pulmonary artery systolic pressure is 31 mmHg.    IVC/SVC: Intermediate venous pressure at 8 mmHg.    Recent Labs   Lab 05/01/24  2318   *     Patientw as diuresed revascularized inotropes started for lactic acidosis.     Ischemic cardiomyopathy  See nstemi     Andie djust optimize medical therapy and wean off iabp.    CKD (chronic kidney disease)  -Creatinine 1.8, repeat pending    5/3/2024  Continue monitoring    Acute exacerbation of CHF (congestive heart failure)  -BNP elevated and CXR with congestion  -Gently diurese  -Preliminary review of TTE with depressed EF, official report to follow  -Continue BB  -Will further optimize regimen as tolerated  -R/LHC tentatively planned today    Hypertension  -Continue CCB, BB  -Will optimize meds as tolerated    Diabetes mellitus  -Mgmt as per hospital medicine    Coronary artery disease  -See plan under NSTEMI        VTE Risk Mitigation (From admission, onward)           Ordered     heparin 25,000 units in dextrose 5% (100 units/ml) IV bolus from bag LOW INTENSITY nomogram - [facility]  As needed (PRN)        Question:  Heparin Infusion Adjustment (DO NOT MODIFY ANSWER)  Answer:   [email or web address]\epic\Images\Pharmacy\HeparinInfusions\heparin LOW INTENSITY nomogram for [facility] [id number]    05/03/24 0422     heparin 25,000 units in dextrose 5% (100 units/ml) IV bolus from bag LOW INTENSITY nomogram - [facility]  As needed (PRN)        Question:  Heparin Infusion Adjustment (DO NOT MODIFY ANSWER)  Answer:  \\[email or web address]\epic\Images\Pharmacy\HeparinInfusions\heparin LOW INTENSITY nomogram for [facility] [id number].pdf    05/03/24 0422     heparin 25,000 units in dextrose 5% 250 mL (100 units/mL) infusion LOW INTENSITY nomogram - [facility]  Continuous        Question:  Begin at (units/kg/hr)  Answer:  12    05/03/24 0422     IP VTE HIGH RISK PATIENT  Once         05/02/24 0324     Place sequential compression device  Until discontinued         05/02/24 0324                    Christina Kumar MD  Cardiology  [facility] - Intensive Care ([facility])

## 2024-05-03 NOTE — ASSESSMENT & PLAN NOTE
Patient's FSGs are controlled on current medication regimen.  Last A1c reviewed-   Lab Results   Component Value Date    HGBA1C 6.4 (H) 05/02/2024     Most recent fingerstick glucose reviewed-   Recent Labs   Lab 05/02/24  1245 05/02/24 2010   POCTGLUCOSE 174* 151*       Current correctional scale  Medium  Maintain anti-hyperglycemic dose as follows-   Antihyperglycemics (From admission, onward)    Start     Stop Route Frequency Ordered    05/02/24 2147  insulin aspart U-100 pen 0-10 Units         -- SubQ Before meals & nightly PRN 05/02/24 2047    05/02/24 0900  insulin detemir U-100 (Levemir) pen 11 Units         -- SubQ Daily 05/02/24 0435        Hold Oral hypoglycemics while patient is in the hospital.

## 2024-05-03 NOTE — PLAN OF CARE
NSR/SB  on the monitor. GCS of 15, a/o x4. Balloon pump removed without event and heparin DC. BG stable with coverage. 1L bolus of NS given. 1x morphine and xanax given for pain therapy. Plan of care discussed with patient and spouse at the bedside.

## 2024-05-03 NOTE — ASSESSMENT & PLAN NOTE
Chronic, controlled. Latest blood pressure and vitals reviewed-     Temp:  [97.8 °F (36.6 °C)-98.5 °F (36.9 °C)]   Pulse:  []   Resp:  [10-28]   BP: (113-169)/()   SpO2:  [92 %-100 %] .   Home meds for hypertension were reviewed and noted below.   Hypertension Medications               amLODIPine (NORVASC) 10 MG tablet Take 5 mg by mouth once daily.    metoprolol succinate (TOPROL-XL) 50 MG 24 hr tablet Take 50 mg by mouth every evening.            - titrate meds, as needed

## 2024-05-03 NOTE — PROGRESS NOTES
[facility] - Intensive Care ([facility])  Critical Care Medicine  Progress Note    Patient Name: Nahum Palacio  MRN: [id number]  Admission Date: 5/1/2024  Hospital Length of Stay: 1 days  Code Status: Full Code  Attending Provider: Eric Cameron MD  Primary Care Provider: Eric Foster MD   Principal Problem: NSTEMI (non-ST elevated myocardial infarction)    Subjective:     HPI:  Mr Palacio is a 72 y/o WM with CAD s/p CABG, DM, HTN, and HLD with statin intolerance who presented last night with chest and left arm/jaw pain.  Had been having pain on and off since November.  He was initially admitted to the Hospitalist service for work-up.  Troponin went to >50 on trending and LVEF was 30-35% on Echo, so he went to the cath lab this afternoon with Dr Kumar.  LHC showed mostly occluded native vessels with a patent lima to LAD and  SVG to PDA that was treated with stenting.  High filling pressures and moderate pulmonary hypertension, so IABP placed and he was given 40mg IV Lasix in the cath lab, as well.  He comes to the ICU post-procedure with the IABP, hence Critical Care consultation.    I examined him following arrival to the ICU.  He is awake and conversant.  Denies dyspnea, chest pain.  No numbness/tingling.  Distal circulation intact.  Otherwise without complaint.    Hospital/ICU Course:  5/2 - admitted to ICU post-cath with IABP in place.  Awake and conversant.  5/3 - lactic bumped overnight. CVL placed with acceptable ScvO2.  Plan for IABP out.    Interval History: Some hypotension overnight and lactic bumped slightly.  CVL placed with acceptable ScvO2 and lactic improved with small fluid bolus. Pt says he is tired, but otherwise without complaint.      Objective:     Vital Signs (Most Recent):  Temp: 98.5 °F (36.9 °C) (05/03/24 0330)  Pulse: 69 (05/03/24 1038)  Resp: 19 (05/03/24 1038)  BP: 130/79 (05/03/24 0646)  SpO2: (!) 94 % (05/03/24 1038) Vital Signs (24h Range):  Temp:  [97.8 °F (36.6 °C)-98.5 °F  (36.9 °C)] 98.5 °F (36.9 °C)  Pulse:  [] 69  Resp:  [10-28] 19  SpO2:  [92 %-100 %] 94 %  BP: (113-169)/() 130/79     Weight: 77.1 kg (170 lb)  Body mass index is 28.29 kg/m².      Intake/Output Summary (Last 24 hours) at 5/3/2024 1049  Last data filed at 5/3/2024 0600  Gross per 24 hour   Intake 1293.49 ml   Output 2960 ml   Net -1666.51 ml        Physical Exam  Vitals and nursing note reviewed.   Constitutional:       General: He is not in acute distress.  Cardiovascular:      Rate and Rhythm: Normal rate and regular rhythm.      Pulses: Normal pulses.      Heart sounds: Murmur heard.   Pulmonary:      Effort: Pulmonary effort is normal. No respiratory distress.      Breath sounds: No wheezing, rhonchi or rales.   Abdominal:      General: Abdomen is flat. There is no distension.      Palpations: Abdomen is soft.      Tenderness: There is no abdominal tenderness.   Musculoskeletal:      Right lower leg: No edema.      Left lower leg: No edema.      Comments: R groin IABP   Neurological:      General: No focal deficit present.      Mental Status: He is alert and oriented to person, place, and time. Mental status is at baseline.           Review of Systems    Vents:       Lines/Drains/Airways       Line  Duration                  IABP 05/02/24 1700 7.5 Fr. 40 mL <1 day              Peripheral Intravenous Line  Duration                  Peripheral IV - Single Lumen 05/01/24 2329 20 G Right Antecubital 1 day         Peripheral IV - Single Lumen 05/02/24 0315 20 G Left Antecubital 1 day                    Significant Labs:    CBC/Anemia Profile:  Recent Labs   Lab 05/01/24 2318 05/02/24  0322 05/03/24  0428   WBC 8.94 11.71 9.37   HGB 14.3 14.7 15.2   HCT 44.6 46.0 47.0    276 273   MCV 89 91 90   RDW 15.0* 14.9* 15.0*        Chemistries:  Recent Labs   Lab 05/01/24 2318 05/02/24  0949 05/03/24  0428    139 142   K 4.0 4.2 3.6    107 102   CO2 22* 17* 26   BUN 23 25* 22   CREATININE 1.8*  1.7* 1.7*   CALCIUM 9.4 9.2 9.1   ALBUMIN 3.6  --   --    PROT 7.4  --   --    BILITOT 0.4  --   --    ALKPHOS 79  --   --    ALT 17  --   --    AST 16  --   --    MG  --   --  1.8   PHOS  --   --  3.5       All pertinent labs within the past 24 hours have been reviewed.    Significant Imaging:  I have reviewed all pertinent imaging results/findings within the past 24 hours.    ABG  Recent Labs   Lab 05/03/24  1037   PH 7.444   PO2 32*   PCO2 42.4   HCO3 29.1*   BE 5*     Assessment/Plan:     Cardiac/Vascular  * NSTEMI (non-ST elevated myocardial infarction)  - Troponin peaked >50  - s/p LHC with stents to vein graft and IABP placement 5/2  - Cardiology following  - Cardiac monitoring    Acute exacerbation of CHF (congestive heart failure)  Patient is identified as having Systolic (HFrEF) heart failure that is Acute. CHF is currently uncontrolled due to Rales/crackles on pulmonary exam. Latest ECHO performed and demonstrates- Results for orders placed during the hospital encounter of 05/01/24    Echo    Interpretation Summary    Left Ventricle: The left ventricle is normal in size. Normal wall thickness. Regional wall motion abnormalities present. See diagram for wall motion findings. Septal motion is consistent with bundle branch block. There is moderately reduced systolic function with a visually estimated ejection fraction of 30 - 40%. Ejection fraction by visual approximation is 30%. Biplane (2D) method of discs ejection fraction is 26%. There is diastolic dysfunction but grade cannot be determined.    Right Ventricle: Normal right ventricular cavity size. Wall thickness is normal. Systolic function is normal.    Left Atrium: Left atrium is moderately dilated.    Mitral Valve: There is moderate regurgitation.    Pulmonary Artery: The estimated pulmonary artery systolic pressure is 31 mmHg.    IVC/SVC: Intermediate venous pressure at 8 mmHg.  . Continue Beta Blocker and monitor clinical status closely. Monitor on  telemetry. Patient is off CHF pathway.  Monitor strict Is&Os and daily weights.  Place on fluid restriction of 1.5 L. Cardiology has been consulted. Continue to stress to patient importance of self efficacy and  on diet for CHF. Last BNP reviewed- and noted below   Recent Labs   Lab 05/03/24  0944   *       - given 40mg IV Lasix in the cath lab  --- redosed overnight  - IABP in place 5/2; possibly out today  - Cardiology following  - CVL placed for CVP and SvO2 monitoring    Hypertension  Chronic, controlled. Latest blood pressure and vitals reviewed-     Temp:  [97.8 °F (36.6 °C)-98.5 °F (36.9 °C)]   Pulse:  []   Resp:  [10-28]   BP: (113-169)/()   SpO2:  [92 %-100 %] .   Home meds for hypertension were reviewed and noted below.   Hypertension Medications               amLODIPine (NORVASC) 10 MG tablet Take 5 mg by mouth once daily.    metoprolol succinate (TOPROL-XL) 50 MG 24 hr tablet Take 50 mg by mouth every evening.            - titrate meds, as needed    Coronary artery disease  Patient with known CAD s/p stent placement and CABG, which is uncontrolled Will continue ASA and Statin and monitor for S/Sx of angina/ACS. Continue to monitor on telemetry.     - s/p LHC 5/2 by Dr Kumar with stents to vein graft and IABP placed  - tirofiban infusion post-cath  - ASA/ticag oral  - cardiac monitoring  - Cardiology following  - plan for IABP out later today    Renal/  CKD (chronic kidney disease)  Creatine stable for now. BMP reviewed- noted Estimated Creatinine Clearance: 36 mL/min (A) (based on SCr of 1.7 mg/dL (H)). according to latest data. Based on current GFR, CKD stage is stage 3 - GFR 30-59.  Monitor UOP and serial BMP and adjust therapy as needed. Renally dose meds. Avoid nephrotoxic medications and procedures.    Endocrine  Hypothyroid  - continue Synthroid     Diabetes mellitus  Patient's FSGs are controlled on current medication regimen.  Last A1c reviewed-   Lab Results    Component Value Date    HGBA1C 6.4 (H) 05/02/2024     Most recent fingerstick glucose reviewed-   Recent Labs   Lab 05/02/24  1245 05/02/24 2010   POCTGLUCOSE 174* 151*       Current correctional scale  Medium  Maintain anti-hyperglycemic dose as follows-   Antihyperglycemics (From admission, onward)      Start     Stop Route Frequency Ordered    05/02/24 2147  insulin aspart U-100 pen 0-10 Units         -- SubQ Before meals & nightly PRN 05/02/24 2047 05/02/24 0900  insulin detemir U-100 (Levemir) pen 11 Units         -- SubQ Daily 05/02/24 0435          Hold Oral hypoglycemics while patient is in the hospital.      Critical Care Time: 40 minutes  Critical secondary to high risk monitoring      Critical care was time spent personally by me on the following activities: development of treatment plan with patient or surrogate and bedside caregivers, discussions with consultants, evaluation of patient's response to treatment, examination of patient, ordering and performing treatments and interventions, ordering and review of laboratory studies, ordering and review of radiographic studies, pulse oximetry, re-evaluation of patient's condition. This critical care time did not overlap with that of any other provider or involve time for any procedures.     Eric Cameron MD  Critical Care Medicine  '[facility] - Intensive Care ([facility])

## 2024-05-03 NOTE — ASSESSMENT & PLAN NOTE
-Patient with history of CAD s/p remote CABG who presents with L shoulder pain/CP and elevated troponin---consistent with NSTEMI  -Troponin 0.072>1.759>41.611, repeat pending  -EKG reviewed, ischemic changes with QRS widening in multiple leads  -Continue ASA, heparin gtt, CCB, BB  -Patient intolerant to statin  -R/LHC planned today pending creatinine trend    5/3/2024  Had svg to pda intervention no complication with IABP support.no angina optimal therapy on board

## 2024-05-03 NOTE — SUBJECTIVE & OBJECTIVE
Interval History: Some hypotension overnight and lactic bumped slightly.  CVL placed with acceptable ScvO2 and lactic improved with small fluid bolus. Pt says he is tired, but otherwise without complaint.      Objective:     Vital Signs (Most Recent):  Temp: 98.5 °F (36.9 °C) (05/03/24 0330)  Pulse: 69 (05/03/24 1038)  Resp: 19 (05/03/24 1038)  BP: 130/79 (05/03/24 0646)  SpO2: (!) 94 % (05/03/24 1038) Vital Signs (24h Range):  Temp:  [97.8 °F (36.6 °C)-98.5 °F (36.9 °C)] 98.5 °F (36.9 °C)  Pulse:  [] 69  Resp:  [10-28] 19  SpO2:  [92 %-100 %] 94 %  BP: (113-169)/() 130/79     Weight: 77.1 kg (170 lb)  Body mass index is 28.29 kg/m².      Intake/Output Summary (Last 24 hours) at 5/3/2024 1049  Last data filed at 5/3/2024 0600  Gross per 24 hour   Intake 1293.49 ml   Output 2960 ml   Net -1666.51 ml        Physical Exam  Vitals and nursing note reviewed.   Constitutional:       General: He is not in acute distress.  Cardiovascular:      Rate and Rhythm: Normal rate and regular rhythm.      Pulses: Normal pulses.      Heart sounds: Murmur heard.   Pulmonary:      Effort: Pulmonary effort is normal. No respiratory distress.      Breath sounds: No wheezing, rhonchi or rales.   Abdominal:      General: Abdomen is flat. There is no distension.      Palpations: Abdomen is soft.      Tenderness: There is no abdominal tenderness.   Musculoskeletal:      Right lower leg: No edema.      Left lower leg: No edema.      Comments: R groin IABP   Neurological:      General: No focal deficit present.      Mental Status: He is alert and oriented to person, place, and time. Mental status is at baseline.           Review of Systems    Vents:       Lines/Drains/Airways       Line  Duration                  IABP 05/02/24 1700 7.5 Fr. 40 mL <1 day              Peripheral Intravenous Line  Duration                  Peripheral IV - Single Lumen 05/01/24 2329 20 G Right Antecubital 1 day         Peripheral IV - Single Lumen  05/02/24 0315 20 G Left Antecubital 1 day                    Significant Labs:    CBC/Anemia Profile:  Recent Labs   Lab 05/01/24 2318 05/02/24  0322 05/03/24  0428   WBC 8.94 11.71 9.37   HGB 14.3 14.7 15.2   HCT 44.6 46.0 47.0    276 273   MCV 89 91 90   RDW 15.0* 14.9* 15.0*        Chemistries:  Recent Labs   Lab 05/01/24 2318 05/02/24  0949 05/03/24  0428    139 142   K 4.0 4.2 3.6    107 102   CO2 22* 17* 26   BUN 23 25* 22   CREATININE 1.8* 1.7* 1.7*   CALCIUM 9.4 9.2 9.1   ALBUMIN 3.6  --   --    PROT 7.4  --   --    BILITOT 0.4  --   --    ALKPHOS 79  --   --    ALT 17  --   --    AST 16  --   --    MG  --   --  1.8   PHOS  --   --  3.5       All pertinent labs within the past 24 hours have been reviewed.    Significant Imaging:  I have reviewed all pertinent imaging results/findings within the past 24 hours.

## 2024-05-04 LAB
ANION GAP SERPL CALC-SCNC: 8 MMOL/L (ref 8–16)
BASOPHILS # BLD AUTO: 0.04 K/UL (ref 0–0.2)
BASOPHILS NFR BLD: 0.5 % (ref 0–1.9)
BUN SERPL-MCNC: 25 MG/DL (ref 8–23)
CALCIUM SERPL-MCNC: 8.3 MG/DL (ref 8.7–10.5)
CHLORIDE SERPL-SCNC: 107 MMOL/L (ref 95–110)
CO2 SERPL-SCNC: 25 MMOL/L (ref 23–29)
CREAT SERPL-MCNC: 1.5 MG/DL (ref 0.5–1.4)
DIFFERENTIAL METHOD BLD: ABNORMAL
EOSINOPHIL # BLD AUTO: 0.3 K/UL (ref 0–0.5)
EOSINOPHIL NFR BLD: 3.9 % (ref 0–8)
ERYTHROCYTE [DISTWIDTH] IN BLOOD BY AUTOMATED COUNT: 15.1 % (ref 11.5–14.5)
EST. GFR  (NO RACE VARIABLE): 48 ML/MIN/1.73 M^2
GLUCOSE SERPL-MCNC: 126 MG/DL (ref 70–110)
HCT VFR BLD AUTO: 34.3 % (ref 40–54)
HGB BLD-MCNC: 11.1 G/DL (ref 14–18)
IMM GRANULOCYTES # BLD AUTO: 0.08 K/UL (ref 0–0.04)
IMM GRANULOCYTES NFR BLD AUTO: 0.9 % (ref 0–0.5)
LYMPHOCYTES # BLD AUTO: 1 K/UL (ref 1–4.8)
LYMPHOCYTES NFR BLD: 11.8 % (ref 18–48)
MCH RBC QN AUTO: 28.8 PG (ref 27–31)
MCHC RBC AUTO-ENTMCNC: 32.4 G/DL (ref 32–36)
MCV RBC AUTO: 89 FL (ref 82–98)
MONOCYTES # BLD AUTO: 1 K/UL (ref 0.3–1)
MONOCYTES NFR BLD: 11.4 % (ref 4–15)
NEUTROPHILS # BLD AUTO: 6.1 K/UL (ref 1.8–7.7)
NEUTROPHILS NFR BLD: 71.5 % (ref 38–73)
NRBC BLD-RTO: 0 /100 WBC
PLATELET # BLD AUTO: 188 K/UL (ref 150–450)
PMV BLD AUTO: 9.7 FL (ref 9.2–12.9)
POCT GLUCOSE: 128 MG/DL (ref 70–110)
POCT GLUCOSE: 227 MG/DL (ref 70–110)
POCT GLUCOSE: 238 MG/DL (ref 70–110)
POCT GLUCOSE: 243 MG/DL (ref 70–110)
POTASSIUM SERPL-SCNC: 3.7 MMOL/L (ref 3.5–5.1)
RBC # BLD AUTO: 3.86 M/UL (ref 4.6–6.2)
SODIUM SERPL-SCNC: 140 MMOL/L (ref 136–145)
WBC # BLD AUTO: 8.57 K/UL (ref 3.9–12.7)

## 2024-05-04 PROCEDURE — 80048 BASIC METABOLIC PNL TOTAL CA: CPT | Performed by: INTERNAL MEDICINE

## 2024-05-04 PROCEDURE — 25000003 PHARM REV CODE 250: Performed by: INTERNAL MEDICINE

## 2024-05-04 PROCEDURE — 99900035 HC TECH TIME PER 15 MIN (STAT)

## 2024-05-04 PROCEDURE — 97161 PT EVAL LOW COMPLEX 20 MIN: CPT

## 2024-05-04 PROCEDURE — 85025 COMPLETE CBC W/AUTO DIFF WBC: CPT | Performed by: INTERNAL MEDICINE

## 2024-05-04 PROCEDURE — 27000221 HC OXYGEN, UP TO 24 HOURS

## 2024-05-04 PROCEDURE — 20000000 HC ICU ROOM

## 2024-05-04 PROCEDURE — 25000003 PHARM REV CODE 250: Performed by: NURSE PRACTITIONER

## 2024-05-04 PROCEDURE — 97530 THERAPEUTIC ACTIVITIES: CPT

## 2024-05-04 PROCEDURE — 94761 N-INVAS EAR/PLS OXIMETRY MLT: CPT

## 2024-05-04 PROCEDURE — 93010 ELECTROCARDIOGRAM REPORT: CPT | Mod: ,,, | Performed by: INTERNAL MEDICINE

## 2024-05-04 PROCEDURE — 93005 ELECTROCARDIOGRAM TRACING: CPT

## 2024-05-04 PROCEDURE — 99233 SBSQ HOSP IP/OBS HIGH 50: CPT | Mod: ,,, | Performed by: INTERNAL MEDICINE

## 2024-05-04 RX ORDER — LOSARTAN POTASSIUM 50 MG/1
50 TABLET ORAL DAILY
Status: DISCONTINUED | OUTPATIENT
Start: 2024-05-05 | End: 2024-05-04

## 2024-05-04 RX ORDER — METOPROLOL SUCCINATE 50 MG/1
50 TABLET, EXTENDED RELEASE ORAL 2 TIMES DAILY
Status: DISCONTINUED | OUTPATIENT
Start: 2024-05-04 | End: 2024-05-08

## 2024-05-04 RX ORDER — SPIRONOLACTONE 25 MG/1
25 TABLET ORAL DAILY
Status: DISCONTINUED | OUTPATIENT
Start: 2024-05-04 | End: 2024-05-06

## 2024-05-04 RX ORDER — LOSARTAN POTASSIUM 50 MG/1
50 TABLET ORAL DAILY
Status: DISCONTINUED | OUTPATIENT
Start: 2024-05-04 | End: 2024-05-07

## 2024-05-04 RX ADMIN — TICAGRELOR 90 MG: 90 TABLET ORAL at 08:05

## 2024-05-04 RX ADMIN — MUPIROCIN: 20 OINTMENT TOPICAL at 08:05

## 2024-05-04 RX ADMIN — ISOSORBIDE MONONITRATE 30 MG: 30 TABLET, EXTENDED RELEASE ORAL at 08:05

## 2024-05-04 RX ADMIN — METOPROLOL SUCCINATE 50 MG: 50 TABLET, EXTENDED RELEASE ORAL at 09:05

## 2024-05-04 RX ADMIN — INSULIN ASPART 4 UNITS: 100 INJECTION, SOLUTION INTRAVENOUS; SUBCUTANEOUS at 05:05

## 2024-05-04 RX ADMIN — FERROUS SULFATE TAB 325 MG (65 MG ELEMENTAL FE) 1 EACH: 325 (65 FE) TAB at 08:05

## 2024-05-04 RX ADMIN — INSULIN ASPART 4 UNITS: 100 INJECTION, SOLUTION INTRAVENOUS; SUBCUTANEOUS at 12:05

## 2024-05-04 RX ADMIN — ASPIRIN 81 MG CHEWABLE TABLET 81 MG: 81 TABLET CHEWABLE at 08:05

## 2024-05-04 RX ADMIN — LEVOTHYROXINE SODIUM 50 MCG: 50 TABLET ORAL at 05:05

## 2024-05-04 RX ADMIN — METOPROLOL SUCCINATE 50 MG: 50 TABLET, EXTENDED RELEASE ORAL at 08:05

## 2024-05-04 RX ADMIN — INSULIN DETEMIR 11 UNITS: 100 INJECTION, SOLUTION SUBCUTANEOUS at 08:05

## 2024-05-04 RX ADMIN — SPIRONOLACTONE 25 MG: 25 TABLET, FILM COATED ORAL at 09:05

## 2024-05-04 RX ADMIN — TAMSULOSIN HYDROCHLORIDE 0.4 MG: 0.4 CAPSULE ORAL at 08:05

## 2024-05-04 RX ADMIN — LOSARTAN POTASSIUM 50 MG: 50 TABLET, FILM COATED ORAL at 09:05

## 2024-05-04 RX ADMIN — POTASSIUM BICARBONATE 50 MEQ: 978 TABLET, EFFERVESCENT ORAL at 05:05

## 2024-05-04 RX ADMIN — INSULIN ASPART 2 UNITS: 100 INJECTION, SOLUTION INTRAVENOUS; SUBCUTANEOUS at 08:05

## 2024-05-04 NOTE — PROGRESS NOTES
[facility] - [facility] ([facility])  Cardiology  Progress Note    Patient Name: Nahum Palacio  MRN: [id number]  Admission Date: 5/1/2024  Hospital Length of Stay: 2 days  Code Status: Full Code   Attending Physician: Eric Cameron MD   Primary Care Physician: Eric Foster MD  Expected Discharge Date:   Principal Problem:NSTEMI (non-ST elevated myocardial infarction)    Subjective:     Hospital Course:   5/3/2024 had svg to pda intervention with iabp support . His cr stable hct stable has no ischemia fly lower extremity. His lactic acid is [id number] dobutamine started. He received nitrates and losartan dropped his bp .iabp on 1:3   No chest pian or shortness of breath tolerated iabp well.    5/4/2024 feels better this morning denies any chf symptoms or angina . Cr 1.5 trending down.no groin hematoma. Bleeding from central line stopped.      Interval History: denies chest pain    Review of Systems   Constitutional: Positive for malaise/fatigue.   Eyes:  Negative for blurred vision.   Cardiovascular:  Negative for chest pain, claudication, cyanosis, dyspnea on exertion, irregular heartbeat, leg swelling, near-syncope, orthopnea, palpitations and paroxysmal nocturnal dyspnea.   Respiratory:  Negative for cough, hemoptysis and shortness of breath.    Hematologic/Lymphatic: Negative for bleeding problem. Does not bruise/bleed easily.   Skin:  Negative for dry skin and itching.   Musculoskeletal:  Negative for falls, muscle weakness and myalgias.   Gastrointestinal:  Negative for abdominal pain, diarrhea, heartburn, hematemesis, hematochezia and melena.   Genitourinary:  Negative for flank pain and hematuria.   Neurological:  Negative for dizziness, focal weakness, headaches, light-headedness, numbness, paresthesias, seizures and weakness.   Psychiatric/Behavioral:  Negative for altered mental status and memory loss. The patient is not nervous/anxious.    Allergic/Immunologic: Negative for hives.     Objective:      Vital Signs (Most Recent):  Temp: 98 °F (36.7 °C) (05/04/24 0700)  Pulse: 73 (05/04/24 0730)  Resp: 18 (05/04/24 0730)  BP: 137/80 (05/04/24 0846)  SpO2: 98 % (05/04/24 0730) Vital Signs (24h Range):  Temp:  [97.5 °F (36.4 °C)-98.3 °F (36.8 °C)] 98 °F (36.7 °C)  Pulse:  [69-85] 73  Resp:  [10-22] 18  SpO2:  [92 %-99 %] 98 %  BP: ()/(44-80) 137/80     Weight: 73.2 kg (161 lb 6 oz)  Body mass index is 26.85 kg/m².     SpO2: 98 %         Intake/Output Summary (Last 24 hours) at 5/4/2024 0858  Last data filed at 5/4/2024 0700  Gross per 24 hour   Intake 881.64 ml   Output 875 ml   Net 6.64 ml       Lines/Drains/Airways       Central Venous Catheter Line  Duration             Percutaneous Central Line - Triple Lumen  05/03/24 1030 Internal Jugular Right <1 day              Peripheral Intravenous Line  Duration                  Peripheral IV - Single Lumen 05/01/24 2329 20 G Right Antecubital 2 days         Peripheral IV - Single Lumen 05/02/24 0315 20 G Left Antecubital 2 days                       Physical Exam  Vitals and nursing note reviewed.   Constitutional:       General: He is not in acute distress.     Appearance: He is well-developed. He is not diaphoretic.   HENT:      Head: Normocephalic and atraumatic.   Eyes:      General:         Right eye: No discharge.         Left eye: No discharge.      Pupils: Pupils are equal, round, and reactive to light.   Neck:      Thyroid: No thyromegaly.      Vascular: No JVD.   Cardiovascular:      Rate and Rhythm: Normal rate and regular rhythm.      Pulses: Normal pulses and intact distal pulses.      Heart sounds: Murmur heard.      High-pitched blowing holosystolic murmur is present with a grade of 2/6 at the apex.      No friction rub. No gallop.   Pulmonary:      Effort: Pulmonary effort is normal. No respiratory distress.      Breath sounds: Normal breath sounds. No wheezing or rales.      Comments: Scar cabg well healed.  Chest:      Chest wall: No  tenderness.   Abdominal:      General: Bowel sounds are normal. There is no distension.      Palpations: Abdomen is soft.      Tenderness: There is no abdominal tenderness.   Musculoskeletal:         General: Normal range of motion.      Cervical back: Neck supple.      Right lower leg: No edema.      Left lower leg: No edema.   Skin:     General: Skin is warm and dry.      Findings: No erythema or rash.   Neurological:      Mental Status: He is alert and oriented to person, place, and time.      Cranial Nerves: No cranial nerve deficit.   Psychiatric:         Behavior: Behavior normal.            Significant Labs:   Recent Lab Results  (Last 5 results in the past 24 hours)        05/04/24  0843   05/04/24  0416   05/03/24  2100   05/03/24  2056   05/03/24  1732        Anion Gap   8             Baso #   0.04             Basophil %   0.5             BUN   25             Calcium   8.3             Chloride   107             CO2   25             Creatinine   1.5             Differential Method   Automated             eGFR   48             Eos #   0.3             Eos %   3.9             Glucose   126             Gran # (ANC)   6.1             Gran %   71.5             Hematocrit   34.3  Comment: Results confirmed, test repeated             Hemoglobin   11.1  Comment: Results confirmed, test repeated             Immature Grans (Abs)   0.08  Comment: Mild elevation in immature granulocytes is non specific and   can be seen in a variety of conditions including stress response,   acute inflammation, trauma and pregnancy. Correlation with other   laboratory and clinical findings is essential.               Immature Granulocytes   0.9             Lymph #   1.0             Lymph %   11.8             MCH   28.8             MCHC   32.4             MCV   89             Mono #   1.0             Mono %   11.4             MPV   9.7             nRBC   0             Platelet Count   188             POC Glucose     198           POCT  Glucose 128       198   168       Potassium   3.7             RBC   3.86             RDW   15.1             Sodium   140             WBC   8.57                                    Significant Imaging: X-Ray: CXR: X-Ray Chest 1 View (CXR): No results found for this visit on 05/01/24.  Assessment and Plan:     Brief HPI: a 74 yo male with cad s/p cabg presents with nstemi chf ckd had intervention of svg to pda with supported angioplasty his iabp was removed inotropes stopped medical therapy being adjusted.     * NSTEMI (non-ST elevated myocardial infarction)  -Patient with history of CAD s/p remote CABG who presents with L shoulder pain/CP and elevated troponin---consistent with NSTEMI  -Troponin 0.072>1.759>41.611, repeat pending  -EKG reviewed, ischemic changes with QRS widening in multiple leads  -Continue ASA, heparin gtt, CCB, BB  -Patient intolerant to statin  -R/LHC planned today pending creatinine trend    5/3/2024  Had svg to pda intervention no complication with IABP support.no angina optimal therapy on board    5/4/2024  Iabp removed no angina will adjust meds.      Acute combined systolic and diastolic congestive heart failure  Secondary to nstemi   Echo    Interpretation Summary    Left Ventricle: The left ventricle is normal in size. Normal wall thickness. Regional wall motion abnormalities present. See diagram for wall motion findings. Septal motion is consistent with bundle branch block. There is moderately reduced systolic function with a visually estimated ejection fraction of 30 - 40%. Ejection fraction by visual approximation is 30%. Biplane (2D) method of discs ejection fraction is 26%. There is diastolic dysfunction but grade cannot be determined.    Right Ventricle: Normal right ventricular cavity size. Wall thickness is normal. Systolic function is normal.    Left Atrium: Left atrium is moderately dilated.    Mitral Valve: There is moderate regurgitation.    Pulmonary Artery: The estimated  pulmonary artery systolic pressure is 31 mmHg.    IVC/SVC: Intermediate venous pressure at 8 mmHg.    Recent Labs   Lab 05/03/24  0944   *       Patient was diuresed revascularized inotropes started for lactic acidosis.     5/4/2024  Lactic acidosis resolved   Will stop inotropes.    Ischemic cardiomyopathy  See nstemi     Andie djust optimize medical therapy and wean off iabp.    5/4/2024   Iabp removed will increase afterload add aldactone.    CKD (chronic kidney disease)  -Creatinine 1.8, repeat pending    5/3/2024  Continue monitoring    5/4/2024  Cr improving    Acute exacerbation of CHF (congestive heart failure)  -BNP elevated and CXR with congestion  -Gently diurese  -Preliminary review of TTE with depressed EF, official report to follow  -Continue BB  -Will further optimize regimen as tolerated  -R/LHC tentatively planned today    5/4/2024   Resolved will discontinue inotropes.    Hypertension  -Continue CCB, BB  -Will optimize meds as tolerated    5/4/2024   Stop amlodipine increase b blockers and afterload.    Diabetes mellitus  -Mgmt as per hospital medicine    Coronary artery disease  -See plan under NSTEMI        VTE Risk Mitigation (From admission, onward)           Ordered     IP VTE HIGH RISK PATIENT  Once         05/02/24 0324     Place sequential compression device  Until discontinued         05/02/24 0324                    Christina Kumar MD  Cardiology  [facility] ([facility])

## 2024-05-04 NOTE — PLAN OF CARE
P.T. EVAL COMPLETE.  PT CURRENTLY REQUIRES MIN Awith sit <> stand transfers and up to MIN A for gait as pt had some LOB when using a RW for up to 15 ft. PT noted pt was mildly SOB but performed seated LAQ and standing heel raises with the RW for support up to 1x 10. Pt up in chair at end of session and encouraged to use the call button for return to bed.  P.T. RECOMMENDS low INTENSITY THERAPY UPON DISCHARGE

## 2024-05-04 NOTE — ASSESSMENT & PLAN NOTE
-BNP elevated and CXR with congestion  -Gently diurese  -Preliminary review of TTE with depressed EF, official report to follow  -Continue BB  -Will further optimize regimen as tolerated  -R/LHC tentatively planned today    5/4/2024   Resolved will discontinue inotropes.

## 2024-05-04 NOTE — PROGRESS NOTES
[facility]ensive [facility] ([facility])  Critical Care - Medicine  Progress Note    Patient Name: Nahum Palacio  MRN: [id number]  Admission Date: 5/1/2024  Hospital Length of Stay: 2 days  Code Status: Full Code  Attending Provider: Eric Cameron MD  Primary Care Provider: Eric Foster MD   Principal Problem: NSTEMI (non-ST elevated myocardial infarction)    Subjective:     HPI:   74 y/o WM with CAD s/p CABG, DM, HTN, and HLD with statin intolerance who presented last night with chest and left arm/jaw pain. Had been having pain on and off since November. He was initially admitted to the Hospitalist service for work-up. Troponin went to >50 on trending and LVEF was 30-35% on Echo, so he went to the cath lab this afternoon with Dr Kumar. LHC showed mostly occluded native vessels with a patent lima to LAD and SVG to PDA that was treated with stenting. High filling pressures and moderate pulmonary hypertension, so IABP placed and he was given 40mg IV Lasix in the cath lab, as well. He comes to the ICU post-procedure with the IABP, hence Critical Care consultation.     Hospital/ICU Course:   5/2 - admitted to ICU post-cath with IABP in place.  Awake and conversant.  5/3 - lactic bumped overnight. CVL placed with acceptable ScvO2.  Plan for IABP out    Interval History/Significant Events:   5/4: IABP out yesterday. No events overnight    Review of Systems otherwise negative  Objective:     Vital Signs (Most Recent):  Temp: 98 °F (36.7 °C) (05/04/24 0700)  Pulse: 73 (05/04/24 0730)  Resp: 18 (05/04/24 0730)  BP: 139/80 (05/04/24 0700)  SpO2: 98 % (05/04/24 0730) Vital Signs (24h Range):  Temp:  [97.5 °F (36.4 °C)-98.3 °F (36.8 °C)] 98 °F (36.7 °C)  Pulse:  [69-85] 73  Resp:  [10-22] 18  SpO2:  [92 %-99 %] 98 %  BP: ()/(44-83) 139/80     Weight: 73.2 kg (161 lb 6 oz)  Body mass index is 26.85 kg/m².      Intake/Output Summary (Last 24 hours) at 5/4/2024 0882  Last data filed at 5/4/2024 0700  Gross per 24 hour    Intake 881.64 ml   Output 875 ml   Net 6.64 ml       Physical Exam  GEN: NAD, alert  HEENT: no acute abnormality  NECK: IJ TLC in place  CHEST: coarse crackles no wheezing  HEART: regular with TIFFANIE  ABD: soft, NT  EXT: cool, no hematoma noted    Vents:       Lines/Drains/Airways       Central Venous Catheter Line  Duration             Percutaneous Central Line - Triple Lumen  05/03/24 1030 Internal Jugular Right <1 day              Peripheral Intravenous Line  Duration                  Peripheral IV - Single Lumen 05/01/24 2329 20 G Right Antecubital 2 days         Peripheral IV - Single Lumen 05/02/24 0315 20 G Left Antecubital 2 days                    Significant Labs:    CBC/Anemia Profile:  Recent Labs   Lab 05/03/24  0428 05/04/24  0416   WBC 9.37 8.57   HGB 15.2 11.1*   HCT 47.0 34.3*    188   MCV 90 89   RDW 15.0* 15.1*        Chemistries:  Recent Labs   Lab 05/02/24  0949 05/03/24  0428 05/04/24  0416    142 140   K 4.2 3.6 3.7    102 107   CO2 17* 26 25   BUN 25* 22 25*   CREATININE 1.7* 1.7* 1.5*   CALCIUM 9.2 9.1 8.3*   MG  --  1.8  --    PHOS  --  3.5  --        Assessment/Plan:     Active Diagnoses:    Diagnosis Date Noted POA    PRINCIPAL PROBLEM:  NSTEMI (non-ST elevated myocardial infarction) [I21.4] 05/02/2024 Yes    Ischemic cardiomyopathy [I25.5] 05/03/2024 No    Acute combined systolic and diastolic congestive heart failure [I50.41] 05/03/2024 Yes    Acute exacerbation of CHF (congestive heart failure) [I50.9] 05/02/2024 Yes    CKD (chronic kidney disease) [N18.9] 05/02/2024 Yes    Coronary artery disease [I25.10]  Yes    Diabetes mellitus [E11.9]  Yes    Hypertension [I10]  Yes    Hypothyroid [E03.9]  Yes      Problems Resolved During this Admission:       * NSTEMI (non-ST elevated myocardial infarction)  - Troponin peaked >50  - s/p LHC with stents to vein graft and IABP placement 5/2  - Cardiology following  - Cardiac monitoring  - Plan per cardiology  - Antiplatelet  therapy    Cardiogenic shock  - Resolving  - IABP out  - Diuresis ongoing  - CVL in place  - Not on pressors    CKD III  - Creatinine 1.5-1.7  - Monitor UOP    DM II with hyperglycemia and CAD  - Glycemic control with insulin  - Diabetic diet    Supportive care  OOB  Possible transfer out of ICU today pending cardiology eval       Critical care was time spent personally by me on the following activities: development of treatment plan with patient or surrogate and bedside caregivers, discussions with consultants, evaluation of patient's response to treatment, examination of patient, ordering and performing treatments and interventions, ordering and review of laboratory studies, ordering and review of radiographic studies, pulse oximetry, re-evaluation of patient's condition.  This critical care time did not overlap with that of any other provider or involve time for any procedures.     Jay Hernandez MD  Critical Care - Medicine  [facility] - [facility] ([facility])

## 2024-05-04 NOTE — PLAN OF CARE
Pt AAOx4. SPO2 97% on 4 L NC. HR NSR and BP normotensive. Voids per urinal with total UOP of 675 mL during this shift. POC reviewed with patient. Bed in lowest position, side rails up x 3, wheels locked, call light within reach and alarms on and audible.

## 2024-05-04 NOTE — ASSESSMENT & PLAN NOTE
-Patient with history of CAD s/p remote CABG who presents with L shoulder pain/CP and elevated troponin---consistent with NSTEMI  -Troponin 0.072>1.759>41.611, repeat pending  -EKG reviewed, ischemic changes with QRS widening in multiple leads  -Continue ASA, heparin gtt, CCB, BB  -Patient intolerant to statin  -R/LHC planned today pending creatinine trend    5/3/2024  Had svg to pda intervention no complication with IABP support.no angina optimal therapy on board    5/4/2024  Iabp removed no angina will adjust meds.

## 2024-05-04 NOTE — ASSESSMENT & PLAN NOTE
-Continue CCB, BB  -Will optimize meds as tolerated    5/4/2024   Stop amlodipine increase b blockers and afterload.

## 2024-05-04 NOTE — SUBJECTIVE & OBJECTIVE
Interval History: denies chest pain    Review of Systems   Constitutional: Positive for malaise/fatigue.   Eyes:  Negative for blurred vision.   Cardiovascular:  Negative for chest pain, claudication, cyanosis, dyspnea on exertion, irregular heartbeat, leg swelling, near-syncope, orthopnea, palpitations and paroxysmal nocturnal dyspnea.   Respiratory:  Negative for cough, hemoptysis and shortness of breath.    Hematologic/Lymphatic: Negative for bleeding problem. Does not bruise/bleed easily.   Skin:  Negative for dry skin and itching.   Musculoskeletal:  Negative for falls, muscle weakness and myalgias.   Gastrointestinal:  Negative for abdominal pain, diarrhea, heartburn, hematemesis, hematochezia and melena.   Genitourinary:  Negative for flank pain and hematuria.   Neurological:  Negative for dizziness, focal weakness, headaches, light-headedness, numbness, paresthesias, seizures and weakness.   Psychiatric/Behavioral:  Negative for altered mental status and memory loss. The patient is not nervous/anxious.    Allergic/Immunologic: Negative for hives.     Objective:     Vital Signs (Most Recent):  Temp: 98 °F (36.7 °C) (05/04/24 0700)  Pulse: 73 (05/04/24 0730)  Resp: 18 (05/04/24 0730)  BP: 137/80 (05/04/24 0846)  SpO2: 98 % (05/04/24 0730) Vital Signs (24h Range):  Temp:  [97.5 °F (36.4 °C)-98.3 °F (36.8 °C)] 98 °F (36.7 °C)  Pulse:  [69-85] 73  Resp:  [10-22] 18  SpO2:  [92 %-99 %] 98 %  BP: ()/(44-80) 137/80     Weight: 73.2 kg (161 lb 6 oz)  Body mass index is 26.85 kg/m².     SpO2: 98 %         Intake/Output Summary (Last 24 hours) at 5/4/2024 0858  Last data filed at 5/4/2024 0700  Gross per 24 hour   Intake 881.64 ml   Output 875 ml   Net 6.64 ml       Lines/Drains/Airways       Central Venous Catheter Line  Duration             Percutaneous Central Line - Triple Lumen  05/03/24 1030 Internal Jugular Right <1 day              Peripheral Intravenous Line  Duration                  Peripheral IV -  Single Lumen 05/01/24 2329 20 G Right Antecubital 2 days         Peripheral IV - Single Lumen 05/02/24 0315 20 G Left Antecubital 2 days                       Physical Exam  Vitals and nursing note reviewed.   Constitutional:       General: He is not in acute distress.     Appearance: He is well-developed. He is not diaphoretic.   HENT:      Head: Normocephalic and atraumatic.   Eyes:      General:         Right eye: No discharge.         Left eye: No discharge.      Pupils: Pupils are equal, round, and reactive to light.   Neck:      Thyroid: No thyromegaly.      Vascular: No JVD.   Cardiovascular:      Rate and Rhythm: Normal rate and regular rhythm.      Pulses: Normal pulses and intact distal pulses.      Heart sounds: Murmur heard.      High-pitched blowing holosystolic murmur is present with a grade of 2/6 at the apex.      No friction rub. No gallop.   Pulmonary:      Effort: Pulmonary effort is normal. No respiratory distress.      Breath sounds: Normal breath sounds. No wheezing or rales.      Comments: Scar cabg well healed.  Chest:      Chest wall: No tenderness.   Abdominal:      General: Bowel sounds are normal. There is no distension.      Palpations: Abdomen is soft.      Tenderness: There is no abdominal tenderness.   Musculoskeletal:         General: Normal range of motion.      Cervical back: Neck supple.      Right lower leg: No edema.      Left lower leg: No edema.   Skin:     General: Skin is warm and dry.      Findings: No erythema or rash.   Neurological:      Mental Status: He is alert and oriented to person, place, and time.      Cranial Nerves: No cranial nerve deficit.   Psychiatric:         Behavior: Behavior normal.            Significant Labs:   Recent Lab Results  (Last 5 results in the past 24 hours)        05/04/24  0843   05/04/24  0416   05/03/24  2100   05/03/24  2056   05/03/24  1732        Anion Gap   8             Baso #   0.04             Basophil %   0.5             BUN    25             Calcium   8.3             Chloride   107             CO2   25             Creatinine   1.5             Differential Method   Automated             eGFR   48             Eos #   0.3             Eos %   3.9             Glucose   126             Gran # (ANC)   6.1             Gran %   71.5             Hematocrit   34.3  Comment: Results confirmed, test repeated             Hemoglobin   11.1  Comment: Results confirmed, test repeated             Immature Grans (Abs)   0.08  Comment: Mild elevation in immature granulocytes is non specific and   can be seen in a variety of conditions including stress response,   acute inflammation, trauma and pregnancy. Correlation with other   laboratory and clinical findings is essential.               Immature Granulocytes   0.9             Lymph #   1.0             Lymph %   11.8             MCH   28.8             MCHC   32.4             MCV   89             Mono #   1.0             Mono %   11.4             MPV   9.7             nRBC   0             Platelet Count   188             POC Glucose     198           POCT Glucose 128       198   168       Potassium   3.7             RBC   3.86             RDW   15.1             Sodium   140             WBC   8.57                                    Significant Imaging: X-Ray: CXR: X-Ray Chest 1 View (CXR): No results found for this visit on 05/01/24.

## 2024-05-04 NOTE — ASSESSMENT & PLAN NOTE
See nstemi     Andie djust optimize medical therapy and wean off iabp.    5/4/2024   Iabp removed will increase afterload add aldactone.

## 2024-05-04 NOTE — PLAN OF CARE
NSR w/ stable vitals. GCS:15 PT was completed without event and no complaint of pain or SOB. All titrations/infusions dc. Pt voided 500 to urinal for shift. Cardiology plans to downgrade from ICU 5/5.

## 2024-05-04 NOTE — PT/OT/SLP EVAL
Physical Therapy Evaluation    Patient Name:  Nahum Palacio   MRN:  [id number]    Recommendations:     Discharge Recommendations: Low Intensity Therapy   Discharge Equipment Recommendations: walker, rolling   Barriers to discharge: None    Assessment:     Nahum Palacio is a 73 y.o. male admitted with a medical diagnosis of NSTEMI (non-ST elevated myocardial infarction).  He presents with the following impairments/functional limitations: weakness, impaired endurance, impaired self care skills, impaired functional mobility, gait instability, impaired balance, decreased safety awareness .    Rehab Prognosis: Good; patient would benefit from acute skilled PT services to address these deficits and reach maximum level of function.    Recent Surgery: Procedure(s) (LRB):  CATHETERIZATION, HEART, BOTH LEFT AND RIGHT (N/A)  ANGIOGRAM, ABDOMINAL AORTA  INSERTION, INTRA-AORTIC BALLOON PUMP  PTCA, Single Vessel  INSERTION, STENT, CORONARY ARTERY (N/A) 2 Days Post-Op    Plan:     During this hospitalization, patient to be seen 3 x/week to address the identified rehab impairments via gait training, therapeutic activities, therapeutic exercises, neuromuscular re-education and progress toward the following goals:    Plan of Care Expires:  05/18/24    Subjective     Chief Complaint: tired  Patient/Family Comments/goals: wants to be more functional and get stronger to return home.  Pain/Comfort:  Pain Rating 1: 0/10    Patients cultural, spiritual, [employer]ist conflicts given the current situation: yes    Living Environment:  Pt lives in a one story home with his spouse.   Prior to admission, patients level of function was I including driving and working in his wood shop on projects.  Equipment used at home: none.  DME owned (not currently used): none.  Upon discharge, patient will have assistance from spouse and will benefit from a RW.    Objective:     Communicated with GREGORIO Edmonds prior to session.  Patient found supine with  central line, telemetry, oxygen  upon PT entry to room.    General Precautions: Standard, fall  Orthopedic Precautions:N/A   Braces: N/A  Respiratory Status: Nasal cannula, flow 2 L/min    Exams:  LLE Strength: 4/5 grossly  RLE ROM: WFL  RLE Strength: 4/5 grossly  LLE ROM: WFL    Functional Mobility:  Bed Mobility:     Supine to Sit: minimum assistance  Sit to Supine: minimum assistance  Transfers:     Sit to Stand:  minimum assistance with rolling walker  Bed to Chair: minimum assistance with  rolling walker  using  Step Transfer  Gait: ambulated with RW with MIN A for 15 ft  Balance: dynamic gait fair      AM-PAC 6 CLICK MOBILITY  Total Score:16       Treatment & Education:  PT worked with pt on LAQ 1x 10, heel raises 1x 10, ambulating with RW to a chair (15 ft) with MIN A for safety due to mild Posterior LOB. PT encouraged call using call button to return to bed as not safe enough for self mobility yet. PT informed nurse as well.     Patient left up in chair with all lines intact, call button in reach, nurse notified, and spouse present.    GOALS:   Multidisciplinary Problems       Physical Therapy Goals          Problem: Physical Therapy    Goal Priority Disciplines Outcome Goal Variances Interventions   Physical Therapy Goal     PT, PT/OT      Description: LTG'S TO BE MET IN 14 DAYS (5/18/24)  PT WILL REQUIRE mod I FOR BED MOBILITY  PT WILL REQUIRE mod I FOR BED<>CHAIR TF'S  PT WILL  FEET WITH RW AND SBA  PT WILL INC AMPAC SCORE BY 2 POINTS TO PROGRESS GROSS FUNC MOBILITY                         History:     Past Medical History:   Diagnosis Date    CAD (coronary artery disease) of artery bypass graft     Diabetes mellitus     HLD (hyperlipidemia)     Hypertension     Statin intolerance        Past Surgical History:   Procedure Laterality Date    ARTHROPLASTY OF HIP BY ANTERIOR APPROACH Left 11/25/2022    Procedure: ARTHROPLASTY, HIP, TOTAL, ANTERIOR APPROACH;  Surgeon: Félix Ventura MD;  Location:  [facility] OR;  Service: Orthopedics;  Laterality: Left;    CORONARY ARTERY BYPASS GRAFT (CABG)         Time Tracking:     PT Received On: 05/04/24  PT Start Time: 1005     PT Stop Time: 1030  PT Total Time (min): 25 min     Billable Minutes: Evaluation 15 and Therapeutic Activity 10      05/04/2024

## 2024-05-04 NOTE — ASSESSMENT & PLAN NOTE
Secondary to nstemi   Echo    Interpretation Summary    Left Ventricle: The left ventricle is normal in size. Normal wall thickness. Regional wall motion abnormalities present. See diagram for wall motion findings. Septal motion is consistent with bundle branch block. There is moderately reduced systolic function with a visually estimated ejection fraction of 30 - 40%. Ejection fraction by visual approximation is 30%. Biplane (2D) method of discs ejection fraction is 26%. There is diastolic dysfunction but grade cannot be determined.    Right Ventricle: Normal right ventricular cavity size. Wall thickness is normal. Systolic function is normal.    Left Atrium: Left atrium is moderately dilated.    Mitral Valve: There is moderate regurgitation.    Pulmonary Artery: The estimated pulmonary artery systolic pressure is 31 mmHg.    IVC/SVC: Intermediate venous pressure at 8 mmHg.    Recent Labs   Lab 05/03/24  0944   *       Patient was diuresed revascularized inotropes started for lactic acidosis.     5/4/2024  Lactic acidosis resolved   Will stop inotropes.

## 2024-05-05 LAB
ALBUMIN SERPL BCP-MCNC: 3 G/DL (ref 3.5–5.2)
ALP SERPL-CCNC: 59 U/L (ref 55–135)
ALT SERPL W/O P-5'-P-CCNC: 14 U/L (ref 10–44)
ANION GAP SERPL CALC-SCNC: 9 MMOL/L (ref 8–16)
AST SERPL-CCNC: 21 U/L (ref 10–40)
BASOPHILS # BLD AUTO: 0.08 K/UL (ref 0–0.2)
BASOPHILS # BLD AUTO: 0.08 K/UL (ref 0–0.2)
BASOPHILS NFR BLD: 0.8 % (ref 0–1.9)
BASOPHILS NFR BLD: 0.8 % (ref 0–1.9)
BILIRUB SERPL-MCNC: 0.9 MG/DL (ref 0.1–1)
BNP SERPL-MCNC: 796 PG/ML (ref 0–99)
BUN SERPL-MCNC: 28 MG/DL (ref 8–23)
CALCIUM SERPL-MCNC: 9.1 MG/DL (ref 8.7–10.5)
CHLORIDE SERPL-SCNC: 104 MMOL/L (ref 95–110)
CO2 SERPL-SCNC: 23 MMOL/L (ref 23–29)
CREAT SERPL-MCNC: 1.6 MG/DL (ref 0.5–1.4)
DIFFERENTIAL METHOD BLD: ABNORMAL
DIFFERENTIAL METHOD BLD: ABNORMAL
EOSINOPHIL # BLD AUTO: 0.7 K/UL (ref 0–0.5)
EOSINOPHIL # BLD AUTO: 0.7 K/UL (ref 0–0.5)
EOSINOPHIL NFR BLD: 6.5 % (ref 0–8)
EOSINOPHIL NFR BLD: 6.5 % (ref 0–8)
ERYTHROCYTE [DISTWIDTH] IN BLOOD BY AUTOMATED COUNT: 14.8 % (ref 11.5–14.5)
ERYTHROCYTE [DISTWIDTH] IN BLOOD BY AUTOMATED COUNT: 14.8 % (ref 11.5–14.5)
EST. GFR  (NO RACE VARIABLE): 45 ML/MIN/1.73 M^2
GLUCOSE SERPL-MCNC: 174 MG/DL (ref 70–110)
HCT VFR BLD AUTO: 39.8 % (ref 40–54)
HCT VFR BLD AUTO: 39.8 % (ref 40–54)
HGB BLD-MCNC: 13.1 G/DL (ref 14–18)
HGB BLD-MCNC: 13.1 G/DL (ref 14–18)
IMM GRANULOCYTES # BLD AUTO: 0.23 K/UL (ref 0–0.04)
IMM GRANULOCYTES # BLD AUTO: 0.23 K/UL (ref 0–0.04)
IMM GRANULOCYTES NFR BLD AUTO: 2.2 % (ref 0–0.5)
IMM GRANULOCYTES NFR BLD AUTO: 2.2 % (ref 0–0.5)
LYMPHOCYTES # BLD AUTO: 1.3 K/UL (ref 1–4.8)
LYMPHOCYTES # BLD AUTO: 1.3 K/UL (ref 1–4.8)
LYMPHOCYTES NFR BLD: 12.2 % (ref 18–48)
LYMPHOCYTES NFR BLD: 12.2 % (ref 18–48)
MCH RBC QN AUTO: 28.9 PG (ref 27–31)
MCH RBC QN AUTO: 28.9 PG (ref 27–31)
MCHC RBC AUTO-ENTMCNC: 32.9 G/DL (ref 32–36)
MCHC RBC AUTO-ENTMCNC: 32.9 G/DL (ref 32–36)
MCV RBC AUTO: 88 FL (ref 82–98)
MCV RBC AUTO: 88 FL (ref 82–98)
MONOCYTES # BLD AUTO: 1 K/UL (ref 0.3–1)
MONOCYTES # BLD AUTO: 1 K/UL (ref 0.3–1)
MONOCYTES NFR BLD: 9.6 % (ref 4–15)
MONOCYTES NFR BLD: 9.6 % (ref 4–15)
NEUTROPHILS # BLD AUTO: 7.3 K/UL (ref 1.8–7.7)
NEUTROPHILS # BLD AUTO: 7.3 K/UL (ref 1.8–7.7)
NEUTROPHILS NFR BLD: 68.7 % (ref 38–73)
NEUTROPHILS NFR BLD: 68.7 % (ref 38–73)
NRBC BLD-RTO: 0 /100 WBC
NRBC BLD-RTO: 0 /100 WBC
OHS QRS DURATION: 114 MS
OHS QTC CALCULATION: 472 MS
PLATELET # BLD AUTO: 224 K/UL (ref 150–450)
PLATELET # BLD AUTO: 224 K/UL (ref 150–450)
PMV BLD AUTO: 9.8 FL (ref 9.2–12.9)
PMV BLD AUTO: 9.8 FL (ref 9.2–12.9)
POCT GLUCOSE: 177 MG/DL (ref 70–110)
POCT GLUCOSE: 184 MG/DL (ref 70–110)
POCT GLUCOSE: 187 MG/DL (ref 70–110)
POCT GLUCOSE: 223 MG/DL (ref 70–110)
POTASSIUM SERPL-SCNC: 4.2 MMOL/L (ref 3.5–5.1)
PROT SERPL-MCNC: 6.6 G/DL (ref 6–8.4)
RBC # BLD AUTO: 4.54 M/UL (ref 4.6–6.2)
RBC # BLD AUTO: 4.54 M/UL (ref 4.6–6.2)
SODIUM SERPL-SCNC: 136 MMOL/L (ref 136–145)
WBC # BLD AUTO: 10.54 K/UL (ref 3.9–12.7)
WBC # BLD AUTO: 10.54 K/UL (ref 3.9–12.7)

## 2024-05-05 PROCEDURE — 85025 COMPLETE CBC W/AUTO DIFF WBC: CPT | Performed by: INTERNAL MEDICINE

## 2024-05-05 PROCEDURE — 25000003 PHARM REV CODE 250: Performed by: NURSE PRACTITIONER

## 2024-05-05 PROCEDURE — 63600175 PHARM REV CODE 636 W HCPCS: Performed by: INTERNAL MEDICINE

## 2024-05-05 PROCEDURE — 25000003 PHARM REV CODE 250: Performed by: INTERNAL MEDICINE

## 2024-05-05 PROCEDURE — 21400001 HC TELEMETRY ROOM

## 2024-05-05 PROCEDURE — 94761 N-INVAS EAR/PLS OXIMETRY MLT: CPT

## 2024-05-05 PROCEDURE — 27000221 HC OXYGEN, UP TO 24 HOURS

## 2024-05-05 PROCEDURE — 99233 SBSQ HOSP IP/OBS HIGH 50: CPT | Mod: ,,, | Performed by: INTERNAL MEDICINE

## 2024-05-05 PROCEDURE — 83880 ASSAY OF NATRIURETIC PEPTIDE: CPT | Performed by: INTERNAL MEDICINE

## 2024-05-05 PROCEDURE — 80053 COMPREHEN METABOLIC PANEL: CPT | Performed by: INTERNAL MEDICINE

## 2024-05-05 RX ORDER — FUROSEMIDE 10 MG/ML
20 INJECTION INTRAMUSCULAR; INTRAVENOUS ONCE
Status: COMPLETED | OUTPATIENT
Start: 2024-05-05 | End: 2024-05-05

## 2024-05-05 RX ORDER — FUROSEMIDE 20 MG/1
20 TABLET ORAL DAILY
Status: DISCONTINUED | OUTPATIENT
Start: 2024-05-06 | End: 2024-05-07

## 2024-05-05 RX ORDER — GUAIFENESIN 100 MG/5ML
200 SOLUTION ORAL EVERY 4 HOURS PRN
Status: DISCONTINUED | OUTPATIENT
Start: 2024-05-05 | End: 2024-05-08 | Stop reason: HOSPADM

## 2024-05-05 RX ADMIN — GUAIFENESIN 200 MG: 200 SOLUTION ORAL at 10:05

## 2024-05-05 RX ADMIN — INSULIN ASPART 1 UNITS: 100 INJECTION, SOLUTION INTRAVENOUS; SUBCUTANEOUS at 08:05

## 2024-05-05 RX ADMIN — METOPROLOL SUCCINATE 50 MG: 50 TABLET, EXTENDED RELEASE ORAL at 08:05

## 2024-05-05 RX ADMIN — TICAGRELOR 90 MG: 90 TABLET ORAL at 08:05

## 2024-05-05 RX ADMIN — SPIRONOLACTONE 25 MG: 25 TABLET, FILM COATED ORAL at 08:05

## 2024-05-05 RX ADMIN — INSULIN DETEMIR 11 UNITS: 100 INJECTION, SOLUTION SUBCUTANEOUS at 08:05

## 2024-05-05 RX ADMIN — LEVOTHYROXINE SODIUM 50 MCG: 50 TABLET ORAL at 05:05

## 2024-05-05 RX ADMIN — LOSARTAN POTASSIUM 50 MG: 50 TABLET, FILM COATED ORAL at 08:05

## 2024-05-05 RX ADMIN — FUROSEMIDE 20 MG: 10 INJECTION, SOLUTION INTRAMUSCULAR; INTRAVENOUS at 04:05

## 2024-05-05 RX ADMIN — MUPIROCIN: 20 OINTMENT TOPICAL at 08:05

## 2024-05-05 RX ADMIN — INSULIN ASPART 2 UNITS: 100 INJECTION, SOLUTION INTRAVENOUS; SUBCUTANEOUS at 05:05

## 2024-05-05 RX ADMIN — TAMSULOSIN HYDROCHLORIDE 0.4 MG: 0.4 CAPSULE ORAL at 08:05

## 2024-05-05 RX ADMIN — ASPIRIN 81 MG CHEWABLE TABLET 81 MG: 81 TABLET CHEWABLE at 08:05

## 2024-05-05 RX ADMIN — INSULIN ASPART 2 UNITS: 100 INJECTION, SOLUTION INTRAVENOUS; SUBCUTANEOUS at 08:05

## 2024-05-05 RX ADMIN — INSULIN ASPART 4 UNITS: 100 INJECTION, SOLUTION INTRAVENOUS; SUBCUTANEOUS at 12:05

## 2024-05-05 RX ADMIN — FERROUS SULFATE TAB 325 MG (65 MG ELEMENTAL FE) 1 EACH: 325 (65 FE) TAB at 08:05

## 2024-05-05 NOTE — ASSESSMENT & PLAN NOTE
-Patient with history of CAD s/p remote CABG who presents with L shoulder pain/CP and elevated troponin---consistent with NSTEMI  -Troponin 0.072>1.759>41.611, repeat pending  -EKG reviewed, ischemic changes with QRS widening in multiple leads  -Continue ASA, heparin gtt, CCB, BB  -Patient intolerant to statin  -R/LHC planned today pending creatinine trend    5/3/2024  Had svg to pda intervention no complication with IABP support.no angina optimal therapy on board    5/4/2024  Iabp removed no angina will adjust meds.    5/5/2024 NO ANGINA

## 2024-05-05 NOTE — PLAN OF CARE
Pt SR/SB and afebrile w/o fluids. Up and out of bed with minimal assistance and no complaint of pain. BG is stable with coverage. Plan of downgrade discussed with pt and wife at the bedside.

## 2024-05-05 NOTE — SUBJECTIVE & OBJECTIVE
Interval History: DENIES ANY CARDIAC SYMPTOMS    Review of Systems   Constitutional: Positive for malaise/fatigue.   Eyes:  Negative for blurred vision.   Cardiovascular:  Negative for chest pain, claudication, cyanosis, dyspnea on exertion, irregular heartbeat, leg swelling, near-syncope, orthopnea, palpitations and paroxysmal nocturnal dyspnea.   Respiratory:  Negative for cough, hemoptysis and shortness of breath.    Hematologic/Lymphatic: Negative for bleeding problem. Does not bruise/bleed easily.   Skin:  Negative for dry skin and itching.   Musculoskeletal:  Negative for falls, muscle weakness and myalgias.   Gastrointestinal:  Negative for abdominal pain, diarrhea, heartburn, hematemesis, hematochezia and melena.   Genitourinary:  Negative for flank pain and hematuria.   Neurological:  Negative for dizziness, focal weakness, headaches, light-headedness, numbness, paresthesias, seizures and weakness.   Psychiatric/Behavioral:  Negative for altered mental status and memory loss. The patient is not nervous/anxious.    Allergic/Immunologic: Negative for hives.     Objective:     Vital Signs (Most Recent):  Temp: 98.3 °F (36.8 °C) (05/05/24 1100)  Pulse: 62 (05/05/24 1045)  Resp: 19 (05/05/24 1045)  BP: (!) 110/59 (05/05/24 1100)  SpO2: 97 % (05/05/24 1045) Vital Signs (24h Range):  Temp:  [98 °F (36.7 °C)-98.5 °F (36.9 °C)] 98.3 °F (36.8 °C)  Pulse:  [62-80] 62  Resp:  [13-28] 19  SpO2:  [93 %-98 %] 97 %  BP: (110-147)/(57-85) 110/59     Weight: 71.1 kg (156 lb 12 oz)  Body mass index is 26.08 kg/m².     SpO2: 97 %         Intake/Output Summary (Last 24 hours) at 5/5/2024 1420  Last data filed at 5/5/2024 0800  Gross per 24 hour   Intake --   Output 1230 ml   Net -1230 ml       Lines/Drains/Airways       Central Venous Catheter Line  Duration             Percutaneous Central Line - Triple Lumen  05/03/24 1030 Internal Jugular Right 2 days              Peripheral Intravenous Line  Duration                   Peripheral IV - Single Lumen 05/01/24 2329 20 G Right Antecubital 3 days         Peripheral IV - Single Lumen 05/02/24 0315 20 G Left Antecubital 3 days                       Physical Exam  Vitals and nursing note reviewed.   Constitutional:       General: He is not in acute distress.     Appearance: He is well-developed. He is not diaphoretic.   HENT:      Head: Normocephalic and atraumatic.   Eyes:      General:         Right eye: No discharge.         Left eye: No discharge.      Pupils: Pupils are equal, round, and reactive to light.   Neck:      Thyroid: No thyromegaly.      Vascular: No carotid bruit or JVD.   Cardiovascular:      Rate and Rhythm: Normal rate and regular rhythm.      Pulses: Normal pulses and intact distal pulses.      Heart sounds: Murmur heard.      High-pitched blowing holosystolic murmur is present with a grade of 2/6 at the apex.      No friction rub. No gallop.   Pulmonary:      Effort: Pulmonary effort is normal. No respiratory distress.      Breath sounds: Normal breath sounds. No wheezing or rales.   Chest:      Chest wall: No tenderness.   Abdominal:      General: Bowel sounds are normal. There is no distension.      Palpations: Abdomen is soft.      Tenderness: There is no abdominal tenderness.   Musculoskeletal:         General: Normal range of motion.      Cervical back: Neck supple.      Right lower leg: No edema.      Left lower leg: No edema.   Skin:     General: Skin is warm and dry.      Findings: No erythema or rash.   Neurological:      General: No focal deficit present.      Mental Status: He is alert and oriented to person, place, and time.      Cranial Nerves: No cranial nerve deficit.   Psychiatric:         Mood and Affect: Mood normal.         Behavior: Behavior normal.            Significant Labs:   Recent Lab Results  (Last 5 results in the past 24 hours)        05/05/24  1220   05/05/24  0818   05/05/24  0442   05/04/24  2015   05/04/24  1707        Albumin      3.0           ALP     59           ALT     14           Anion Gap     9           AST     21           Baso #     0.08                0.08           Basophil %     0.8                0.8           BILIRUBIN TOTAL     0.9  Comment: For infants and newborns, interpretation of results should be based  on gestational age, weight and in agreement with clinical  observations.    Premature Infant recommended reference ranges:  Up to 24 hours.............<8.0 mg/dL  Up to 48 hours............<12.0 mg/dL  3-5 days..................<15.0 mg/dL  6-29 days.................<15.0 mg/dL             BNP     796  Comment: Values of less than 100 pg/ml are consistent with non-CHF populations.           BUN     28           Calcium     9.1           Chloride     104           CO2     23           Creatinine     1.6           Differential Method     Automated                Automated           eGFR     45           Eos #     0.7                0.7           Eos %     6.5                6.5           Glucose     174           Gran # (ANC)     7.3                7.3           Gran %     68.7                68.7           Hematocrit     39.8                39.8           Hemoglobin     13.1                13.1           Immature Grans (Abs)     0.23  Comment: Mild elevation in immature granulocytes is non specific and   can be seen in a variety of conditions including stress response,   acute inflammation, trauma and pregnancy. Correlation with other   laboratory and clinical findings is essential.                  0.23  Comment: Mild elevation in immature granulocytes is non specific and   can be seen in a variety of conditions including stress response,   acute inflammation, trauma and pregnancy. Correlation with other   laboratory and clinical findings is essential.             Immature Granulocytes     2.2                2.2           Lymph #     1.3                1.3           Lymph %     12.2                12.2           MCH      28.9                28.9           MCHC     32.9                32.9           MCV     88                88           Mono #     1.0                1.0           Mono %     9.6                9.6           MPV     9.8                9.8           nRBC     0                0           Platelet Count     224                224           POCT Glucose 223   184     238   227       Potassium     4.2           PROTEIN TOTAL     6.6           RBC     4.54                4.54           RDW     14.8                14.8           Sodium     136           WBC     10.54                10.54                                  Significant Imaging: X-Ray: CXR: Narrative & Impression  EXAMINATION:  XR CHEST AP PORTABLE     CLINICAL HISTORY:  nstemi;     COMPARISON:  May 3, 2024     FINDINGS:  EKG leads and oxygen tubing overlie the chest.  Stable right jugular central line without pneumothorax.  Stable left lower lobe scarring or atelectasis.  The lungs are free of new pulmonary opacities.  The hilar and mediastinal contours and osseous structures are unchanged.     Impression:     1.  Overall, no significant interval change has occurred.     2.  Incidental findings as noted above.        Electronically signed by:Ortiz Gary MD  Date:                                            05/05/2024  Time:                                           08:22

## 2024-05-05 NOTE — ASSESSMENT & PLAN NOTE
See nstemi     Andie djust optimize medical therapy and wean off iabp.    5/4/2024   Iabp removed will increase afterload add aldactone.  5/5/2024   IS ON AFTERLOAD ALDACTONE AND B BLOCKERS. ANDIE DD LOW DOSE DIURETICS

## 2024-05-05 NOTE — ASSESSMENT & PLAN NOTE
-Mgmt as per hospital medicine    5/5/2024 WILL BENEFIT FROM FARXIGA WILL WATCH RENAL FUNCTION FIRST.

## 2024-05-05 NOTE — PROGRESS NOTES
[facility] ([facility])  Critical Care - Medicine  Progress Note    Patient Name: Nahum Palacio  MRN: [id number]  Admission Date: 5/1/2024  Hospital Length of Stay: 3 days  Code Status: Full Code  Attending Provider: Eric Cameron MD  Primary Care Provider: Eric Foster MD   Principal Problem: NSTEMI (non-ST elevated myocardial infarction)    Subjective:     HPI: 74 y/o WM with CAD s/p CABG, DM, HTN, and HLD with statin intolerance who presented last night with chest and left arm/jaw pain. Had been having pain on and off since November. He was initially admitted to the Hospitalist service for work-up. Troponin went to >50 on trending and LVEF was 30-35% on Echo, so he went to the cath lab this afternoon with Dr Kumar. LHC showed mostly occluded native vessels with a patent lima to LAD and SVG to PDA that was treated with stenting. High filling pressures and moderate pulmonary hypertension, so IABP placed and he was given 40mg IV Lasix in the cath lab, as well. He comes to the ICU post-procedure with the IABP, hence Critical Care consultation.     Hospital/ICU Course:   5/2 - admitted to ICU post-cath with IABP in place.  Awake and conversant.  5/3 - lactic bumped overnight. CVL placed with acceptable ScvO2.  Plan for IABP out  5/4: IABP out yesterday. No events overnight     Interval History/Significant Events:   5/5: Alert, hemodynamically stable. Denies chest pain and dyspnea    Review of Systems as per HPI  Objective:     Vital Signs (Most Recent):  Temp: 98 °F (36.7 °C) (05/05/24 0700)  Pulse: 71 (05/05/24 0811)  Resp: 20 (05/05/24 0746)  BP: 134/82 (05/05/24 0811)  SpO2: (!) 94 % (05/05/24 0746) Vital Signs (24h Range):  Temp:  [98 °F (36.7 °C)-98.5 °F (36.9 °C)] 98 °F (36.7 °C)  Pulse:  [64-80] 71  Resp:  [13-28] 20  SpO2:  [93 %-97 %] 94 %  BP: (110-147)/(57-85) 134/82     Weight: 71.1 kg (156 lb 12 oz)  Body mass index is 26.08 kg/m².      Intake/Output Summary (Last 24 hours) at  5/5/2024 0936  Last data filed at 5/5/2024 0700  Gross per 24 hour   Intake 2.65 ml   Output 1190 ml   Net -1187.35 ml       Physical Exam  GEN: NAD, alert  HEENT: no acute abnormality  NECK: IJ TLC in place  CHEST: coarse crackles no wheezing  HEART: regular with TIFFANIE  ABD: soft, NT  EXT: warm, no hematoma noted    Vents:       Lines/Drains/Airways       Central Venous Catheter Line  Duration             Percutaneous Central Line - Triple Lumen  05/03/24 1030 Internal Jugular Right 1 day              Peripheral Intravenous Line  Duration                  Peripheral IV - Single Lumen 05/01/24 2329 20 G Right Antecubital 3 days         Peripheral IV - Single Lumen 05/02/24 0315 20 G Left Antecubital 3 days                    Significant Labs:    CBC/Anemia Profile:  Recent Labs   Lab 05/04/24 0416 05/05/24 0442   WBC 8.57 10.54  10.54   HGB 11.1* 13.1*  13.1*   HCT 34.3* 39.8*  39.8*    224  224   MCV 89 88  88   RDW 15.1* 14.8*  14.8*        Chemistries:  Recent Labs   Lab 05/04/24 0416 05/05/24 0442    136   K 3.7 4.2    104   CO2 25 23   BUN 25* 28*   CREATININE 1.5* 1.6*   CALCIUM 8.3* 9.1   ALBUMIN  --  3.0*   PROT  --  6.6   BILITOT  --  0.9   ALKPHOS  --  59   ALT  --  14   AST  --  21       Assessment/Plan:     Active Diagnoses:    Diagnosis Date Noted POA    PRINCIPAL PROBLEM:  NSTEMI (non-ST elevated myocardial infarction) [I21.4] 05/02/2024 Yes    Ischemic cardiomyopathy [I25.5] 05/03/2024 No    Acute combined systolic and diastolic congestive heart failure [I50.41] 05/03/2024 Yes    Acute exacerbation of CHF (congestive heart failure) [I50.9] 05/02/2024 Yes    CKD (chronic kidney disease) [N18.9] 05/02/2024 Yes    Coronary artery disease [I25.10]  Yes    Diabetes mellitus [E11.9]  Yes    Hypertension [I10]  Yes    Hypothyroid [E03.9]  Yes      Problems Resolved During this Admission:       * NSTEMI (non-ST elevated myocardial infarction)  - Troponin peaked >50  - s/p C with  stents to vein graft and IABP placement 5/2  - Cardiology following  - Cardiac monitoring  - Plan per cardiology  - Antiplatelet therapy     Cardiogenic shock  - Resolving  - IABP out  - Diuresis ongoing  - CVL in place  - Not on pressors     CKD III  - Creatinine 1.5-1.7  - Monitor UOP     DM II with hyperglycemia and CAD  - Glycemic control with insulin  - Diabetic diet     Supportive care  OOB  Stable for transfer out of ICU  HMS notified     Critical care was time spent personally by me on the following activities: development of treatment plan with patient or surrogate and bedside caregivers, discussions with consultants, evaluation of patient's response to treatment, examination of patient, ordering and performing treatments and interventions, ordering and review of laboratory studies, ordering and review of radiographic studies, pulse oximetry, re-evaluation of patient's condition.  This critical care time did not overlap with that of any other provider or involve time for any procedures.     Jay Hernandez MD  Critical Care - Medicine  [facility] ([facility])

## 2024-05-05 NOTE — PROGRESS NOTES
[facility] ([facility])  Cardiology  Progress Note    Patient Name: Nahum Palacio  MRN: [id number]  Admission Date: 5/1/2024  Hospital Length of Stay: 3 days  Code Status: Full Code   Attending Physician: Eric Cameron MD   Primary Care Physician: Eric Foster MD  Expected Discharge Date:   Principal Problem:NSTEMI (non-ST elevated myocardial infarction)    Subjective:     Hospital Course:   5/3/2024 had svg to pda intervention with iabp support . His cr stable hct stable has no ischemia fly lower extremity. His lactic acid is [id number] dobutamine started. He received nitrates and losartan dropped his bp .iabp on 1:3   No chest pian or shortness of breath tolerated iabp well.    5/4/2024 feels better this morning denies any chf symptoms or angina . Cr 1.5 trending down.no groin hematoma. Bleeding from central line stopped.    5/5/2024 HE FEELS WELL THIS MORNING. HE AHS NO CHEST PAIN OR SHORTNESS OF BREATH HIS CR 1.6.HIS BNP IS MILDLY INCREASED .CVP 8     Interval History: DENIES ANY CARDIAC SYMPTOMS    Review of Systems   Constitutional: Positive for malaise/fatigue.   Eyes:  Negative for blurred vision.   Cardiovascular:  Negative for chest pain, claudication, cyanosis, dyspnea on exertion, irregular heartbeat, leg swelling, near-syncope, orthopnea, palpitations and paroxysmal nocturnal dyspnea.   Respiratory:  Negative for cough, hemoptysis and shortness of breath.    Hematologic/Lymphatic: Negative for bleeding problem. Does not bruise/bleed easily.   Skin:  Negative for dry skin and itching.   Musculoskeletal:  Negative for falls, muscle weakness and myalgias.   Gastrointestinal:  Negative for abdominal pain, diarrhea, heartburn, hematemesis, hematochezia and melena.   Genitourinary:  Negative for flank pain and hematuria.   Neurological:  Negative for dizziness, focal weakness, headaches, light-headedness, numbness, paresthesias, seizures and weakness.   Psychiatric/Behavioral:  Negative  for altered mental status and memory loss. The patient is not nervous/anxious.    Allergic/Immunologic: Negative for hives.     Objective:     Vital Signs (Most Recent):  Temp: 98.3 °F (36.8 °C) (05/05/24 1100)  Pulse: 62 (05/05/24 1045)  Resp: 19 (05/05/24 1045)  BP: (!) 110/59 (05/05/24 1100)  SpO2: 97 % (05/05/24 1045) Vital Signs (24h Range):  Temp:  [98 °F (36.7 °C)-98.5 °F (36.9 °C)] 98.3 °F (36.8 °C)  Pulse:  [62-80] 62  Resp:  [13-28] 19  SpO2:  [93 %-98 %] 97 %  BP: (110-147)/(57-85) 110/59     Weight: 71.1 kg (156 lb 12 oz)  Body mass index is 26.08 kg/m².     SpO2: 97 %         Intake/Output Summary (Last 24 hours) at 5/5/2024 1420  Last data filed at 5/5/2024 0800  Gross per 24 hour   Intake --   Output 1230 ml   Net -1230 ml       Lines/Drains/Airways       Central Venous Catheter Line  Duration             Percutaneous Central Line - Triple Lumen  05/03/24 1030 Internal Jugular Right 2 days              Peripheral Intravenous Line  Duration                  Peripheral IV - Single Lumen 05/01/24 2329 20 G Right Antecubital 3 days         Peripheral IV - Single Lumen 05/02/24 0315 20 G Left Antecubital 3 days                       Physical Exam  Vitals and nursing note reviewed.   Constitutional:       General: He is not in acute distress.     Appearance: He is well-developed. He is not diaphoretic.   HENT:      Head: Normocephalic and atraumatic.   Eyes:      General:         Right eye: No discharge.         Left eye: No discharge.      Pupils: Pupils are equal, round, and reactive to light.   Neck:      Thyroid: No thyromegaly.      Vascular: No carotid bruit or JVD.   Cardiovascular:      Rate and Rhythm: Normal rate and regular rhythm.      Pulses: Normal pulses and intact distal pulses.      Heart sounds: Murmur heard.      High-pitched blowing holosystolic murmur is present with a grade of 2/6 at the apex.      No friction rub. No gallop.   Pulmonary:      Effort: Pulmonary effort is normal. No  respiratory distress.      Breath sounds: Normal breath sounds. No wheezing or rales.   Chest:      Chest wall: No tenderness.   Abdominal:      General: Bowel sounds are normal. There is no distension.      Palpations: Abdomen is soft.      Tenderness: There is no abdominal tenderness.   Musculoskeletal:         General: Normal range of motion.      Cervical back: Neck supple.      Right lower leg: No edema.      Left lower leg: No edema.   Skin:     General: Skin is warm and dry.      Findings: No erythema or rash.   Neurological:      General: No focal deficit present.      Mental Status: He is alert and oriented to person, place, and time.      Cranial Nerves: No cranial nerve deficit.   Psychiatric:         Mood and Affect: Mood normal.         Behavior: Behavior normal.            Significant Labs:   Recent Lab Results  (Last 5 results in the past 24 hours)        05/05/24  1220   05/05/24  0818   05/05/24  0442   05/04/24 2015 05/04/24  1707        Albumin     3.0           ALP     59           ALT     14           Anion Gap     9           AST     21           Baso #     0.08                0.08           Basophil %     0.8                0.8           BILIRUBIN TOTAL     0.9  Comment: For infants and newborns, interpretation of results should be based  on gestational age, weight and in agreement with clinical  observations.    Premature Infant recommended reference ranges:  Up to 24 hours.............<8.0 mg/dL  Up to 48 hours............<12.0 mg/dL  3-5 days..................<15.0 mg/dL  6-29 days.................<15.0 mg/dL             BNP     796  Comment: Values of less than 100 pg/ml are consistent with non-CHF populations.           BUN     28           Calcium     9.1           Chloride     104           CO2     23           Creatinine     1.6           Differential Method     Automated                Automated           eGFR     45           Eos #     0.7                0.7           Eos %      6.5                6.5           Glucose     174           Gran # (ANC)     7.3                7.3           Gran %     68.7                68.7           Hematocrit     39.8                39.8           Hemoglobin     13.1                13.1           Immature Grans (Abs)     0.23  Comment: Mild elevation in immature granulocytes is non specific and   can be seen in a variety of conditions including stress response,   acute inflammation, trauma and pregnancy. Correlation with other   laboratory and clinical findings is essential.                  0.23  Comment: Mild elevation in immature granulocytes is non specific and   can be seen in a variety of conditions including stress response,   acute inflammation, trauma and pregnancy. Correlation with other   laboratory and clinical findings is essential.             Immature Granulocytes     2.2                2.2           Lymph #     1.3                1.3           Lymph %     12.2                12.2           MCH     28.9                28.9           MCHC     32.9                32.9           MCV     88                88           Mono #     1.0                1.0           Mono %     9.6                9.6           MPV     9.8                9.8           nRBC     0                0           Platelet Count     224                224           POCT Glucose 223   184     238   227       Potassium     4.2           PROTEIN TOTAL     6.6           RBC     4.54                4.54           RDW     14.8                14.8           Sodium     136           WBC     10.54                10.54                                  Significant Imaging: X-Ray: CXR: Narrative & Impression  EXAMINATION:  XR CHEST AP PORTABLE     CLINICAL HISTORY:  nstemi;     COMPARISON:  May 3, 2024     FINDINGS:  EKG leads and oxygen tubing overlie the chest.  Stable right jugular central line without pneumothorax.  Stable left lower lobe scarring or atelectasis.  The lungs are free of  new pulmonary opacities.  The hilar and mediastinal contours and osseous structures are unchanged.     Impression:     1.  Overall, no significant interval change has occurred.     2.  Incidental findings as noted above.        Electronically signed by:Ortiz Gary MD  Date:                                            05/05/2024  Time:                                           08:22  Assessment and Plan:     Brief HPI: A 72 YO AM,UMAIR PRESENTS WITH NSTEMI CHF NEEDED HIGH RIASK PCI WITH IOABP SUPPORT WITH TRANSIENT USE OF INOTROPES. HE IS RECOVERING WELL HIS MEDICAL THERAPY IS ADJUSTED RONALD DD LOW DOSE DIURETICS.     * NSTEMI (non-ST elevated myocardial infarction)  -Patient with history of CAD s/p remote CABG who presents with L shoulder pain/CP and elevated troponin---consistent with NSTEMI  -Troponin 0.072>1.759>41.611, repeat pending  -EKG reviewed, ischemic changes with QRS widening in multiple leads  -Continue ASA, heparin gtt, CCB, BB  -Patient intolerant to statin  -R/LHC planned today pending creatinine trend    5/3/2024  Had svg to pda intervention no complication with IABP support.no angina optimal therapy on board    5/4/2024  Iabp removed no angina will adjust meds.    5/5/2024 NO ANGINA      Acute combined systolic and diastolic congestive heart failure  Secondary to nstemi   Echo    Interpretation Summary    Left Ventricle: The left ventricle is normal in size. Normal wall thickness. Regional wall motion abnormalities present. See diagram for wall motion findings. Septal motion is consistent with bundle branch block. There is moderately reduced systolic function with a visually estimated ejection fraction of 30 - 40%. Ejection fraction by visual approximation is 30%. Biplane (2D) method of discs ejection fraction is 26%. There is diastolic dysfunction but grade cannot be determined.    Right Ventricle: Normal right ventricular cavity size. Wall thickness is normal. Systolic function is normal.    Left  Atrium: Left atrium is moderately dilated.    Mitral Valve: There is moderate regurgitation.    Pulmonary Artery: The estimated pulmonary artery systolic pressure is 31 mmHg.    IVC/SVC: Intermediate venous pressure at 8 mmHg.    Recent Labs   Lab 05/05/24  0442   *       Patient was diuresed revascularized inotropes started for lactic acidosis.     5/4/2024  Lactic acidosis resolved   Will stop inotropes.    5/5/2024   ADD LOW DOSE DIURETICS    Ischemic cardiomyopathy  See nstemi     Andie djust optimize medical therapy and wean off iabp.    5/4/2024   Iabp removed will increase afterload add aldactone.  5/5/2024   IS ON AFTERLOAD ALDACTONE AND B BLOCKERS. ANDIE DD LOW DOSE DIURETICS    CKD (chronic kidney disease)  -Creatinine 1.8, repeat pending    5/3/2024  Continue monitoring    5/4/2024  Cr improving    5/5/2024  CR IS 1.6 ANDIE DD LOW DOSE DIURETICS    Acute exacerbation of CHF (congestive heart failure)  -BNP elevated and CXR with congestion  -Gently diurese  -Preliminary review of TTE with depressed EF, official report to follow  -Continue BB  -Will further optimize regimen as tolerated  -R/LHC tentatively planned today    5/4/2024   Resolved will discontinue inotropes.    5/5/2024 ADD LOW DOSE DIURETICS    Hypertension  -Continue CCB, BB  -Will optimize meds as tolerated    5/4/2024   Stop amlodipine increase b blockers and afterload.    Diabetes mellitus  -Mgmt as per hospital medicine    5/5/2024 WILL BENEFIT FROM FARXIGA WILL WATCH RENAL FUNCTION FIRST.    Coronary artery disease  -See plan under NSTEMI        VTE Risk Mitigation (From admission, onward)           Ordered     IP VTE HIGH RISK PATIENT  Once         05/02/24 0324     Place sequential compression device  Until discontinued         05/02/24 0324                    Christina Kumar MD  Cardiology  [facility] ([facility])

## 2024-05-05 NOTE — PLAN OF CARE
Pt AAOx4. SPO2 96% on RA. HR NSR and BP normotensive. Voids per urinal with total UOP of 950 mL during this shift. POC reviewed with patient. Bed in lowest position, side rails up x 3, wheels locked, call light within reach and alarms on and audible.

## 2024-05-05 NOTE — ASSESSMENT & PLAN NOTE
Secondary to nstemi   Echo    Interpretation Summary    Left Ventricle: The left ventricle is normal in size. Normal wall thickness. Regional wall motion abnormalities present. See diagram for wall motion findings. Septal motion is consistent with bundle branch block. There is moderately reduced systolic function with a visually estimated ejection fraction of 30 - 40%. Ejection fraction by visual approximation is 30%. Biplane (2D) method of discs ejection fraction is 26%. There is diastolic dysfunction but grade cannot be determined.    Right Ventricle: Normal right ventricular cavity size. Wall thickness is normal. Systolic function is normal.    Left Atrium: Left atrium is moderately dilated.    Mitral Valve: There is moderate regurgitation.    Pulmonary Artery: The estimated pulmonary artery systolic pressure is 31 mmHg.    IVC/SVC: Intermediate venous pressure at 8 mmHg.    Recent Labs   Lab 05/05/24  0442   *       Patient was diuresed revascularized inotropes started for lactic acidosis.     5/4/2024  Lactic acidosis resolved   Will stop inotropes.    5/5/2024   ADD LOW DOSE DIURETICS

## 2024-05-05 NOTE — ASSESSMENT & PLAN NOTE
-BNP elevated and CXR with congestion  -Gently diurese  -Preliminary review of TTE with depressed EF, official report to follow  -Continue BB  -Will further optimize regimen as tolerated  -R/LHC tentatively planned today    5/4/2024   Resolved will discontinue inotropes.    5/5/2024 ADD LOW DOSE DIURETICS

## 2024-05-05 NOTE — ASSESSMENT & PLAN NOTE
-Creatinine 1.8, repeat pending    5/3/2024  Continue monitoring    5/4/2024  Cr improving    5/5/2024  CR IS 1.6 RONALD DD LOW DOSE DIURETICS

## 2024-05-06 LAB
ANION GAP SERPL CALC-SCNC: 11 MMOL/L (ref 8–16)
BASOPHILS # BLD AUTO: 0.1 K/UL (ref 0–0.2)
BASOPHILS NFR BLD: 0.9 % (ref 0–1.9)
BUN SERPL-MCNC: 33 MG/DL (ref 8–23)
CALCIUM SERPL-MCNC: 9.4 MG/DL (ref 8.7–10.5)
CHLORIDE SERPL-SCNC: 102 MMOL/L (ref 95–110)
CO2 SERPL-SCNC: 22 MMOL/L (ref 23–29)
CREAT SERPL-MCNC: 1.7 MG/DL (ref 0.5–1.4)
DIFFERENTIAL METHOD BLD: ABNORMAL
EOSINOPHIL # BLD AUTO: 0.6 K/UL (ref 0–0.5)
EOSINOPHIL NFR BLD: 5.3 % (ref 0–8)
ERYTHROCYTE [DISTWIDTH] IN BLOOD BY AUTOMATED COUNT: 14.6 % (ref 11.5–14.5)
EST. GFR  (NO RACE VARIABLE): 42 ML/MIN/1.73 M^2
GLUCOSE SERPL-MCNC: 162 MG/DL (ref 70–110)
HCT VFR BLD AUTO: 43.9 % (ref 40–54)
HGB BLD-MCNC: 14.2 G/DL (ref 14–18)
IMM GRANULOCYTES # BLD AUTO: 0.15 K/UL (ref 0–0.04)
IMM GRANULOCYTES NFR BLD AUTO: 1.4 % (ref 0–0.5)
LACTATE SERPL-SCNC: 1.8 MMOL/L (ref 0.5–2.2)
LYMPHOCYTES # BLD AUTO: 1.4 K/UL (ref 1–4.8)
LYMPHOCYTES NFR BLD: 13.1 % (ref 18–48)
MCH RBC QN AUTO: 28.5 PG (ref 27–31)
MCHC RBC AUTO-ENTMCNC: 32.3 G/DL (ref 32–36)
MCV RBC AUTO: 88 FL (ref 82–98)
MONOCYTES # BLD AUTO: 1.1 K/UL (ref 0.3–1)
MONOCYTES NFR BLD: 10.6 % (ref 4–15)
NEUTROPHILS # BLD AUTO: 7.3 K/UL (ref 1.8–7.7)
NEUTROPHILS NFR BLD: 68.7 % (ref 38–73)
NRBC BLD-RTO: 0 /100 WBC
PLATELET # BLD AUTO: 244 K/UL (ref 150–450)
PMV BLD AUTO: 10.2 FL (ref 9.2–12.9)
POCT GLUCOSE: 152 MG/DL (ref 70–110)
POCT GLUCOSE: 198 MG/DL (ref 70–110)
POCT GLUCOSE: 200 MG/DL (ref 70–110)
POCT GLUCOSE: 241 MG/DL (ref 70–110)
POTASSIUM SERPL-SCNC: 4.3 MMOL/L (ref 3.5–5.1)
RBC # BLD AUTO: 4.98 M/UL (ref 4.6–6.2)
SODIUM SERPL-SCNC: 135 MMOL/L (ref 136–145)
TROPONIN I SERPL DL<=0.01 NG/ML-MCNC: 11.08 NG/ML (ref 0–0.03)
WBC # BLD AUTO: 10.63 K/UL (ref 3.9–12.7)

## 2024-05-06 PROCEDURE — 94761 N-INVAS EAR/PLS OXIMETRY MLT: CPT

## 2024-05-06 PROCEDURE — 25000003 PHARM REV CODE 250: Performed by: INTERNAL MEDICINE

## 2024-05-06 PROCEDURE — 36415 COLL VENOUS BLD VENIPUNCTURE: CPT | Performed by: PHYSICIAN ASSISTANT

## 2024-05-06 PROCEDURE — 97116 GAIT TRAINING THERAPY: CPT | Mod: CQ

## 2024-05-06 PROCEDURE — 21400001 HC TELEMETRY ROOM

## 2024-05-06 PROCEDURE — 84484 ASSAY OF TROPONIN QUANT: CPT | Performed by: PHYSICIAN ASSISTANT

## 2024-05-06 PROCEDURE — 83605 ASSAY OF LACTIC ACID: CPT | Performed by: PHYSICIAN ASSISTANT

## 2024-05-06 PROCEDURE — 85025 COMPLETE CBC W/AUTO DIFF WBC: CPT | Performed by: INTERNAL MEDICINE

## 2024-05-06 PROCEDURE — 99232 SBSQ HOSP IP/OBS MODERATE 35: CPT | Mod: ,,, | Performed by: PHYSICIAN ASSISTANT

## 2024-05-06 PROCEDURE — 97530 THERAPEUTIC ACTIVITIES: CPT | Mod: CQ

## 2024-05-06 PROCEDURE — 80048 BASIC METABOLIC PNL TOTAL CA: CPT | Performed by: INTERNAL MEDICINE

## 2024-05-06 RX ADMIN — LOSARTAN POTASSIUM 50 MG: 50 TABLET, FILM COATED ORAL at 08:05

## 2024-05-06 RX ADMIN — FERROUS SULFATE TAB 325 MG (65 MG ELEMENTAL FE) 1 EACH: 325 (65 FE) TAB at 08:05

## 2024-05-06 RX ADMIN — FUROSEMIDE 20 MG: 20 TABLET ORAL at 08:05

## 2024-05-06 RX ADMIN — INSULIN ASPART 1 UNITS: 100 INJECTION, SOLUTION INTRAVENOUS; SUBCUTANEOUS at 08:05

## 2024-05-06 RX ADMIN — INSULIN DETEMIR 11 UNITS: 100 INJECTION, SOLUTION SUBCUTANEOUS at 08:05

## 2024-05-06 RX ADMIN — TICAGRELOR 90 MG: 90 TABLET ORAL at 08:05

## 2024-05-06 RX ADMIN — MUPIROCIN: 20 OINTMENT TOPICAL at 08:05

## 2024-05-06 RX ADMIN — LEVOTHYROXINE SODIUM 50 MCG: 50 TABLET ORAL at 05:05

## 2024-05-06 RX ADMIN — METOPROLOL SUCCINATE 50 MG: 50 TABLET, EXTENDED RELEASE ORAL at 08:05

## 2024-05-06 RX ADMIN — ASPIRIN 81 MG CHEWABLE TABLET 81 MG: 81 TABLET CHEWABLE at 08:05

## 2024-05-06 RX ADMIN — TAMSULOSIN HYDROCHLORIDE 0.4 MG: 0.4 CAPSULE ORAL at 08:05

## 2024-05-06 RX ADMIN — INSULIN ASPART 4 UNITS: 100 INJECTION, SOLUTION INTRAVENOUS; SUBCUTANEOUS at 06:05

## 2024-05-06 NOTE — ASSESSMENT & PLAN NOTE
See nstemi     Andie djust optimize medical therapy and wean off iabp.    5/4/2024   Iabp removed will increase afterload add aldactone.  5/5/2024   IS ON AFTERLOAD ALDACTONE AND B BLOCKERS. ANDIE DD LOW DOSE DIURETICS    5/6/24  -Stable  -Continue BB, low dose Lasix, ARB  -LifeVest for  SCD prevention

## 2024-05-06 NOTE — ASSESSMENT & PLAN NOTE
-Continue CCB, BB  -Will optimize meds as tolerated    5/4/2024   Stop amlodipine increase b blockers and afterload.    5/6/24  -Continue BB, ARB  -Will consider switching to Entresto tmw

## 2024-05-06 NOTE — PT/OT/SLP PROGRESS
"Physical Therapy  Treatment    Nahum Palacio   MRN: [id number]   Admitting Diagnosis: NSTEMI (non-ST elevated myocardial infarction)    PT Received On: 05/06/24  PT Start Time: 0815     PT Stop Time: 0840    PT Total Time (min): 25 min       Billable Minutes:  Gait Training 15 and Therapeutic Activity 10    Treatment Type: Treatment  PT/PTA: PTA     Number of PTA visits since last PT visit: 1       General Precautions: Standard, fall  Orthopedic Precautions: N/A  Braces: N/A  Respiratory Status: Room air    Spiritual, Cultural Beliefs, Orthodox Practices, Values that Affect Care: yes    Subjective:  Communicated with patient's nurse, Tsering, and completed Epic chart review prior to session.  Patient agreed to PT session. Reports feeling "much better" today.     Pain/Comfort  Pain Rating 1: 0/10  Pain Rating Post-Intervention 1: 0/10    Objective:   Patient found with: peripheral IV, telemetry    Supine > sit EOB: Modified Independent    Forward scoot towards EOB: Modified Independent    STS from EOB No AD: Modified Independent    200ft x4 trials w/ RW SPV    200ft No AD SPV    Stand pivot T/F to chair No AD: SPV    Completed x10 reps AROM TE to BLE: LAQ, Hip Flex, AP   Intermittent cues given as needed to maintain correct form during repetitions    Educated patient on importance of increased tolerance to upright position and direct impact on CV endurance and strength. Patient encouraged to sit up in chair/ EOB, for a minimum of 2 consecutive hours, 3x per day. Encouraged patient to perform AROM TE to BLE throughout the day within all available planes of motion. Re enforced importance of utilizing call light to meet needs in room and not attempt to get up without staff assistance. Patient verbalized understanding and agreed to comply.       AM-PAC 6 CLICK MOBILITY  How much help from another person does this patient currently need?   1 = Unable, Total/Dependent Assistance  2 = A lot, Maximum/Moderate Assistance  3 " = A little, Minimum/Contact Guard/Supervision  4 = None, Modified [place]/Independent    Turning over in bed (including adjusting bedclothes, sheets and blankets)?: 4  Sitting down on and standing up from a chair with arms (e.g., wheelchair, bedside commode, etc.): 3  Moving from lying on back to sitting on the side of the bed?: 4  Moving to and from a bed to a chair (including a wheelchair)?: 3  Need to walk in hospital room?: 3  Climbing 3-5 steps with a railing?: 1 (NT)  Basic Mobility Total Score: 18    AM-PAC Raw Score CMS G-Code Modifier Level of Impairment Assistance   6 % Total / Unable   7 - 9 CM 80 - 100% Maximal Assist   10 - 14 CL 60 - 80% Moderate Assist   15 - 19 CK 40 - 60% Moderate Assist   20 - 22 CJ 20 - 40% Minimal Assist   23 CI 1-20% SBA / CGA   24 CH 0% Independent/ Mod I     Patient left up in chair with call button in reach.    Assessment:  Nahum Palacio is a 73 y.o. male with a medical diagnosis of NSTEMI (non-ST elevated myocardial infarction) and presents with overall decline in functional mobility. Patient would continue to benefit from skilled PT to address functional limitations listed below in order to return to PLOF/decrease caregiver burden.     Rehab identified problem list/impairments: weakness, gait instability    Rehab potential is good.    Activity tolerance: Good    Discharge recommendations: Low Intensity Therapy      Barriers to discharge:      Equipment recommendations: none     GOALS:   Multidisciplinary Problems       Physical Therapy Goals          Problem: Physical Therapy    Goal Priority Disciplines Outcome Goal Variances Interventions   Physical Therapy Goal     PT, PT/OT      Description: LTG'S TO BE MET IN 14 DAYS (5/18/24)  PT WILL REQUIRE mod I FOR BED MOBILITY  PT WILL REQUIRE mod I FOR BED<>CHAIR TF'S  PT WILL  FEET WITH RW AND SBA  PT WILL INC AMPAC SCORE BY 2 POINTS TO PROGRESS GROSS FUNC MOBILITY                         PLAN:    Patient  to be seen 3 x/week to address the above listed problems via gait training, therapeutic activities, therapeutic exercises  Plan of Care expires: 05/18/24  Plan of Care reviewed with: patient         05/06/2024

## 2024-05-06 NOTE — NURSING TRANSFER
Nursing Transfer Note      5/6/2024   2:02 AM    Nurse giving handoff:Mone Diaz RN   Nurse receiving handoff:Estelita Riley RN    Reason patient is being transferred: No longer requires ICU care.     Transfer To: Telemetry     Transfer via wheelchair    Transfer with cardiac monitoring    Transported by Mone Diaz RN    Transfer Vital Signs:  Blood Pressure:133/69  Heart Rate:71  O2:95  Temperature:98.3  Respirations:20    Telemetry: Rate 71 and Rhythm NSR  Order for Tele Monitor? Yes    No additional lines.     4eyes on Skin: yes    Medicines sent: Insulin Aspart and Levemir     Any special needs or follow-up needed: No    Patient belongings transferred with patient: Yes    Chart send with patient: Yes    Notified: spouse    Patient reassessed at: 5/6/24 at 0105 (date, time)  1  Upon arrival to floor: cardiac monitor applied, patient oriented to room, call bell in reach, and bed in lowest position

## 2024-05-06 NOTE — ASSESSMENT & PLAN NOTE
-BNP elevated and CXR with congestion  -Gently diurese  -Preliminary review of TTE with depressed EF, official report to follow  -Continue BB  -Will further optimize regimen as tolerated  -R/LHC tentatively planned today    5/4/2024   Resolved will discontinue inotropes.    5/5/2024 ADD LOW DOSE DIURETICS    5/6/24  -Continue po Lasix, ARB, BB  -Hold Aldactone due to bumped creatinine

## 2024-05-06 NOTE — ASSESSMENT & PLAN NOTE
-Creatinine 1.8, repeat pending    5/3/2024  Continue monitoring    5/4/2024  Cr improving    5/5/2024  CR IS 1.6 RONALD DD LOW DOSE DIURETICS    5/6/24  -Creatinine 1.7, monitor

## 2024-05-06 NOTE — PROGRESS NOTES
[facility] - Telemetry ([facility])  Cardiology  Progress Note    Patient Name: Nahum Palacio  MRN: [id number]  Admission Date: 5/1/2024  Hospital Length of Stay: 4 days  Code Status: Full Code   Attending Physician: Chan Jimenes MD   Primary Care Physician: Eric Foster MD  Expected Discharge Date:   Principal Problem:NSTEMI (non-ST elevated myocardial infarction)    Subjective:   HPI:  Mr. Palacio is a 73 year old male patient whose current medical conditions include CAD s/p remote CABG in 2006 (LIMA-LAD, SVG-Ramus, SVG-RCA), hyperlipidemia, HTN, and statin intolerance who presented to [facility] ED overnight due to persistent heavy left shoulder pain. Associated symptoms included jaw discomfort, nausea, and pain near his left pectoral muscle. He denied any associated vomiting, diaphoresis, palpitations, near syncope, or syncope. Reported he had been dealing with left shoulder pain for quite some time due to prior fall and thought it was due to MSK/arthritic pain but became concerned when pain persisted despite warm compresses/hot shower. Initial workup in ED revealed troponin of 0.072>0.1759 and BNP of 978. Patient subsequently admitted for further evaluation and treatment. Cardiology consulted to assist with management. Patient seen and examined today, resting in bed. Feeling better. Denies any left shoulder pain/CP during exam. No other CV complaints. He reports compliance with his medications. Followed on OP basis by Dr. Montana. Troponin bumped up to 41. EKG reviewed, shows ischemic changes/QRS widening in multiple leads. Preliminary bedside read of TTE by MD revealed depressed EF. Plans for R/LHC today pending repeat BMP.        Hospital Course:   5/3/2024 had svg to pda intervention with iabp support . His cr stable hct stable has no ischemia fly lower extremity. His lactic acid is [id number] dobutamine started. He received nitrates and losartan dropped his bp .iabp on 1:3   No chest pian or shortness of  breath tolerated iabp well.    5/4/2024 feels better this morning denies any chf symptoms or angina . Cr 1.5 trending down.no groin hematoma. Bleeding from central line stopped.    5/5/2024 HE FEELS WELL THIS MORNING. HE AHS NO CHEST PAIN OR SHORTNESS OF BREATH HIS CR 1.6.HIS BNP IS MILDLY INCREASED .CVP 8     5/6/24-Patient seen and examined today, resting comfortably in bed. Admits to some mild SOB that is improving. No CP.  Creatinine bumped to 1.7, Aldactone held. LA WNL. Needs LifeVest for SCD prevention.        Review of Systems   Constitutional: Positive for malaise/fatigue.   HENT: Negative.     Eyes: Negative.    Cardiovascular: Negative.    Respiratory: Negative.     Endocrine: Negative.    Hematologic/Lymphatic: Negative.    Skin: Negative.    Musculoskeletal: Negative.    Gastrointestinal: Negative.    Genitourinary: Negative.    Neurological: Negative.    Psychiatric/Behavioral: Negative.     Allergic/Immunologic: Negative.      Objective:     Vital Signs (Most Recent):  Temp: 97.8 °F (36.6 °C) (05/06/24 1147)  Pulse: (!) 57 (05/06/24 1147)  Resp: 18 (05/06/24 1147)  BP: (!) 122/58 (05/06/24 1147)  SpO2: 97 % (05/06/24 1147) Vital Signs (24h Range):  Temp:  [97.8 °F (36.6 °C)-98.4 °F (36.9 °C)] 97.8 °F (36.6 °C)  Pulse:  [55-71] 57  Resp:  [15-26] 18  SpO2:  [92 %-98 %] 97 %  BP: (102-140)/(58-72) 122/58     Weight: 71.1 kg (156 lb 12 oz)  Body mass index is 26.08 kg/m².     SpO2: 97 %         Intake/Output Summary (Last 24 hours) at 5/6/2024 1235  Last data filed at 5/5/2024 1851  Gross per 24 hour   Intake --   Output 800 ml   Net -800 ml       Lines/Drains/Airways       Peripheral Intravenous Line  Duration                  Peripheral IV - Single Lumen 05/01/24 2329 20 G Right Antecubital 4 days         Peripheral IV - Single Lumen 05/02/24 0315 20 G Left Antecubital 4 days         Peripheral IV - Single Lumen 05/05/24 1600 20 G Anterior;Right Forearm <1 day                       Physical  Exam  Vitals and nursing note reviewed.   Constitutional:       General: He is not in acute distress.     Appearance: Normal appearance. He is well-developed. He is not diaphoretic.   HENT:      Head: Normocephalic and atraumatic.   Eyes:      General:         Right eye: No discharge.         Left eye: No discharge.      Pupils: Pupils are equal, round, and reactive to light.   Cardiovascular:      Rate and Rhythm: Normal rate and regular rhythm.      Chest Wall: PMI is not displaced.      Pulses: Intact distal pulses.      Heart sounds: Normal heart sounds. No murmur heard.     No friction rub. No gallop. No S3 or S4 sounds.   Pulmonary:      Effort: Pulmonary effort is normal. No respiratory distress.      Breath sounds: Normal breath sounds. No wheezing or rales.   Abdominal:      General: There is no distension.   Musculoskeletal:      Right lower leg: No edema.      Left lower leg: No edema.   Skin:     General: Skin is warm and dry.      Findings: No erythema.   Neurological:      General: No focal deficit present.      Mental Status: He is alert and oriented to person, place, and time.   Psychiatric:         Mood and Affect: Mood normal.         Behavior: Behavior normal.            Significant Labs: CMP   Recent Labs   Lab 05/05/24  0442 05/06/24  0509    135*   K 4.2 4.3    102   CO2 23 22*   * 162*   BUN 28* 33*   CREATININE 1.6* 1.7*   CALCIUM 9.1 9.4   PROT 6.6  --    ALBUMIN 3.0*  --    BILITOT 0.9  --    ALKPHOS 59  --    AST 21  --    ALT 14  --    ANIONGAP 9 11   , CBC   Recent Labs   Lab 05/05/24  0442 05/06/24  0509   WBC 10.54  10.54 10.63   HGB 13.1*  13.1* 14.2   HCT 39.8*  39.8* 43.9     224 244   , Troponin   Recent Labs   Lab 05/06/24  1012   TROPONINI 11.083*   , and All pertinent lab results from the last 24 hours have been reviewed.    Significant Imaging: Echocardiogram: Transthoracic echo (TTE) complete (Cupid Only):   Results for orders placed or performed  during the hospital encounter of 05/01/24   Echo   Result Value Ref Range    BSA 1.88 m2    Pineda's Biplane MOD Ejection Fraction 26 %    LVOT stroke volume 47.18 cm3    LVIDd 5.64 3.5 - 6.0 cm    LV Systolic Volume 110.97 mL    LV Systolic Volume Index 60.0 mL/m2    LVIDs 4.87 (A) 2.1 - 4.0 cm    LV Diastolic Volume 156.29 mL    LV Diastolic Volume Index 84.48 mL/m2    IVS 0.95 0.6 - 1.1 cm    LVOT diameter 2.05 cm    LVOT area 3.3 cm2    FS 14 (A) 28 - 44 %    Left Ventricle Relative Wall Thickness 0.25 cm    Posterior Wall 0.71 0.6 - 1.1 cm    LV mass 175.00 g    LV Mass Index 95 g/m2    MV Peak E Chandra 0.50 m/s    TDI SEPTAL 0.04 m/s    MV Peak A Chandra 0.61 m/s    TR Max Chandra 2.38 m/s    E/A ratio 0.82     IVRT 110.37 msec    E wave deceleration time 146.15 msec    LV SEPTAL E/E' RATIO 12.50 m/s    LVOT peak chandra 0.72 m/s    Left Ventricular Outflow Tract Mean Velocity 0.51 cm/s    Left Ventricular Outflow Tract Mean Gradient 1.19 mmHg    RVDD 3.43 cm    RVOT peak VTI 12.2 cm    TAPSE 1.34 cm    RV/LV Ratio 0.61 cm    LA size 4.33 cm    Left Atrium Minor Axis 4.94 cm    Left Atrium Major Axis 5.34 cm    RA Major Axis 4.26 cm    Vn Nyquist MS 0.33 m/s    AV mean gradient 2 mmHg    AV peak gradient 4 mmHg    Ao peak chandra 1.05 m/s    Ao VTI 20.30 cm    LVOT peak VTI 14.30 cm    AV valve area 2.32 cm²    AV Velocity Ratio 0.69     AV index (prosthetic) 0.70     ANTWAN by Velocity Ratio 2.26 cm²    Radius 0.65 cm    Vn Nyquist 0.33 m/s    Mr max chandra 5.48 m/s    MR PISA EROA 0.16 cm2    MV stenosis pressure 1/2 time 42.38 ms    MV valve area p 1/2 method 5.19 cm2    Triscuspid Valve Regurgitation Peak Gradient 23 mmHg    PV mean gradient 1 mmHg    PV PEAK VELOCITY 0.79 m/s    PV peak gradient 2 mmHg    RVOT peak chandra 0.65 m/s    Ao root annulus 3.11 cm    STJ 2.91 cm    Ascending aorta 3.13 cm    IVC diameter 1.92 cm    ZLVIDS 3.39     ZLVIDD 0.93     LA Volume Index 43.9 mL/m2    LA volume 81.22 cm3    LA WIDTH 4.3 cm    RA  Width 3.5 cm    EF 30 %    TV resting pulmonary artery pressure 31 mmHg    RV TB RVSP 10 mmHg    Est. RA pres 8 mmHg    Narrative      Left Ventricle: The left ventricle is normal in size. Normal wall   thickness. Regional wall motion abnormalities present. See diagram for   wall motion findings. Septal motion is consistent with bundle branch   block. There is moderately reduced systolic function with a visually   estimated ejection fraction of 30 - 40%. Ejection fraction by visual   approximation is 30%. Biplane (2D) method of discs ejection fraction is   26%. There is diastolic dysfunction but grade cannot be determined.    Right Ventricle: Normal right ventricular cavity size. Wall thickness   is normal. Systolic function is normal.    Left Atrium: Left atrium is moderately dilated.    Mitral Valve: There is moderate regurgitation.    Pulmonary Artery: The estimated pulmonary artery systolic pressure is   31 mmHg.    IVC/SVC: Intermediate venous pressure at 8 mmHg.      and EKG: Reviewed  Assessment and Plan:   Patient who presents with NSTEMI/ICM s/p successful intervention. Remains stable. Creatinine up-trending, meds adjusted. LifeVest ordered for SCD prevention. Troponin trending down.    * NSTEMI (non-ST elevated myocardial infarction)  -Patient with history of CAD s/p remote CABG who presents with L shoulder pain/CP and elevated troponin---consistent with NSTEMI  -Troponin 0.072>1.759>41.611, repeat pending  -EKG reviewed, ischemic changes with QRS widening in multiple leads  -Continue ASA, heparin gtt, CCB, BB  -Patient intolerant to statin  -R/LHC planned today pending creatinine trend    5/3/2024  Had svg to pda intervention no complication with IABP support.no angina optimal therapy on board    5/4/2024  Iabp removed no angina will adjust meds.    5/5/2024 NO ANGINA      Acute combined systolic and diastolic congestive heart failure  Secondary to nstemi   Echo    Interpretation Summary    Left  Ventricle: The left ventricle is normal in size. Normal wall thickness. Regional wall motion abnormalities present. See diagram for wall motion findings. Septal motion is consistent with bundle branch block. There is moderately reduced systolic function with a visually estimated ejection fraction of 30 - 40%. Ejection fraction by visual approximation is 30%. Biplane (2D) method of discs ejection fraction is 26%. There is diastolic dysfunction but grade cannot be determined.    Right Ventricle: Normal right ventricular cavity size. Wall thickness is normal. Systolic function is normal.    Left Atrium: Left atrium is moderately dilated.    Mitral Valve: There is moderate regurgitation.    Pulmonary Artery: The estimated pulmonary artery systolic pressure is 31 mmHg.    IVC/SVC: Intermediate venous pressure at 8 mmHg.    Recent Labs   Lab 05/05/24  0442   *       Patient was diuresed revascularized inotropes started for lactic acidosis.     5/4/2024  Lactic acidosis resolved   Will stop inotropes.    5/5/2024   ADD LOW DOSE DIURETICS    5/6/24  -Continue BB, po Lasix, ARB  -Needs LifeVest for SCD prevention EF 30%    Ischemic cardiomyopathy  See nstemi     Andie djust optimize medical therapy and wean off iabp.    5/4/2024   Iabp removed will increase afterload add aldactone.  5/5/2024   IS ON AFTERLOAD ALDACTONE AND B BLOCKERS. ANDIE DD LOW DOSE DIURETICS    5/6/24  -Stable  -Continue BB, low dose Lasix, ARB  -LifeVest for  SCD prevention    CKD (chronic kidney disease)  -Creatinine 1.8, repeat pending    5/3/2024  Continue monitoring    5/4/2024  Cr improving    5/5/2024  CR IS 1.6 ANDIE DD LOW DOSE DIURETICS    5/6/24  -Creatinine 1.7, monitor    Acute exacerbation of CHF (congestive heart failure)  -BNP elevated and CXR with congestion  -Gently diurese  -Preliminary review of TTE with depressed EF, official report to follow  -Continue BB  -Will further optimize regimen as tolerated  -R/LHC tentatively  planned today    5/4/2024   Resolved will discontinue inotropes.    5/5/2024 ADD LOW DOSE DIURETICS    5/6/24  -Continue po Lasix, ARB, BB  -Hold Aldactone due to bumped creatinine    Hypertension  -Continue CCB, BB  -Will optimize meds as tolerated    5/4/2024   Stop amlodipine increase b blockers and afterload.    5/6/24  -Continue BB, ARB  -Will consider switching to Entresto tmw    Diabetes mellitus  -Mgmt as per hospital medicine    5/5/2024 WILL BENEFIT FROM FARXIGA WILL WATCH RENAL FUNCTION FIRST.    Coronary artery disease  -See plan under NSTEMI        VTE Risk Mitigation (From admission, onward)           Ordered     IP VTE HIGH RISK PATIENT  Once         05/02/24 0324     Place sequential compression device  Until discontinued         05/02/24 0324                    Christelle Gonzalez PA-C  Cardiology  [facility] - Telemetry ([facility])

## 2024-05-06 NOTE — NURSING
Notified by monitor room that patient had 2.6 second pause. [facility] medicine and cardiology (Dr. Kumar) notified via secure chat. Pt sleeping and asymptomatic. Day shift nurse Tsering to assume care of pt     Susan Murphy RN

## 2024-05-06 NOTE — SUBJECTIVE & OBJECTIVE
Review of Systems   Constitutional: Positive for malaise/fatigue.   HENT: Negative.     Eyes: Negative.    Cardiovascular: Negative.    Respiratory: Negative.     Endocrine: Negative.    Hematologic/Lymphatic: Negative.    Skin: Negative.    Musculoskeletal: Negative.    Gastrointestinal: Negative.    Genitourinary: Negative.    Neurological: Negative.    Psychiatric/Behavioral: Negative.     Allergic/Immunologic: Negative.      Objective:     Vital Signs (Most Recent):  Temp: 97.8 °F (36.6 °C) (05/06/24 1147)  Pulse: (!) 57 (05/06/24 1147)  Resp: 18 (05/06/24 1147)  BP: (!) 122/58 (05/06/24 1147)  SpO2: 97 % (05/06/24 1147) Vital Signs (24h Range):  Temp:  [97.8 °F (36.6 °C)-98.4 °F (36.9 °C)] 97.8 °F (36.6 °C)  Pulse:  [55-71] 57  Resp:  [15-26] 18  SpO2:  [92 %-98 %] 97 %  BP: (102-140)/(58-72) 122/58     Weight: 71.1 kg (156 lb 12 oz)  Body mass index is 26.08 kg/m².     SpO2: 97 %         Intake/Output Summary (Last 24 hours) at 5/6/2024 1235  Last data filed at 5/5/2024 1851  Gross per 24 hour   Intake --   Output 800 ml   Net -800 ml       Lines/Drains/Airways       Peripheral Intravenous Line  Duration                  Peripheral IV - Single Lumen 05/01/24 2329 20 G Right Antecubital 4 days         Peripheral IV - Single Lumen 05/02/24 0315 20 G Left Antecubital 4 days         Peripheral IV - Single Lumen 05/05/24 1600 20 G Anterior;Right Forearm <1 day                       Physical Exam  Vitals and nursing note reviewed.   Constitutional:       General: He is not in acute distress.     Appearance: Normal appearance. He is well-developed. He is not diaphoretic.   HENT:      Head: Normocephalic and atraumatic.   Eyes:      General:         Right eye: No discharge.         Left eye: No discharge.      Pupils: Pupils are equal, round, and reactive to light.   Cardiovascular:      Rate and Rhythm: Normal rate and regular rhythm.      Chest Wall: PMI is not displaced.      Pulses: Intact distal pulses.       Heart sounds: Normal heart sounds. No murmur heard.     No friction rub. No gallop. No S3 or S4 sounds.   Pulmonary:      Effort: Pulmonary effort is normal. No respiratory distress.      Breath sounds: Normal breath sounds. No wheezing or rales.   Abdominal:      General: There is no distension.   Musculoskeletal:      Right lower leg: No edema.      Left lower leg: No edema.   Skin:     General: Skin is warm and dry.      Findings: No erythema.   Neurological:      General: No focal deficit present.      Mental Status: He is alert and oriented to person, place, and time.   Psychiatric:         Mood and Affect: Mood normal.         Behavior: Behavior normal.            Significant Labs: CMP   Recent Labs   Lab 05/05/24  0442 05/06/24  0509    135*   K 4.2 4.3    102   CO2 23 22*   * 162*   BUN 28* 33*   CREATININE 1.6* 1.7*   CALCIUM 9.1 9.4   PROT 6.6  --    ALBUMIN 3.0*  --    BILITOT 0.9  --    ALKPHOS 59  --    AST 21  --    ALT 14  --    ANIONGAP 9 11   , CBC   Recent Labs   Lab 05/05/24  0442 05/06/24  0509   WBC 10.54  10.54 10.63   HGB 13.1*  13.1* 14.2   HCT 39.8*  39.8* 43.9     224 244   , Troponin   Recent Labs   Lab 05/06/24  1012   TROPONINI 11.083*   , and All pertinent lab results from the last 24 hours have been reviewed.    Significant Imaging: Echocardiogram: Transthoracic echo (TTE) complete (Cupid Only):   Results for orders placed or performed during the hospital encounter of 05/01/24   Echo   Result Value Ref Range    BSA 1.88 m2    Pineda's Biplane MOD Ejection Fraction 26 %    LVOT stroke volume 47.18 cm3    LVIDd 5.64 3.5 - 6.0 cm    LV Systolic Volume 110.97 mL    LV Systolic Volume Index 60.0 mL/m2    LVIDs 4.87 (A) 2.1 - 4.0 cm    LV Diastolic Volume 156.29 mL    LV Diastolic Volume Index 84.48 mL/m2    IVS 0.95 0.6 - 1.1 cm    LVOT diameter 2.05 cm    LVOT area 3.3 cm2    FS 14 (A) 28 - 44 %    Left Ventricle Relative Wall Thickness 0.25 cm    Posterior  Wall 0.71 0.6 - 1.1 cm    LV mass 175.00 g    LV Mass Index 95 g/m2    MV Peak E Chandra 0.50 m/s    TDI SEPTAL 0.04 m/s    MV Peak A Chandra 0.61 m/s    TR Max Chandra 2.38 m/s    E/A ratio 0.82     IVRT 110.37 msec    E wave deceleration time 146.15 msec    LV SEPTAL E/E' RATIO 12.50 m/s    LVOT peak chandra 0.72 m/s    Left Ventricular Outflow Tract Mean Velocity 0.51 cm/s    Left Ventricular Outflow Tract Mean Gradient 1.19 mmHg    RVDD 3.43 cm    RVOT peak VTI 12.2 cm    TAPSE 1.34 cm    RV/LV Ratio 0.61 cm    LA size 4.33 cm    Left Atrium Minor Axis 4.94 cm    Left Atrium Major Axis 5.34 cm    RA Major Axis 4.26 cm    Vn Nyquist MS 0.33 m/s    AV mean gradient 2 mmHg    AV peak gradient 4 mmHg    Ao peak chandar 1.05 m/s    Ao VTI 20.30 cm    LVOT peak VTI 14.30 cm    AV valve area 2.32 cm²    AV Velocity Ratio 0.69     AV index (prosthetic) 0.70     ANTWAN by Velocity Ratio 2.26 cm²    Radius 0.65 cm    Vn Nyquist 0.33 m/s    Mr max chandra 5.48 m/s    MR PISA EROA 0.16 cm2    MV stenosis pressure 1/2 time 42.38 ms    MV valve area p 1/2 method 5.19 cm2    Triscuspid Valve Regurgitation Peak Gradient 23 mmHg    PV mean gradient 1 mmHg    PV PEAK VELOCITY 0.79 m/s    PV peak gradient 2 mmHg    RVOT peak chandra 0.65 m/s    Ao root annulus 3.11 cm    STJ 2.91 cm    Ascending aorta 3.13 cm    IVC diameter 1.92 cm    ZLVIDS 3.39     ZLVIDD 0.93     LA Volume Index 43.9 mL/m2    LA volume 81.22 cm3    LA WIDTH 4.3 cm    RA Width 3.5 cm    EF 30 %    TV resting pulmonary artery pressure 31 mmHg    RV TB RVSP 10 mmHg    Est. RA pres 8 mmHg    Narrative      Left Ventricle: The left ventricle is normal in size. Normal wall   thickness. Regional wall motion abnormalities present. See diagram for   wall motion findings. Septal motion is consistent with bundle branch   block. There is moderately reduced systolic function with a visually   estimated ejection fraction of 30 - 40%. Ejection fraction by visual   approximation is 30%. Biplane (2D) method  of discs ejection fraction is   26%. There is diastolic dysfunction but grade cannot be determined.    Right Ventricle: Normal right ventricular cavity size. Wall thickness   is normal. Systolic function is normal.    Left Atrium: Left atrium is moderately dilated.    Mitral Valve: There is moderate regurgitation.    Pulmonary Artery: The estimated pulmonary artery systolic pressure is   31 mmHg.    IVC/SVC: Intermediate venous pressure at 8 mmHg.      and EKG: Reviewed

## 2024-05-06 NOTE — CONSULTS
DIANA faxed referral to [facility] for patient's lifevest.     DIANA also sent a message, to Pancho ALLEN, with [facility], letting him know of LifeVest order coming in.      [phone] Received fax confirmation [phone], via email.    DIANA will continue to follow and assist as needed.

## 2024-05-06 NOTE — PROGRESS NOTES
[facility] Medicine  Progress Note     Patient Name:  Nahum Palacio  MRN:  [id number]  Patient Class: IP-Inpatient  Admission Date:  5/1/2024   Length of Stay:  4  Attending Physician: Chan Jimenes MD  Primary Care Provider: Eric Foster MD    Subjective:      Subsequent Care: Follow up NSTEMI (non-ST elevated myocardial infarction)        HPI:  Mr. Palacio, a 73-year-old male with a history of CAD status post coronary artery bypass grafting in 2006, diabetes mellitus, hypertension, hyperlipidemia, and statin intolerance, presented to the ED at [facility] overnight due to persistent heavy left shoulder pain, associated with jaw discomfort and nausea. He denied vomiting, diaphoresis, palpitations, near syncope, or syncope. Initially attributing the pain to a prior fall and suspecting musculoskeletal or arthritic origins, his concern escalated as the pain persisted despite home remedies. Initial labs showed elevated troponins (initially 0.072 increasing to 41) and BNP of 978, indicative of cardiac stress. An EKG demonstrated ischemic changes and QRS widening. An urgent transthoracic echocardiogram revealed a reduced EF, prompting further assessment via right and left heart catheterization with repeat BMP pending.    Admission to the ICU followed his catheterization procedure, performed by Dr. Kumar, which disclosed mostly occluded native vessels with a patent LIMA to LAD and a stented SVG to PDA. High filling pressures and moderate pulmonary hypertension were noted; thus, an IABP was placed, and IV Lasix 40 mg was administered. Mr. Palacio was stepped down out of the ICU where hospital medicine resumed care.       Hospital Course:   Mr. Palacio's hospital course began on 5/2/2024 when he was admitted to the ICU following a catheterization that included an SVG to PDA intervention, with intra-aortic balloon pump (IABP) support. He was awake, conversant, and stable with no  "ischemic events in the lower extremity. His lactic acid levels were elevated, prompting the initiation of dobutamine. He experienced a drop in blood pressure due to nitrates and losartan, requiring ongoing IABP support, which he tolerated well without chest pain or shortness of breath.    On 5/3/2024, further management included adjustments to his IABP to a 1:3 setting. He had no chest pain or shortness of breath and remained hemodynamically stable with stable hematocrit and creatinine levels.    By 5/4/2024, Mr. Palacio reported feeling better, denying any symptoms of CHF or angina. His creatinine was trending down to 1.5, with no evidence of groin hematoma and cessation of bleeding from the central line. The IABP was successfully removed the day prior without incident.    On 5/5/2024, he continued to feel well, reporting no chest pain or shortness of breath. His creatinine slightly increased to 1.6, and his BNP was mildly elevated, indicating manageable cardiac stress. His CVP was stable at 8.    5/6/2024: His creatinine increased marginally to 1.7, leading to the temporary withholding of Aldactone. He showed no signs of congestive pathology. LifeVest was ordered by Cardiology for sudden cardiac death prevention.     Interval Hx  Doing well this AM. No complaints. No chest pain or shoulder pain. Discussed case with Cardiology    Objective  BP (!) 122/58 (BP Location: Right arm, Patient Position: Lying)   Pulse (!) 57   Temp 97.8 °F (36.6 °C) (Oral)   Resp 18   Ht 5' 5" (1.651 m)   Wt 71.1 kg (156 lb 12 oz)   SpO2 97%   BMI 26.08 kg/m²     Intake/Output Summary (Last 24 hours) at 5/6/2024 1324  Last data filed at 5/5/2024 1851  Gross per 24 hour   Intake --   Output 800 ml   Net -800 ml       PHYSICAL EXAM  Vitals reviewed  Constitutional:       General: He is not in acute distress.     Appearance: Normal appearance. He is well-developed. He is not diaphoretic.      Neck: IJ TLC in place   Cardiovascular: " "     Rate and Rhythm: Normal rate and regular rhythm.      Chest Wall: PMI is not displaced.      Pulses: Intact distal pulses.      Heart sounds: Normal heart sounds. No murmur heard.     No friction rub. No gallop. No S3 or S4 sounds.   Pulmonary:      Effort: Pulmonary effort is normal. No respiratory distress.      Breath sounds: coarse crackles no wheezing   Abdominal:      General: There is no distension.   Musculoskeletal:      Right lower leg: No edema.      Left lower leg: No edema.   Skin:     General: Skin is warm and dry.      Findings: No erythema.   Neurological:      General: No focal deficit present.      Mental Status: He is alert and oriented to person, place, and time.   Psychiatric:         Mood and Affect: Mood normal.         Behavior: Behavior normal.     LABS  All labs from the past 24 hours were reviewed.     BMP:   Recent Labs   Lab 05/06/24  0509   *   *   K 4.3      CO2 22*   BUN 33*   CREATININE 1.7*   CALCIUM 9.4     CBC:   Recent Labs   Lab 05/05/24  0442 05/06/24  0509   WBC 10.54  10.54 10.63   HGB 13.1*  13.1* 14.2   HCT 39.8*  39.8* 43.9     224 244     CMP:   Recent Labs   Lab 05/05/24  0442 05/06/24  0509    135*   K 4.2 4.3    102   CO2 23 22*   * 162*   BUN 28* 33*   CREATININE 1.6* 1.7*   CALCIUM 9.1 9.4   PROT 6.6  --    ALBUMIN 3.0*  --    BILITOT 0.9  --    ALKPHOS 59  --    AST 21  --    ALT 14  --    ANIONGAP 9 11     Cardiac Markers:   Recent Labs   Lab 05/05/24  0442   *     Coagulation: No results for input(s): "PT", "INR", "APTT" in the last 48 hours.  Lactic Acid:   Recent Labs   Lab 05/06/24  0843   LACTATE 1.8     Magnesium: No results for input(s): "MG" in the last 48 hours.  Troponin:   Recent Labs   Lab 05/06/24  1012   TROPONINI 11.083*     TSH:   Recent Labs   Lab 03/25/24  1424   TSH 1.46     Urine Studies:   No results for input(s): "COLORU", "APPEARANCEUA", "PHUR", "SPECGRAV", "PROTEINUA", "GLUCUA", " ""KETONESU", "BILIRUBINUA", "OCCULTUA", "NITRITE", "UROBILINOGEN", "LEUKOCYTESUR", "RBCUA", "WBCUA", "BACTERIA", "SQUAMEPITHEL", "HYALINECASTS" in the last 48 hours.    Invalid input(s): "WRIGHTSUR"    IMAGING  All imaging from the past 24 hours were reviewed.     Imaging Results              X-Ray Chest AP Portable (Final result)  Result time 05/02/24 07:28:52      Final result by Eric Glasgow MD (05/02/24 07:28:52)                   Impression:      As above      Electronically signed by: Eric Glasgow MD  Date:    05/02/2024  Time:    07:28               Narrative:    EXAMINATION:  XR CHEST AP PORTABLE    CLINICAL HISTORY:  Chest Pain;    FINDINGS:  Single view of the chest.  Comparison 11/24/2022.    Cardiac silhouette is enlarged but stable.  Status post CABG.  Sternotomy wires are intact.  Streaky infiltrates are seen throughout the lungs likely reflecting mild pulmonary edema.  Interstitial pneumonia thought to be less likely.  No evidence of pleural effusion or pneumothorax.  Bones appear intact.  Age-indeterminate left-sided 4th rib fracture again noted.  Moderate degenerative changes and moderate atherosclerotic disease.                                      Assessment/Plan:    * NSTEMI (non-ST elevated myocardial infarction)  --s/p C with stents to vein graft and IABP placement 5/2  --Iabp removed on 5/4 05/06/2024  -continues to be asymptomatic  -discussed case with Cardiology, LifeVest pending  -monitor overnight      Acute combined systolic and diastolic congestive heart failure  Cardiogenic shock - resolved  05/06/2024  --HDS since step down from the unit  --BB, po Lasix, ARB restarted. Tolerating well  --LifeVest ordered by Cards, pending    CKD  --Cr at baseline, GFR moderately decreased from previous lab draws  --avoid nephrotoxic agents where possible  --renally dose meds  --daily BMP to monitor renal fxn     Diabetes mellitus with hyperglycemia  5/6/24  --last A1c 6.4 on 05/02/2024  --AM " fasting glucose slightly higher than goal (152)  --holding home PO medications for now  --continue basal insulin as ordered  --Accuchecks [facility]         CORE MEASURES:  VTE Risk Mitigation (From admission, onward)           Ordered     IP VTE HIGH RISK PATIENT  Once         05/02/24 0324     Place sequential compression device  Until discontinued         05/02/24 0324                    Code Status: FULL    Diet: Diet Cardiac    Disposition: pending life vest, cardiology clearance       Chan Jimenes MD  Department of Hospital Medicine   [facility] - Telemetry ([facility])

## 2024-05-06 NOTE — NURSING TRANSFER
Nursing Transfer Note      5/6/2024   2:19 AM    Nurse giving handoff: Mone PERKINS  Nurse receiving handoff:Alvaro PERKINS    Reason patient is being transferred: Lower level of care    Transfer From: ICU    Transfer via wheelchair    Transfer with cardiac monitoring    Transported by RN    Transfer Vital Signs:  Blood Pressure: 136/65  Heart Rate:63  O2:93 RA  Temperature:98.4  Respirations:16    Telemetry: 8568  Order for Tele Monitor? Yes    Additional Lines: n/a    4eyes on Skin: yes    Medicines sent: Insulin    Any special needs or follow-up needed: no    Patient belongings transferred with patient: Yes    Chart send with patient: Yes    Notified: spouse- per patient      1  Upon arrival to floor: cardiac monitor applied, patient oriented to room, call bell in reach, and bed in lowest position

## 2024-05-06 NOTE — PLAN OF CARE
Problem: Acute Coronary Syndrome  Goal: Normalized Cardiac Rhythm  Outcome: Progressing     Problem: Cardiac Catheterization (Diagnostic/Interventional)  Goal: Absence of Bleeding  Outcome: Progressing     Problem: Adult Inpatient Plan of Care  Goal: Plan of Care Review  Outcome: Progressing     Problem: Diabetes Comorbidity  Goal: Blood Glucose Level Within Targeted Range  Outcome: Progressing     Problem: Fall Injury Risk  Goal: Absence of Fall and Fall-Related Injury  Outcome: Progressing

## 2024-05-06 NOTE — ASSESSMENT & PLAN NOTE
Secondary to nstemi   Echo    Interpretation Summary    Left Ventricle: The left ventricle is normal in size. Normal wall thickness. Regional wall motion abnormalities present. See diagram for wall motion findings. Septal motion is consistent with bundle branch block. There is moderately reduced systolic function with a visually estimated ejection fraction of 30 - 40%. Ejection fraction by visual approximation is 30%. Biplane (2D) method of discs ejection fraction is 26%. There is diastolic dysfunction but grade cannot be determined.    Right Ventricle: Normal right ventricular cavity size. Wall thickness is normal. Systolic function is normal.    Left Atrium: Left atrium is moderately dilated.    Mitral Valve: There is moderate regurgitation.    Pulmonary Artery: The estimated pulmonary artery systolic pressure is 31 mmHg.    IVC/SVC: Intermediate venous pressure at 8 mmHg.    Recent Labs   Lab 05/05/24  0442   *       Patient was diuresed revascularized inotropes started for lactic acidosis.     5/4/2024  Lactic acidosis resolved   Will stop inotropes.    5/5/2024   ADD LOW DOSE DIURETICS    5/6/24  -Continue BB, po Lasix, ARB  -Needs LifeVest for SCD prevention EF 30%

## 2024-05-06 NOTE — HOSPITAL COURSE
"Mr. Palacio was admitted to the ICU on May 2, 2024, following a catheterization that involved an SVG to PDA intervention, supported by an intra-aortic balloon pump (IABP). Upon admission, he was alert, conversant, and free from ischemic events in the lower extremities. Elevated lactic acid levels prompted the initiation of dobutamine. He experienced a drop in blood pressure due to the effects of nitrates and losartan, necessitating continued IABP support, which he tolerated well without any chest pain or shortness of breath.    On May 3, adjustments were made to the IABP setting to 1:3. Mr. Palacio remained stable, without chest pain or shortness of breath, and showed stable hematocrit and creatinine levels. By May 4, he reported feeling better, denying any symptoms of congestive heart failure (CHF) or angina. His creatinine levels improved to 1.5, with no signs of groin hematoma or bleeding from the central line, and the IABP was successfully removed without incident.    The following day, Mr. Palacio continued to feel well, though his creatinine levels evelyn slightly to 1.6, and his brain natriuretic peptide (BNP) was mildly elevated, indicating manageable cardiac stress. His central venous pressure (CVP) remained stable at 8. On May 6, his creatinine increased marginally to 1.7, leading to a temporary discontinuation of Aldactone. He showed no signs of congestive pathology, and a LifeVest was ordered by the cardiology team for sudden cardiac death prevention.    /62   Pulse 68   Temp 98 °F (36.7 °C)   Resp 17   Ht 5' 5" (1.651 m)   Wt 73.4 kg (161 lb 13.1 oz)   SpO2 (!) 92%   BMI 26.93 kg/m²     "

## 2024-05-07 LAB
ANION GAP SERPL CALC-SCNC: 10 MMOL/L (ref 8–16)
ANION GAP SERPL CALC-SCNC: 11 MMOL/L (ref 8–16)
BASOPHILS # BLD AUTO: 0.1 K/UL (ref 0–0.2)
BASOPHILS NFR BLD: 1 % (ref 0–1.9)
BNP SERPL-MCNC: 314 PG/ML (ref 0–99)
BUN SERPL-MCNC: 43 MG/DL (ref 8–23)
BUN SERPL-MCNC: 44 MG/DL (ref 8–23)
CALCIUM SERPL-MCNC: 9.2 MG/DL (ref 8.7–10.5)
CALCIUM SERPL-MCNC: 9.4 MG/DL (ref 8.7–10.5)
CHLORIDE SERPL-SCNC: 101 MMOL/L (ref 95–110)
CHLORIDE SERPL-SCNC: 99 MMOL/L (ref 95–110)
CO2 SERPL-SCNC: 24 MMOL/L (ref 23–29)
CO2 SERPL-SCNC: 25 MMOL/L (ref 23–29)
CREAT SERPL-MCNC: 2.3 MG/DL (ref 0.5–1.4)
CREAT SERPL-MCNC: 2.5 MG/DL (ref 0.5–1.4)
DIFFERENTIAL METHOD BLD: ABNORMAL
EOSINOPHIL # BLD AUTO: 0.5 K/UL (ref 0–0.5)
EOSINOPHIL NFR BLD: 5.1 % (ref 0–8)
ERYTHROCYTE [DISTWIDTH] IN BLOOD BY AUTOMATED COUNT: 14.7 % (ref 11.5–14.5)
EST. GFR  (NO RACE VARIABLE): 26 ML/MIN/1.73 M^2
EST. GFR  (NO RACE VARIABLE): 29 ML/MIN/1.73 M^2
GLUCOSE SERPL-MCNC: 186 MG/DL (ref 70–110)
GLUCOSE SERPL-MCNC: 242 MG/DL (ref 70–110)
HCT VFR BLD AUTO: 44.5 % (ref 40–54)
HGB BLD-MCNC: 14.3 G/DL (ref 14–18)
IMM GRANULOCYTES # BLD AUTO: 0.15 K/UL (ref 0–0.04)
IMM GRANULOCYTES NFR BLD AUTO: 1.6 % (ref 0–0.5)
LYMPHOCYTES # BLD AUTO: 1.4 K/UL (ref 1–4.8)
LYMPHOCYTES NFR BLD: 15.1 % (ref 18–48)
MCH RBC QN AUTO: 28.4 PG (ref 27–31)
MCHC RBC AUTO-ENTMCNC: 32.1 G/DL (ref 32–36)
MCV RBC AUTO: 89 FL (ref 82–98)
MONOCYTES # BLD AUTO: 0.9 K/UL (ref 0.3–1)
MONOCYTES NFR BLD: 9.7 % (ref 4–15)
NEUTROPHILS # BLD AUTO: 6.5 K/UL (ref 1.8–7.7)
NEUTROPHILS NFR BLD: 67.5 % (ref 38–73)
NRBC BLD-RTO: 0 /100 WBC
PLATELET # BLD AUTO: 283 K/UL (ref 150–450)
PMV BLD AUTO: 10.2 FL (ref 9.2–12.9)
POCT GLUCOSE: 175 MG/DL (ref 70–110)
POCT GLUCOSE: 200 MG/DL (ref 70–110)
POCT GLUCOSE: 225 MG/DL (ref 70–110)
POCT GLUCOSE: 323 MG/DL (ref 70–110)
POTASSIUM SERPL-SCNC: 4 MMOL/L (ref 3.5–5.1)
POTASSIUM SERPL-SCNC: 5.3 MMOL/L (ref 3.5–5.1)
RBC # BLD AUTO: 5.03 M/UL (ref 4.6–6.2)
SODIUM SERPL-SCNC: 134 MMOL/L (ref 136–145)
SODIUM SERPL-SCNC: 136 MMOL/L (ref 136–145)
WBC # BLD AUTO: 9.56 K/UL (ref 3.9–12.7)

## 2024-05-07 PROCEDURE — 80048 BASIC METABOLIC PNL TOTAL CA: CPT | Mod: 91 | Performed by: PHYSICIAN ASSISTANT

## 2024-05-07 PROCEDURE — 63600175 PHARM REV CODE 636 W HCPCS: Performed by: STUDENT IN AN ORGANIZED HEALTH CARE EDUCATION/TRAINING PROGRAM

## 2024-05-07 PROCEDURE — 36415 COLL VENOUS BLD VENIPUNCTURE: CPT | Performed by: INTERNAL MEDICINE

## 2024-05-07 PROCEDURE — 97110 THERAPEUTIC EXERCISES: CPT

## 2024-05-07 PROCEDURE — 25000003 PHARM REV CODE 250: Performed by: INTERNAL MEDICINE

## 2024-05-07 PROCEDURE — 83880 ASSAY OF NATRIURETIC PEPTIDE: CPT | Performed by: PHYSICIAN ASSISTANT

## 2024-05-07 PROCEDURE — 21400001 HC TELEMETRY ROOM

## 2024-05-07 PROCEDURE — 36415 COLL VENOUS BLD VENIPUNCTURE: CPT | Mod: XB | Performed by: PHYSICIAN ASSISTANT

## 2024-05-07 PROCEDURE — 85025 COMPLETE CBC W/AUTO DIFF WBC: CPT | Performed by: INTERNAL MEDICINE

## 2024-05-07 PROCEDURE — 97116 GAIT TRAINING THERAPY: CPT

## 2024-05-07 PROCEDURE — 99232 SBSQ HOSP IP/OBS MODERATE 35: CPT | Mod: ,,, | Performed by: PHYSICIAN ASSISTANT

## 2024-05-07 RX ADMIN — ASPIRIN 81 MG CHEWABLE TABLET 81 MG: 81 TABLET CHEWABLE at 09:05

## 2024-05-07 RX ADMIN — TICAGRELOR 90 MG: 90 TABLET ORAL at 09:05

## 2024-05-07 RX ADMIN — FERROUS SULFATE TAB 325 MG (65 MG ELEMENTAL FE) 1 EACH: 325 (65 FE) TAB at 09:05

## 2024-05-07 RX ADMIN — SODIUM CHLORIDE, POTASSIUM CHLORIDE, SODIUM LACTATE AND CALCIUM CHLORIDE 250 ML: 600; 310; 30; 20 INJECTION, SOLUTION INTRAVENOUS at 06:05

## 2024-05-07 RX ADMIN — LEVOTHYROXINE SODIUM 50 MCG: 50 TABLET ORAL at 05:05

## 2024-05-07 RX ADMIN — INSULIN DETEMIR 11 UNITS: 100 INJECTION, SOLUTION SUBCUTANEOUS at 09:05

## 2024-05-07 RX ADMIN — MUPIROCIN: 20 OINTMENT TOPICAL at 09:05

## 2024-05-07 RX ADMIN — INSULIN ASPART 8 UNITS: 100 INJECTION, SOLUTION INTRAVENOUS; SUBCUTANEOUS at 06:05

## 2024-05-07 RX ADMIN — TAMSULOSIN HYDROCHLORIDE 0.4 MG: 0.4 CAPSULE ORAL at 09:05

## 2024-05-07 NOTE — PLAN OF CARE
Pt tolerated interventions well. Ambulated >1000ft INDEP, no AD. At this time, patient is functioning at their prior level of function and does not require further acute PT services. Please re-consult if situation changes. Recommending home upon d/c.

## 2024-05-07 NOTE — ASSESSMENT & PLAN NOTE
-Patient with history of CAD s/p remote CABG who presents with L shoulder pain/CP and elevated troponin---consistent with NSTEMI  -Troponin 0.072>1.759>41.611, repeat pending  -EKG reviewed, ischemic changes with QRS widening in multiple leads  -Continue ASA, heparin gtt, CCB, BB  -Patient intolerant to statin  -R/LHC planned today pending creatinine trend    5/3/2024  Had svg to pda intervention no complication with IABP support.no angina optimal therapy on board    5/4/2024  Iabp removed no angina will adjust meds.    5/5/2024 NO ANGINA    5/7/24  -Stable  -No CP  -Continue ASA, statin, BB, Brilinta

## 2024-05-07 NOTE — ASSESSMENT & PLAN NOTE
See nstemi     Andie djust optimize medical therapy and wean off iabp.    5/4/2024   Iabp removed will increase afterload add aldactone.  5/5/2024   IS ON AFTERLOAD ALDACTONE AND B BLOCKERS. ANDIE DD LOW DOSE DIURETICS    5/6/24  -Stable  -Continue BB, low dose Lasix, ARB  -LifeVest for  SCD prevention    5/7/24  -No CP symptoms  -Continue BB as pulse permits  -Lasix, ARB held due to bumped creatinine

## 2024-05-07 NOTE — ASSESSMENT & PLAN NOTE
-BNP elevated and CXR with congestion  -Gently diurese  -Preliminary review of TTE with depressed EF, official report to follow  -Continue BB  -Will further optimize regimen as tolerated  -R/LHC tentatively planned today    5/4/2024   Resolved will discontinue inotropes.    5/5/2024 ADD LOW DOSE DIURETICS    5/6/24  -Continue po Lasix, ARB, BB  -Hold Aldactone due to bumped creatinine    5/7/24  -Lasix, ARB held due to bumped creatinine  -Continue BB as pulse permits

## 2024-05-07 NOTE — ASSESSMENT & PLAN NOTE
-Continue CCB, BB  -Will optimize meds as tolerated    5/4/2024   Stop amlodipine increase b blockers and afterload.    5/6/24  -Continue BB, ARB  -Will consider switching to Entresto tmw    5/7/25  -ARB held  -BB as pulse permits---bradycardic this AM

## 2024-05-07 NOTE — DISCHARGE INSTRUCTIONS
1- Follow up with you primary care within 1-2 weeks of discharge to repeat labs and reassess renal function  2- HOLD your Metformin medication until this follow up appointment as this is can put strain on your kidneys as well.

## 2024-05-07 NOTE — PLAN OF CARE
Problem: Diabetes Comorbidity  Goal: Blood Glucose Level Within Targeted Range  Outcome: Progressing

## 2024-05-07 NOTE — PROGRESS NOTES
[facility] - Telemetry ([facility])  Cardiology  Progress Note    Patient Name: Nahum Palacio  MRN: [id number]  Admission Date: 5/1/2024  Hospital Length of Stay: 5 days  Code Status: Full Code   Attending Physician: Chan Jimenes MD   Primary Care Physician: Eric Foster MD  Expected Discharge Date:   Principal Problem:NSTEMI (non-ST elevated myocardial infarction)    Subjective:   HPI:  Mr. Palacio is a 73 year old male patient whose current medical conditions include CAD s/p remote CABG in 2006 (LIMA-LAD, SVG-Ramus, SVG-RCA), hyperlipidemia, HTN, and statin intolerance who presented to [facility] ED overnight due to persistent heavy left shoulder pain. Associated symptoms included jaw discomfort, nausea, and pain near his left pectoral muscle. He denied any associated vomiting, diaphoresis, palpitations, near syncope, or syncope. Reported he had been dealing with left shoulder pain for quite some time due to prior fall and thought it was due to MSK/arthritic pain but became concerned when pain persisted despite warm compresses/hot shower. Initial workup in ED revealed troponin of 0.072>0.1759 and BNP of 978. Patient subsequently admitted for further evaluation and treatment. Cardiology consulted to assist with management. Patient seen and examined today, resting in bed. Feeling better. Denies any left shoulder pain/CP during exam. No other CV complaints. He reports compliance with his medications. Followed on OP basis by Dr. [place]. Troponin bumped up to 41. EKG reviewed, shows ischemic changes/QRS widening in multiple leads. Preliminary bedside read of TTE by MD revealed depressed EF. Plans for R/LHC today pending repeat BMP.        Hospital Course:   5/3/2024 had svg to pda intervention with iabp support . His cr stable hct stable has no ischemia fly lower extremity. His lactic acid is [id number] dobutamine started. He received nitrates and losartan dropped his bp .iabp on 1:3   No chest pian or shortness of  breath tolerated iabp well.    5/4/2024 feels better this morning denies any chf symptoms or angina . Cr 1.5 trending down.no groin hematoma. Bleeding from central line stopped.    5/5/2024 HE FEELS WELL THIS MORNING. HE AHS NO CHEST PAIN OR SHORTNESS OF BREATH HIS CR 1.6.HIS BNP IS MILDLY INCREASED .CVP 8     5/6/24-Patient seen and examined today, resting comfortably in bed. Admits to some mild SOB that is improving. No CP.  Creatinine bumped to 1.7, Aldactone held. LA WNL. Needs LifeVest for SCD prevention.    5/7/24-Patient seen and examined today, sitting up in bedside chair. Feels well. No CV complaints. No CP/SOB. Creatinine bumped to 2.3, K 5.3. Diuretic/ARB held, repeat labs scheduled for this afternoon. Awaiting LifeVest.      Review of Systems   Constitutional: Positive for malaise/fatigue.   HENT: Negative.     Eyes: Negative.    Cardiovascular: Negative.    Respiratory: Negative.     Endocrine: Negative.    Hematologic/Lymphatic: Negative.    Skin: Negative.    Musculoskeletal: Negative.    Gastrointestinal: Negative.    Genitourinary: Negative.    Neurological: Negative.    Psychiatric/Behavioral: Negative.     Allergic/Immunologic: Negative.      Objective:     Vital Signs (Most Recent):  Temp: 97.8 °F (36.6 °C) (05/07/24 1203)  Pulse: (!) 52 (05/07/24 1203)  Resp: 18 (05/07/24 1203)  BP: (!) 123/58 (05/07/24 1203)  SpO2: (!) 91 % (05/07/24 1203) Vital Signs (24h Range):  Temp:  [97.8 °F (36.6 °C)-98.5 °F (36.9 °C)] 97.8 °F (36.6 °C)  Pulse:  [52-69] 52  Resp:  [15-18] 18  SpO2:  [91 %-97 %] 91 %  BP: (100-128)/(55-64) 123/58     Weight: 71.1 kg (156 lb 12 oz)  Body mass index is 26.08 kg/m².     SpO2: (!) 91 %       No intake or output data in the 24 hours ending 05/07/24 1406    Lines/Drains/Airways       Peripheral Intravenous Line  Duration                  Peripheral IV - Single Lumen 05/01/24 2329 20 G Right Antecubital 5 days         Peripheral IV - Single Lumen 05/02/24 0315 20 G Left  Antecubital 5 days         Peripheral IV - Single Lumen 05/05/24 1600 20 G Anterior;Right Forearm 1 day                       Physical Exam  Vitals and nursing note reviewed.   Constitutional:       General: He is not in acute distress.     Appearance: Normal appearance. He is well-developed. He is not diaphoretic.   HENT:      Head: Normocephalic and atraumatic.   Eyes:      General:         Right eye: No discharge.         Left eye: No discharge.      Pupils: Pupils are equal, round, and reactive to light.   Cardiovascular:      Rate and Rhythm: Normal rate and regular rhythm.      Heart sounds: Normal heart sounds, S1 normal and S2 normal. No murmur heard.  Pulmonary:      Effort: Pulmonary effort is normal. No respiratory distress.      Breath sounds: Normal breath sounds.   Abdominal:      General: There is no distension.   Musculoskeletal:      Right lower leg: No edema.      Left lower leg: No edema.   Skin:     General: Skin is warm and dry.      Findings: No erythema.   Neurological:      General: No focal deficit present.      Mental Status: He is alert and oriented to person, place, and time.   Psychiatric:         Mood and Affect: Mood normal.         Behavior: Behavior normal.            Significant Labs: CMP   Recent Labs   Lab 05/06/24  0509 05/07/24  0531   * 136   K 4.3 5.3*    101   CO2 22* 25   * 186*   BUN 33* 43*   CREATININE 1.7* 2.3*   CALCIUM 9.4 9.2   ANIONGAP 11 10   , CBC   Recent Labs   Lab 05/06/24  0509 05/07/24  0531   WBC 10.63 9.56   HGB 14.2 14.3   HCT 43.9 44.5    283   , Troponin   Recent Labs   Lab 05/06/24  1012   TROPONINI 11.083*   , and All pertinent lab results from the last 24 hours have been reviewed.    Significant Imaging: Echocardiogram: Transthoracic echo (TTE) complete (Cupid Only):   Results for orders placed or performed during the hospital encounter of 05/01/24   Echo   Result Value Ref Range    BSA 1.88 m2    Pineda's Biplane MOD  Ejection Fraction 26 %    LVOT stroke volume 47.18 cm3    LVIDd 5.64 3.5 - 6.0 cm    LV Systolic Volume 110.97 mL    LV Systolic Volume Index 60.0 mL/m2    LVIDs 4.87 (A) 2.1 - 4.0 cm    LV Diastolic Volume 156.29 mL    LV Diastolic Volume Index 84.48 mL/m2    IVS 0.95 0.6 - 1.1 cm    LVOT diameter 2.05 cm    LVOT area 3.3 cm2    FS 14 (A) 28 - 44 %    Left Ventricle Relative Wall Thickness 0.25 cm    Posterior Wall 0.71 0.6 - 1.1 cm    LV mass 175.00 g    LV Mass Index 95 g/m2    MV Peak E Chandra 0.50 m/s    TDI SEPTAL 0.04 m/s    MV Peak A Chandra 0.61 m/s    TR Max Chandra 2.38 m/s    E/A ratio 0.82     IVRT 110.37 msec    E wave deceleration time 146.15 msec    LV SEPTAL E/E' RATIO 12.50 m/s    LVOT peak chandra 0.72 m/s    Left Ventricular Outflow Tract Mean Velocity 0.51 cm/s    Left Ventricular Outflow Tract Mean Gradient 1.19 mmHg    RVDD 3.43 cm    RVOT peak VTI 12.2 cm    TAPSE 1.34 cm    RV/LV Ratio 0.61 cm    LA size 4.33 cm    Left Atrium Minor Axis 4.94 cm    Left Atrium Major Axis 5.34 cm    RA Major Axis 4.26 cm    Vn Nyquist MS 0.33 m/s    AV mean gradient 2 mmHg    AV peak gradient 4 mmHg    Ao peak chandra 1.05 m/s    Ao VTI 20.30 cm    LVOT peak VTI 14.30 cm    AV valve area 2.32 cm²    AV Velocity Ratio 0.69     AV index (prosthetic) 0.70     ANTWAN by Velocity Ratio 2.26 cm²    Radius 0.65 cm    Vn Nyquist 0.33 m/s    Mr max chandra 5.48 m/s    MR PISA EROA 0.16 cm2    MV stenosis pressure 1/2 time 42.38 ms    MV valve area p 1/2 method 5.19 cm2    Triscuspid Valve Regurgitation Peak Gradient 23 mmHg    PV mean gradient 1 mmHg    PV PEAK VELOCITY 0.79 m/s    PV peak gradient 2 mmHg    RVOT peak chandra 0.65 m/s    Ao root annulus 3.11 cm    STJ 2.91 cm    Ascending aorta 3.13 cm    IVC diameter 1.92 cm    ZLVIDS 3.39     ZLVIDD 0.93     LA Volume Index 43.9 mL/m2    LA volume 81.22 cm3    LA WIDTH 4.3 cm    RA Width 3.5 cm    EF 30 %    TV resting pulmonary artery pressure 31 mmHg    RV TB RVSP 10 mmHg    Est. RA pres 8  mmHg    Narrative      Left Ventricle: The left ventricle is normal in size. Normal wall   thickness. Regional wall motion abnormalities present. See diagram for   wall motion findings. Septal motion is consistent with bundle branch   block. There is moderately reduced systolic function with a visually   estimated ejection fraction of 30 - 40%. Ejection fraction by visual   approximation is 30%. Biplane (2D) method of discs ejection fraction is   26%. There is diastolic dysfunction but grade cannot be determined.    Right Ventricle: Normal right ventricular cavity size. Wall thickness   is normal. Systolic function is normal.    Left Atrium: Left atrium is moderately dilated.    Mitral Valve: There is moderate regurgitation.    Pulmonary Artery: The estimated pulmonary artery systolic pressure is   31 mmHg.    IVC/SVC: Intermediate venous pressure at 8 mmHg.      and EKG: Reviewed  Assessment and Plan:   Patient who presents with NSTEMI/ICM. Creatinine bumped to 2.3, appears dry. ARB/diuretic held. Repeat BMP/BNP ordered this afternoon. Awaiting LifeVest.    * NSTEMI (non-ST elevated myocardial infarction)  -Patient with history of CAD s/p remote CABG who presents with L shoulder pain/CP and elevated troponin---consistent with NSTEMI  -Troponin 0.072>1.759>41.611, repeat pending  -EKG reviewed, ischemic changes with QRS widening in multiple leads  -Continue ASA, heparin gtt, CCB, BB  -Patient intolerant to statin  -R/LHC planned today pending creatinine trend    5/3/2024  Had svg to pda intervention no complication with IABP support.no angina optimal therapy on board    5/4/2024  Iabp removed no angina will adjust meds.    5/5/2024 NO ANGINA    5/7/24  -Stable  -No CP  -Continue ASA, statin, BB, Brilinta    Acute combined systolic and diastolic congestive heart failure  Secondary to nstemi   Echo    Interpretation Summary    Left Ventricle: The left ventricle is normal in size. Normal wall thickness. Regional wall  motion abnormalities present. See diagram for wall motion findings. Septal motion is consistent with bundle branch block. There is moderately reduced systolic function with a visually estimated ejection fraction of 30 - 40%. Ejection fraction by visual approximation is 30%. Biplane (2D) method of discs ejection fraction is 26%. There is diastolic dysfunction but grade cannot be determined.    Right Ventricle: Normal right ventricular cavity size. Wall thickness is normal. Systolic function is normal.    Left Atrium: Left atrium is moderately dilated.    Mitral Valve: There is moderate regurgitation.    Pulmonary Artery: The estimated pulmonary artery systolic pressure is 31 mmHg.    IVC/SVC: Intermediate venous pressure at 8 mmHg.    Recent Labs   Lab 05/05/24  0442   *       Patient was diuresed revascularized inotropes started for lactic acidosis.     5/4/2024  Lactic acidosis resolved   Will stop inotropes.    5/5/2024   ADD LOW DOSE DIURETICS    5/6/24  -Continue BB, po Lasix, ARB  -Needs LifeVest for SCD prevention EF 30%    5/7/24  -Appears on dry side  -ARB, po Lasix held  -BNP, BMP pending at 3 PM    Ischemic cardiomyopathy  See nstemi     Andie djust optimize medical therapy and wean off iabp.    5/4/2024   Iabp removed will increase afterload add aldactone.  5/5/2024   IS ON AFTERLOAD ALDACTONE AND B BLOCKERS. ANDIE DD LOW DOSE DIURETICS    5/6/24  -Stable  -Continue BB, low dose Lasix, ARB  -LifeVest for  SCD prevention    5/7/24  -No CP symptoms  -Continue BB as pulse permits  -Lasix, ARB held due to bumped creatinine    CKD (chronic kidney disease)  -Creatinine 1.8, repeat pending    5/3/2024  Continue monitoring    5/4/2024  Cr improving    5/5/2024  CR IS 1.6 ANDIE DD LOW DOSE DIURETICS    5/6/24  -Creatinine 1.7, monitor    5/7/24  -Creatinine bumped to 2.3, repeat BMP scheduled for 3 PM    Acute exacerbation of CHF (congestive heart failure)  -BNP elevated and CXR with congestion  -Gently  diurese  -Preliminary review of TTE with depressed EF, official report to follow  -Continue BB  -Will further optimize regimen as tolerated  -R/LHC tentatively planned today    5/4/2024   Resolved will discontinue inotropes.    5/5/2024 ADD LOW DOSE DIURETICS    5/6/24  -Continue po Lasix, ARB, BB  -Hold Aldactone due to bumped creatinine    5/7/24  -Lasix, ARB held due to bumped creatinine  -Continue BB as pulse permits    Hypertension  -Continue CCB, BB  -Will optimize meds as tolerated    5/4/2024   Stop amlodipine increase b blockers and afterload.    5/6/24  -Continue BB, ARB  -Will consider switching to Entresto tmw    5/7/25  -ARB held  -BB as pulse permits---bradycardic this AM    Diabetes mellitus  -Mgmt as per hospital medicine    5/5/2024 WILL BENEFIT FROM FARXIGA WILL WATCH RENAL FUNCTION FIRST.    Coronary artery disease  -See plan under NSTEMI        VTE Risk Mitigation (From admission, onward)           Ordered     IP VTE HIGH RISK PATIENT  Once         05/02/24 0324     Place sequential compression device  Until discontinued         05/02/24 0324                    Christelle Gonzalez PA-C  Cardiology  [facility] - Telemetry ([facility])

## 2024-05-07 NOTE — PLAN OF CARE
Per Pancho, with [employer], Patient's insurance has to get amended notes from Cardiology specificing the EF of Patient and the Cath report to finalize approval of LifeVest. DIANA stated the EF clarification was in the latest Cardiology note and Pancho stated he would double check. DIANA stated she would send anything additional needed. Pancho stated they did need the Cath report, per cardiology. DIANA sent Inpatient OP Note to Pancho, via fax.     10 03 Fax confirmation received via email, 10 02.    11 58    05/07/24 2009   Post-Acute Status   Post-Acute Authorization E   E Status Set-up Complete/Auth obtained   Coverage [employer]   Discharge Delays None known at this time   Discharge Plan   Discharge Plan A Home with family     Per Pancho, with [employer], patient's LifeVest has been approved and he can be fitted today.  DIANA requested Pancho let her know once [employer] nurse was able to come fit at bedside. Pancho verbalized understanding and stated he would let SW know.    12 00 Per Pancho, with [employer], [employer] nurse can be at bedside around 2 pm for fitting.     14 50 Per bedside nurse, [employer] nurse is at beside completing bedside fitting.     15 49 Per Bedside nurse, Patient's LifeVest is delivered at bedside. Per hospital medicine attending, patient's kidney functioning is worsening and they will keep another night for observation.    DIANA will continue to follow and assist as needed.

## 2024-05-07 NOTE — ASSESSMENT & PLAN NOTE
Secondary to nstemi   Echo    Interpretation Summary    Left Ventricle: The left ventricle is normal in size. Normal wall thickness. Regional wall motion abnormalities present. See diagram for wall motion findings. Septal motion is consistent with bundle branch block. There is moderately reduced systolic function with a visually estimated ejection fraction of 30 - 40%. Ejection fraction by visual approximation is 30%. Biplane (2D) method of discs ejection fraction is 26%. There is diastolic dysfunction but grade cannot be determined.    Right Ventricle: Normal right ventricular cavity size. Wall thickness is normal. Systolic function is normal.    Left Atrium: Left atrium is moderately dilated.    Mitral Valve: There is moderate regurgitation.    Pulmonary Artery: The estimated pulmonary artery systolic pressure is 31 mmHg.    IVC/SVC: Intermediate venous pressure at 8 mmHg.    Recent Labs   Lab 05/05/24  0442   *       Patient was diuresed revascularized inotropes started for lactic acidosis.     5/4/2024  Lactic acidosis resolved   Will stop inotropes.    5/5/2024   ADD LOW DOSE DIURETICS    5/6/24  -Continue BB, po Lasix, ARB  -Needs LifeVest for SCD prevention EF 30%    5/7/24  -Appears on dry side  -ARB, po Lasix held  -BNP, BMP pending at 3 PM

## 2024-05-07 NOTE — PT/OT/SLP PROGRESS
Physical Therapy Treatment and Discharge    Patient Name:  Nahum Palacio   MRN:  [id number]    Recommendations:     Discharge Recommendations: No Therapy Indicated  Discharge Equipment Recommendations: none  Barriers to discharge: None    Assessment:     Nahum Palacio is a 73 y.o. male admitted with a medical diagnosis of NSTEMI (non-ST elevated myocardial infarction). At this time, patient is functioning at their prior level of function and does not require further acute PT services.    Recent Surgery: Procedure(s) (LRB):  CATHETERIZATION, HEART, BOTH LEFT AND RIGHT (N/A)  ANGIOGRAM, ABDOMINAL AORTA  INSERTION, INTRA-AORTIC BALLOON PUMP  PTCA, Single Vessel  INSERTION, STENT, CORONARY ARTERY (N/A) 5 Days Post-Op    Plan:     During this hospitalization, patient does not require further acute PT services.  Please re-consult if situation changes.    Subjective     Chief Complaint: Pt is motivated to participate  Patient/Family Comments/goals: none stated  Pain/Comfort:  Pain Rating 1: 0/10      Objective:     Communicated with nurse Cagle and epic chart review prior to session.  Patient found supine with peripheral IV, telemetry upon PT entry to room.     General Precautions: Standard, fall  Orthopedic Precautions: N/A  Braces: N/A  Respiratory Status: Room air     Functional Mobility:  Gait belt applied - Yes  Bed Mobility  Rolling Left: independence  Scooting: independence  Supine to Sit: independence  Transfers  Sit to Stand: independence with no AD  Bed to Chair: independence with no AD using Step Transfer  Gait  Patient ambulated >1000ft with no AD and independence. Patient demonstrates steady gait. No c/o dizziness, mild SOB, educated about pursed lip breathing technique and cued for use with mobility. All lines remained intact throughout ambulation trail.  Balance  Sitting: independence  Standing: independence    Therapeutic Exercise  Patient performed 1 set(s) of 15 repetitions of the following seated  "exercises: ankle pumps, long arc quads, and marches for bilateral LE.   Patient required skilled PT for instruction of exercises and appropriate cues to perform exercises safely and appropriately.      AM-PAC 6 CLICK MOBILITY  Turning over in bed (including adjusting bedclothes, sheets and blankets)?: 4  Sitting down on and standing up from a chair with arms (e.g., wheelchair, bedside commode, etc.): 4  Moving from lying on back to sitting on the side of the bed?: 4  Moving to and from a bed to a chair (including a wheelchair)?: 4  Need to walk in hospital room?: 4  Climbing 3-5 steps with a railing?: 1 (NT)  Basic Mobility Total Score: 21       Treatment & Education:  Reviewed role of PT in acute care and POC. Pt tolerated interventions well. Reviewed importance of OOB activities, activity pacing, and HEP (marching/hip flex, hip abd, heel slides/LAQ, quad sets, ankle pumps) in order to maintain/regain strength. Encouraged to sit up in chair for all meals. Reviewed "call don't fall" policy and increased risk of falling due to weakness, instructed to utilize call bell for assistance with all transfers. Pt agreeable to all requests.    Patient left up in chair with all lines intact, call button in reach, and family present..    GOALS:   Multidisciplinary Problems       Physical Therapy Goals       Not on file              Multidisciplinary Problems (Resolved)          Problem: Physical Therapy    Goal Priority Disciplines Outcome Goal Variances Interventions   Physical Therapy Goal   (Resolved)     PT, PT/OT Met     Description: LTG'S TO BE MET IN 14 DAYS (5/18/24)  PT WILL REQUIRE mod I FOR BED MOBILITY  PT WILL REQUIRE mod I FOR BED<>CHAIR TF'S  PT WILL  FEET WITH RW AND SBA  PT WILL INC AMPAC SCORE BY 2 POINTS TO PROGRESS GROSS FUNC MOBILITY                         Time Tracking:     PT Received On: 05/07/24  PT Start Time: 0935     PT Stop Time: 1000  PT Total Time (min): 25 min     Billable Minutes: Gait " Training 17min and Therapeutic Exercise 8min    Treatment Type: Treatment  PT/PTA: PT     Number of PTA visits since last PT visit: 0     05/07/2024

## 2024-05-07 NOTE — SUBJECTIVE & OBJECTIVE
Review of Systems   Constitutional: Positive for malaise/fatigue.   HENT: Negative.     Eyes: Negative.    Cardiovascular: Negative.    Respiratory: Negative.     Endocrine: Negative.    Hematologic/Lymphatic: Negative.    Skin: Negative.    Musculoskeletal: Negative.    Gastrointestinal: Negative.    Genitourinary: Negative.    Neurological: Negative.    Psychiatric/Behavioral: Negative.     Allergic/Immunologic: Negative.      Objective:     Vital Signs (Most Recent):  Temp: 97.8 °F (36.6 °C) (05/07/24 1203)  Pulse: (!) 52 (05/07/24 1203)  Resp: 18 (05/07/24 1203)  BP: (!) 123/58 (05/07/24 1203)  SpO2: (!) 91 % (05/07/24 1203) Vital Signs (24h Range):  Temp:  [97.8 °F (36.6 °C)-98.5 °F (36.9 °C)] 97.8 °F (36.6 °C)  Pulse:  [52-69] 52  Resp:  [15-18] 18  SpO2:  [91 %-97 %] 91 %  BP: (100-128)/(55-64) 123/58     Weight: 71.1 kg (156 lb 12 oz)  Body mass index is 26.08 kg/m².     SpO2: (!) 91 %       No intake or output data in the 24 hours ending 05/07/24 1406    Lines/Drains/Airways       Peripheral Intravenous Line  Duration                  Peripheral IV - Single Lumen 05/01/24 2329 20 G Right Antecubital 5 days         Peripheral IV - Single Lumen 05/02/24 0315 20 G Left Antecubital 5 days         Peripheral IV - Single Lumen 05/05/24 1600 20 G Anterior;Right Forearm 1 day                       Physical Exam  Vitals and nursing note reviewed.   Constitutional:       General: He is not in acute distress.     Appearance: Normal appearance. He is well-developed. He is not diaphoretic.   HENT:      Head: Normocephalic and atraumatic.   Eyes:      General:         Right eye: No discharge.         Left eye: No discharge.      Pupils: Pupils are equal, round, and reactive to light.   Cardiovascular:      Rate and Rhythm: Normal rate and regular rhythm.      Heart sounds: Normal heart sounds, S1 normal and S2 normal. No murmur heard.  Pulmonary:      Effort: Pulmonary effort is normal. No respiratory distress.       Breath sounds: Normal breath sounds.   Abdominal:      General: There is no distension.   Musculoskeletal:      Right lower leg: No edema.      Left lower leg: No edema.   Skin:     General: Skin is warm and dry.      Findings: No erythema.   Neurological:      General: No focal deficit present.      Mental Status: He is alert and oriented to person, place, and time.   Psychiatric:         Mood and Affect: Mood normal.         Behavior: Behavior normal.            Significant Labs: CMP   Recent Labs   Lab 05/06/24  0509 05/07/24  0531   * 136   K 4.3 5.3*    101   CO2 22* 25   * 186*   BUN 33* 43*   CREATININE 1.7* 2.3*   CALCIUM 9.4 9.2   ANIONGAP 11 10   , CBC   Recent Labs   Lab 05/06/24  0509 05/07/24  0531   WBC 10.63 9.56   HGB 14.2 14.3   HCT 43.9 44.5    283   , Troponin   Recent Labs   Lab 05/06/24  1012   TROPONINI 11.083*   , and All pertinent lab results from the last 24 hours have been reviewed.    Significant Imaging: Echocardiogram: Transthoracic echo (TTE) complete ([facility] Only):   Results for orders placed or performed during the hospital encounter of 05/01/24   Echo   Result Value Ref Range    BSA 1.88 m2    Pineda's Biplane MOD Ejection Fraction 26 %    LVOT stroke volume 47.18 cm3    LVIDd 5.64 3.5 - 6.0 cm    LV Systolic Volume 110.97 mL    LV Systolic Volume Index 60.0 mL/m2    LVIDs 4.87 (A) 2.1 - 4.0 cm    LV Diastolic Volume 156.29 mL    LV Diastolic Volume Index 84.48 mL/m2    IVS 0.95 0.6 - 1.1 cm    LVOT diameter 2.05 cm    LVOT area 3.3 cm2    FS 14 (A) 28 - 44 %    Left Ventricle Relative Wall Thickness 0.25 cm    Posterior Wall 0.71 0.6 - 1.1 cm    LV mass 175.00 g    LV Mass Index 95 g/m2    MV Peak E Chandra 0.50 m/s    TDI SEPTAL 0.04 m/s    MV Peak A Chandra 0.61 m/s    TR Max Chadnra 2.38 m/s    E/A ratio 0.82     IVRT 110.37 msec    E wave deceleration time 146.15 msec    LV SEPTAL E/E' RATIO 12.50 m/s    LVOT peak chandra 0.72 m/s    Left Ventricular Outflow Tract Mean  Velocity 0.51 cm/s    Left Ventricular Outflow Tract Mean Gradient 1.19 mmHg    RVDD 3.43 cm    RVOT peak VTI 12.2 cm    TAPSE 1.34 cm    RV/LV Ratio 0.61 cm    LA size 4.33 cm    Left Atrium Minor Axis 4.94 cm    Left Atrium Major Axis 5.34 cm    RA Major Axis 4.26 cm    Vn Nyquist MS 0.33 m/s    AV mean gradient 2 mmHg    AV peak gradient 4 mmHg    Ao peak shayy 1.05 m/s    Ao VTI 20.30 cm    LVOT peak VTI 14.30 cm    AV valve area 2.32 cm²    AV Velocity Ratio 0.69     AV index (prosthetic) 0.70     ANTWAN by Velocity Ratio 2.26 cm²    Radius 0.65 cm    Vn Nyquist 0.33 m/s    Mr max shayy 5.48 m/s    MR PISA EROA 0.16 cm2    MV stenosis pressure 1/2 time 42.38 ms    MV valve area p 1/2 method 5.19 cm2    Triscuspid Valve Regurgitation Peak Gradient 23 mmHg    PV mean gradient 1 mmHg    PV PEAK VELOCITY 0.79 m/s    PV peak gradient 2 mmHg    RVOT peak shayy 0.65 m/s    Ao root annulus 3.11 cm    STJ 2.91 cm    Ascending aorta 3.13 cm    IVC diameter 1.92 cm    ZLVIDS 3.39     ZLVIDD 0.93     LA Volume Index 43.9 mL/m2    LA volume 81.22 cm3    LA WIDTH 4.3 cm    RA Width 3.5 cm    EF 30 %    TV resting pulmonary artery pressure 31 mmHg    RV TB RVSP 10 mmHg    Est. RA pres 8 mmHg    Narrative      Left Ventricle: The left ventricle is normal in size. Normal wall   thickness. Regional wall motion abnormalities present. See diagram for   wall motion findings. Septal motion is consistent with bundle branch   block. There is moderately reduced systolic function with a visually   estimated ejection fraction of 30 - 40%. Ejection fraction by visual   approximation is 30%. Biplane (2D) method of discs ejection fraction is   26%. There is diastolic dysfunction but grade cannot be determined.    Right Ventricle: Normal right ventricular cavity size. Wall thickness   is normal. Systolic function is normal.    Left Atrium: Left atrium is moderately dilated.    Mitral Valve: There is moderate regurgitation.    Pulmonary Artery: The  estimated pulmonary artery systolic pressure is   31 mmHg.    IVC/SVC: Intermediate venous pressure at 8 mmHg.      and EKG: Reviewed

## 2024-05-07 NOTE — PROGRESS NOTES
[facility] ER Medicine  Progress Note     Patient Name:  Nahum Palacio  MRN:  [id number]  Patient Class: IP-Inpatient  Admission Date:  5/1/2024   Length of Stay:  5  Attending Physician: Chan Jimenes MD  Primary Care Provider: Eric Foster MD    Subjective:      Subsequent Care: Follow up NSTEMI (non-ST elevated myocardial infarction)        HPI:  Mr. Palacio, a 73-year-old male with a history of CAD status post coronary artery bypass grafting in 2006, diabetes mellitus, hypertension, hyperlipidemia, and statin intolerance, presented to the ED at [facility] overnight due to persistent heavy left shoulder pain, associated with jaw discomfort and nausea. He denied vomiting, diaphoresis, palpitations, near syncope, or syncope. Initially attributing the pain to a prior fall and suspecting musculoskeletal or arthritic origins, his concern escalated as the pain persisted despite home remedies. Initial labs showed elevated troponins (initially 0.072 increasing to 41) and BNP of 978, indicative of cardiac stress. An EKG demonstrated ischemic changes and QRS widening. An urgent transthoracic echocardiogram revealed a reduced EF, prompting further assessment via right and left heart catheterization with repeat BMP pending.    Admission to the ICU followed his catheterization procedure, performed by Dr. Kumar, which disclosed mostly occluded native vessels with a patent LIMA to LAD and a stented SVG to PDA. High filling pressures and moderate pulmonary hypertension were noted; thus, an IABP was placed, and IV Lasix 40 mg was administered. Mr. Palacio was stepped down out of the ICU where hospital medicine resumed care.       Hospital Course:   Mr. Palacio's hospital course began on 5/2/2024 when he was admitted to the ICU following a catheterization that included an SVG to PDA intervention, with intra-aortic balloon pump (IABP) support. He was awake, conversant, and stable with no  "ischemic events in the lower extremity. His lactic acid levels were elevated, prompting the initiation of dobutamine. He experienced a drop in blood pressure due to nitrates and losartan, requiring ongoing IABP support, which he tolerated well without chest pain or shortness of breath.    On 5/3/2024, further management included adjustments to his IABP to a 1:3 setting. He had no chest pain or shortness of breath and remained hemodynamically stable with stable hematocrit and creatinine levels.    By 5/4/2024, Mr. Palacio reported feeling better, denying any symptoms of CHF or angina. His creatinine was trending down to 1.5, with no evidence of groin hematoma and cessation of bleeding from the central line. The IABP was successfully removed the day prior without incident.    On 5/5/2024, he continued to feel well, reporting no chest pain or shortness of breath. His creatinine slightly increased to 1.6, and his BNP was mildly elevated, indicating manageable cardiac stress. His CVP was stable at 8.    5/6/2024: His creatinine increased marginally to 1.7, leading to the temporary withholding of Aldactone. He showed no signs of congestive pathology. LifeVest was ordered by Cardiology for sudden cardiac death prevention.     Interval Hx  Doing well this AM. No complaints. No chest pain or shoulder pain. Discussed case with Cardiology    Objective  BP (!) 123/58 (BP Location: Right arm, Patient Position: Lying)   Pulse (!) 52   Temp 97.8 °F (36.6 °C) (Oral)   Resp 18   Ht 5' 5" (1.651 m)   Wt 71.1 kg (156 lb 12 oz)   SpO2 (!) 91%   BMI 26.08 kg/m²     Intake/Output Summary (Last 24 hours) at 5/7/2024 1549  Last data filed at 5/7/2024 1230  Gross per 24 hour   Intake 480 ml   Output --   Net 480 ml       PHYSICAL EXAM  Vitals reviewed  Constitutional:       General: He is not in acute distress.     Appearance: Normal appearance. He is well-developed. He is not diaphoretic.      Neck: IJ TLC in place " "  Cardiovascular:      Rate and Rhythm: Normal rate and regular rhythm.      Chest Wall: PMI is not displaced.      Pulses: Intact distal pulses.      Heart sounds: Normal heart sounds. No murmur heard.     No friction rub. No gallop. No S3 or S4 sounds.   Pulmonary:      Effort: Pulmonary effort is normal. No respiratory distress.      Breath sounds: coarse crackles no wheezing   Abdominal:      General: There is no distension.   Musculoskeletal:      Right lower leg: No edema.      Left lower leg: No edema.   Skin:     General: Skin is warm and dry.      Findings: No erythema.   Neurological:      General: No focal deficit present.      Mental Status: He is alert and oriented to person, place, and time.   Psychiatric:         Mood and Affect: Mood normal.         Behavior: Behavior normal.     LABS  All labs from the past 24 hours were reviewed.     BMP:   Recent Labs   Lab 05/07/24  1500   *   *   K 4.0   CL 99   CO2 24   BUN 44*   CREATININE 2.5*   CALCIUM 9.4     CBC:   Recent Labs   Lab 05/06/24  0509 05/07/24  0531   WBC 10.63 9.56   HGB 14.2 14.3   HCT 43.9 44.5    283     CMP:   Recent Labs   Lab 05/06/24  0509 05/07/24  0531 05/07/24  1500   * 136 134*   K 4.3 5.3* 4.0    101 99   CO2 22* 25 24   * 186* 242*   BUN 33* 43* 44*   CREATININE 1.7* 2.3* 2.5*   CALCIUM 9.4 9.2 9.4   ANIONGAP 11 10 11     Cardiac Markers:   No results for input(s): "CKMB", "MYOGLOBIN", "BNP", "TROPISTAT" in the last 48 hours.    Coagulation: No results for input(s): "PT", "INR", "APTT" in the last 48 hours.  Lactic Acid:   Recent Labs   Lab 05/06/24  0843   LACTATE 1.8     Magnesium: No results for input(s): "MG" in the last 48 hours.  Troponin:   Recent Labs   Lab 05/06/24  1012   TROPONINI 11.083*     TSH:   Recent Labs   Lab 03/25/24  1424   TSH 1.46     Urine Studies:   No results for input(s): "COLORU", "APPEARANCEUA", "PHUR", "SPECGRAV", "PROTEINUA", "GLUCUA", "KETONESU", " ""BILIRUBINUA", "OCCULTUA", "NITRITE", "UROBILINOGEN", "LEUKOCYTESUR", "RBCUA", "WBCUA", "BACTERIA", "SQUAMEPITHEL", "HYALINECASTS" in the last 48 hours.    Invalid input(s): "WRIGHTSUR"    IMAGING  All imaging from the past 24 hours were reviewed.     Imaging Results              X-Ray Chest AP Portable (Final result)  Result time 05/02/24 07:28:52      Final result by Eric Glasgow MD (05/02/24 07:28:52)                   Impression:      As above      Electronically signed by: Eric Glasgow MD  Date:    05/02/2024  Time:    07:28               Narrative:    EXAMINATION:  XR CHEST AP PORTABLE    CLINICAL HISTORY:  Chest Pain;    FINDINGS:  Single view of the chest.  Comparison 11/24/2022.    Cardiac silhouette is enlarged but stable.  Status post CABG.  Sternotomy wires are intact.  Streaky infiltrates are seen throughout the lungs likely reflecting mild pulmonary edema.  Interstitial pneumonia thought to be less likely.  No evidence of pleural effusion or pneumothorax.  Bones appear intact.  Age-indeterminate left-sided 4th rib fracture again noted.  Moderate degenerative changes and moderate atherosclerotic disease.                                      Assessment/Plan:    * NSTEMI (non-ST elevated myocardial infarction)  --s/p C with stents to vein graft and IABP placement 5/2  --Iabp removed on 5/4 05/07/2024  -lifevest received in the afternoon  -on bb, brillinta, statin, and ASA     Acute combined systolic and diastolic congestive heart failure  Cardiogenic shock - resolved  05/07/2024  --LifeVest received  --waiting for renal function to improve prior to restarting ARBs     CKD  --Cr at baseline, GFR moderately decreased from previous lab draws  --avoid nephrotoxic agents where possible  --renally dose meds  --daily BMP to monitor renal fxn     05/07/2024  -Renal function worsened on 2 serial labs  -Small delta change, but will keep overnight to trend AM labs  -250 cc bolus over 4 hours " ordered  -Renal US also ordered for completion and to r/o postobstructive uropathy    Diabetes mellitus with hyperglycemia  5/6/24  --last A1c 6.4 on 05/02/2024  --AM fasting glucose slightly higher than goal (152)  --holding home PO medications for now  --continue basal insulin as ordered  --Accuchecks [facility]         CORE MEASURES:  VTE Risk Mitigation (From admission, onward)           Ordered     IP VTE HIGH RISK PATIENT  Once         05/02/24 0324     Place sequential compression device  Until discontinued         05/02/24 0324                    Code Status: FULL    Diet: Diet Cardiac    Disposition: pending AM labs/improved renal function       Chan Jimenes MD  Department of Hospital Medicine   [facility] - Telemetry ([facility])

## 2024-05-08 ENCOUNTER — TELEPHONE (OUTPATIENT)
Dept: CARDIOLOGY | Facility: CLINIC | Age: 74
End: 2024-05-08
Payer: MEDICARE

## 2024-05-08 VITALS
WEIGHT: 161.81 LBS | HEART RATE: 68 BPM | TEMPERATURE: 98 F | OXYGEN SATURATION: 92 % | SYSTOLIC BLOOD PRESSURE: 119 MMHG | RESPIRATION RATE: 17 BRPM | DIASTOLIC BLOOD PRESSURE: 62 MMHG | BODY MASS INDEX: 26.96 KG/M2 | HEIGHT: 65 IN

## 2024-05-08 LAB
ANION GAP SERPL CALC-SCNC: 11 MMOL/L (ref 8–16)
BASOPHILS # BLD AUTO: 0.11 K/UL (ref 0–0.2)
BASOPHILS NFR BLD: 1.1 % (ref 0–1.9)
BUN SERPL-MCNC: 44 MG/DL (ref 8–23)
CALCIUM SERPL-MCNC: 9 MG/DL (ref 8.7–10.5)
CHLORIDE SERPL-SCNC: 105 MMOL/L (ref 95–110)
CO2 SERPL-SCNC: 19 MMOL/L (ref 23–29)
CREAT SERPL-MCNC: 1.9 MG/DL (ref 0.5–1.4)
DIFFERENTIAL METHOD BLD: ABNORMAL
EOSINOPHIL # BLD AUTO: 0.4 K/UL (ref 0–0.5)
EOSINOPHIL NFR BLD: 4 % (ref 0–8)
ERYTHROCYTE [DISTWIDTH] IN BLOOD BY AUTOMATED COUNT: 14.6 % (ref 11.5–14.5)
EST. GFR  (NO RACE VARIABLE): 36 ML/MIN/1.73 M^2
GLUCOSE SERPL-MCNC: 182 MG/DL (ref 70–110)
HCT VFR BLD AUTO: 39.8 % (ref 40–54)
HGB BLD-MCNC: 13.1 G/DL (ref 14–18)
IMM GRANULOCYTES # BLD AUTO: 0.15 K/UL (ref 0–0.04)
IMM GRANULOCYTES NFR BLD AUTO: 1.6 % (ref 0–0.5)
LYMPHOCYTES # BLD AUTO: 1.3 K/UL (ref 1–4.8)
LYMPHOCYTES NFR BLD: 13.4 % (ref 18–48)
MCH RBC QN AUTO: 28.8 PG (ref 27–31)
MCHC RBC AUTO-ENTMCNC: 32.9 G/DL (ref 32–36)
MCV RBC AUTO: 88 FL (ref 82–98)
MONOCYTES # BLD AUTO: 1 K/UL (ref 0.3–1)
MONOCYTES NFR BLD: 9.8 % (ref 4–15)
NEUTROPHILS # BLD AUTO: 6.8 K/UL (ref 1.8–7.7)
NEUTROPHILS NFR BLD: 70.1 % (ref 38–73)
NRBC BLD-RTO: 0 /100 WBC
PLATELET # BLD AUTO: 257 K/UL (ref 150–450)
PMV BLD AUTO: 10.2 FL (ref 9.2–12.9)
POCT GLUCOSE: 179 MG/DL (ref 70–110)
POTASSIUM SERPL-SCNC: 3.9 MMOL/L (ref 3.5–5.1)
RBC # BLD AUTO: 4.55 M/UL (ref 4.6–6.2)
SODIUM SERPL-SCNC: 135 MMOL/L (ref 136–145)
WBC # BLD AUTO: 9.67 K/UL (ref 3.9–12.7)

## 2024-05-08 PROCEDURE — 36415 COLL VENOUS BLD VENIPUNCTURE: CPT | Performed by: STUDENT IN AN ORGANIZED HEALTH CARE EDUCATION/TRAINING PROGRAM

## 2024-05-08 PROCEDURE — 85025 COMPLETE CBC W/AUTO DIFF WBC: CPT | Performed by: INTERNAL MEDICINE

## 2024-05-08 PROCEDURE — 25000003 PHARM REV CODE 250: Performed by: INTERNAL MEDICINE

## 2024-05-08 PROCEDURE — 99233 SBSQ HOSP IP/OBS HIGH 50: CPT | Mod: ,,, | Performed by: STUDENT IN AN ORGANIZED HEALTH CARE EDUCATION/TRAINING PROGRAM

## 2024-05-08 PROCEDURE — 36415 COLL VENOUS BLD VENIPUNCTURE: CPT | Performed by: INTERNAL MEDICINE

## 2024-05-08 PROCEDURE — 80048 BASIC METABOLIC PNL TOTAL CA: CPT | Performed by: STUDENT IN AN ORGANIZED HEALTH CARE EDUCATION/TRAINING PROGRAM

## 2024-05-08 RX ORDER — METOPROLOL SUCCINATE 50 MG/1
50 TABLET, EXTENDED RELEASE ORAL DAILY
Status: DISCONTINUED | OUTPATIENT
Start: 2024-05-09 | End: 2024-05-08 | Stop reason: HOSPADM

## 2024-05-08 RX ORDER — NITROGLYCERIN 0.4 MG/1
0.4 TABLET SUBLINGUAL EVERY 5 MIN PRN
Qty: 30 TABLET | Refills: 3 | Status: SHIPPED | OUTPATIENT
Start: 2024-05-08 | End: 2025-05-08

## 2024-05-08 RX ADMIN — FERROUS SULFATE TAB 325 MG (65 MG ELEMENTAL FE) 1 EACH: 325 (65 FE) TAB at 08:05

## 2024-05-08 RX ADMIN — ASPIRIN 81 MG CHEWABLE TABLET 81 MG: 81 TABLET CHEWABLE at 08:05

## 2024-05-08 RX ADMIN — INSULIN ASPART 2 UNITS: 100 INJECTION, SOLUTION INTRAVENOUS; SUBCUTANEOUS at 06:05

## 2024-05-08 RX ADMIN — TAMSULOSIN HYDROCHLORIDE 0.4 MG: 0.4 CAPSULE ORAL at 08:05

## 2024-05-08 RX ADMIN — INSULIN DETEMIR 11 UNITS: 100 INJECTION, SOLUTION SUBCUTANEOUS at 08:05

## 2024-05-08 RX ADMIN — LEVOTHYROXINE SODIUM 50 MCG: 50 TABLET ORAL at 05:05

## 2024-05-08 RX ADMIN — METOPROLOL SUCCINATE 50 MG: 50 TABLET, EXTENDED RELEASE ORAL at 08:05

## 2024-05-08 NOTE — PROGRESS NOTES
[facility] - Telemetry ([facility])  Adult Nutrition  Progress Note    SUMMARY       Recommendations    1.  Encourage compliance to cardiac, diabetic oral diet    2. Monitor labs   3. Collaboration with medical providers    Goals: Patient to consume >75% of estimated energy needs prior to RD follow up  Nutrition Goal Status: goal met  Communication of RD Recs: other (comment) (POC)    Assessment and Plan  Nutrition Problem  Altered nutritional needs    Related to (etiology):   Cardiac disease and Diabetes     Signs and Symptoms (as evidenced by):   Abnormal labs    Interventions/Recommendations (treatment strategy):  Disease specific diet   Collaboration with medical providers    Nutrition Diagnosis Status:   Continues         Malnutrition Assessment       Patient does not meet positive criteria of NFPE at this time.  RD to continue to monitor and follow.                                 Reason for Assessment    Reason For Assessment: RD follow-up    Diagnosis: cardiac disease  Patient Active Problem List   Diagnosis    Coronary artery disease    Diabetes mellitus    Hypertension    Hypothyroid    Closed displaced fracture of left femoral neck    S/P total left hip arthroplasty    NSTEMI (non-ST elevated myocardial infarction)    Acute exacerbation of CHF (congestive heart failure)    CKD (chronic kidney disease)    Ischemic cardiomyopathy    Acute combined systolic and diastolic congestive heart failure       Relevant Medical History:   Past Medical History:   Diagnosis Date    CAD (coronary artery disease) of artery bypass graft     Diabetes mellitus     HLD (hyperlipidemia)     Hypertension     Statin intolerance        Interdisciplinary Rounds: did not attend (RD remote)    General Information Comments:   5/7/2024: Patient is on a cardiac oral diet with fair to good appetite and intake noted at 75% at this time.  No tolerance complaints reported.  Labs reviewed.  NKFA.  No wounds or significant weight loss noted.  RD  "to continue to monitor and follow.    Nutrition Discharge Planning: Patient to continue on recommended cardiac oral diet post discharge    Nutrition Risk Screen    Nutrition Risk Screen: no indicators present  Nutrition Related Social Determinants of Health: SDOH: Adequate food in home environment and None Identified    Nutrition/Diet History    Spiritual, Cultural Beliefs, Roman Catholic Practices, Values that Affect Care: no  Food Allergies: NKFA    Anthropometrics    Temp: 97.7 °F (36.5 °C)  Height Method: Stated  Height: 5' 5" (165.1 cm)  Height (inches): 65 in  Weight Method: Standard Scale  Weight: 71.1 kg (156 lb 12 oz)  Weight (lb): 156.75 lb  Ideal Body Weight (IBW), Male: 136 lb  % Ideal Body Weight, Male (lb): 115.26 %  BMI (Calculated): 26.1  BMI Grade: 25 - 29.9 - overweight       Lab/Procedures/Meds    Pertinent Labs Reviewed: reviewed  BMP  Lab Results   Component Value Date     (L) 05/07/2024    K 4.0 05/07/2024    CL 99 05/07/2024    CO2 24 05/07/2024    BUN 44 (H) 05/07/2024    CREATININE 2.5 (H) 05/07/2024    CALCIUM 9.4 05/07/2024    ANIONGAP 11 05/07/2024    EGFRNORACEVR 26 (A) 05/07/2024     Lab Results   Component Value Date    HGBA1C 6.4 (H) 05/02/2024     Lab Results   Component Value Date    CALCIUM 9.4 05/07/2024    PHOS 3.5 05/03/2024     Glucose   Date Value Ref Range Status   05/07/2024 242 (H) 70 - 110 mg/dL Final       Pertinent Medications Reviewed: reviewed  Scheduled Meds:   aspirin  81 mg Oral Daily    ferrous sulfate  1 tablet Oral Daily    insulin detemir U-100 (Levemir)  11 Units Subcutaneous Daily    lactated ringers  250 mL Intravenous Once    levothyroxine  50 mcg Oral Before breakfast    metoprolol succinate  50 mg Oral BID    tamsulosin  0.4 mg Oral Daily    ticagrelor  90 mg Oral BID     Continuous Infusions:  PRN Meds:.  Current Facility-Administered Medications:     acetaminophen, 650 mg, Oral, Q4H PRN    dextrose 10%, 12.5 g, Intravenous, PRN    dextrose 10%, 25 g, " Intravenous, PRN    glucagon (human recombinant), 1 mg, Intramuscular, PRN    guaiFENesin 100 mg/5 ml, 200 mg, Oral, Q4H PRN    hydrALAZINE, 10 mg, Intravenous, Q6H PRN    HYDROcodone-acetaminophen, 1 tablet, Oral, Q6H PRN    influenza 65up-adj, 0.5 mL, Intramuscular, vaccine x 1 dose    insulin aspart U-100, 0-10 Units, Subcutaneous, QID (AC + HS) PRN    magnesium oxide, 800 mg, Oral, PRN    magnesium oxide, 800 mg, Oral, PRN    nitroGLYCERIN, 0.4 mg, Sublingual, Q5 Min PRN    ondansetron, 8 mg, Oral, Q8H PRN    potassium bicarbonate, 35 mEq, Oral, PRN    potassium bicarbonate, 50 mEq, Oral, PRN    potassium bicarbonate, 60 mEq, Oral, PRN    Physical Findings/Assessment         Estimated/Assessed Needs    Weight Used For Calorie Calculations: 71.1 kg (156 lb 12 oz)  Energy Calorie Requirements (kcal): 25-30kcals/kg (1777-2133kcals/day)  Energy Need Method: Kcal/kg  Protein Requirements: 0.8g/kg (57g/day)  Weight Used For Protein Calculations: 71.1 kg (156 lb 12 oz)  Fluid Requirements (mL): 1ml/kcal  Estimated Fluid Requirement Method: RDA Method  RDA Method (mL): 25  CHO Requirement: 250      Nutrition Prescription Ordered    Current Diet Order: Cardiac    Evaluation of Received Nutrient/Fluid Intake    % Kcal Needs: 75%  % Protein Needs: 75%  I/O: 5/7/2024: -4434mL  Energy Calories Required: meeting needs  Protein Required: meeting needs  Fluid Required: meeting needs  Comments: LBM: 5/6/2024  Tolerance: tolerating  % Intake of Estimated Energy Needs: 50 - 75 %  % Meal Intake: 50 - 75 %    Nutrition Risk    Level of Risk/Frequency of Follow-up: low (follow up: 1x per week)     Monitor and Evaluation    Food and Nutrient Intake: energy intake, food and beverage intake  Food and Nutrient Adminstration: diet order  Knowledge/Beliefs/Attitudes: beliefs and attitudes  Physical Activity and Function: nutrition-related ADLs and IADLs, factors affecting access to physical activity  Anthropometric Measurements:  height/length, weight, weight change, body mass index  Biochemical Data, Medical Tests and Procedures: electrolyte and renal panel, gastrointestinal profile, glucose/endocrine profile, inflammatory profile, lipid profile     Nutrition Follow-Up    RD Follow-up?: Yes  Navya Cox MS, RDN, LDN

## 2024-05-08 NOTE — PLAN OF CARE
Problem: Acute Coronary Syndrome  Goal: Optimal Adaptation to Illness  Outcome: Met  Goal: Absence of Cardiac-Related Pain  Outcome: Met  Goal: Normalized Cardiac Rhythm  Outcome: Met  Goal: Effective Cardiac Pump Function  Outcome: Met  Goal: Adequate Tissue Perfusion  Outcome: Met     Problem: Cardiac Catheterization (Diagnostic/Interventional)  Goal: Absence of Bleeding  Outcome: Met  Goal: Absence of Contrast-Induced Injury  Outcome: Met  Goal: Stable Heart Rate and Rhythm  Outcome: Met  Goal: Absence of Embolism Signs and Symptoms  Outcome: Met  Goal: Anesthesia/Sedation Recovery  Outcome: Met  Goal: Optimal Pain Control and Function  Outcome: Met  Goal: Absence of Vascular Access Complication  Outcome: Met     Problem: Adult Inpatient Plan of Care  Goal: Plan of Care Review  Outcome: Met  Goal: Patient-Specific Goal (Individualized)  Outcome: Met  Goal: Absence of Hospital-Acquired Illness or Injury  Outcome: Met  Goal: Readiness for Transition of Care  Outcome: Met     Problem: Diabetes Comorbidity  Goal: Blood Glucose Level Within Targeted Range  Outcome: Met

## 2024-05-08 NOTE — PLAN OF CARE
[facility] - Telemetry (Hospital)  Discharge Final Note    Primary Care Provider: Eric Foster MD    Expected Discharge Date: 5/8/2024    Final Discharge Note (most recent)       Final Note - 05/08/24 1135          Final Note    Assessment Type Final Discharge Note     Anticipated Discharge Disposition Home or Self Care        Post-Acute Status    Post-Acute Authorization HME     HME Status Set-up Complete/Auth obtained     Coverage [employer]     Discharge Delays None known at this time                     Important Message from Medicare             Contact Info       Eric Foster MD   Specialty: Internal Medicine   Relationship: PCP - General    [facility]  [address] [facility] [address]   Phone: [phone]       Next Steps: Schedule an appointment as soon as possible for a visit    Frederick Montana MD   Specialty: Cardiology    [address] [place] [address]  [address]  [facility] [address]   Phone: [phone]       Next Steps: Schedule an appointment as soon as possible for a visit          DC Disposition: home with family, [employer] Lifevest delivered at bedside  Family Notified: Patient at bedside  Transportation: personal transportation    Patient needed HME LifeVest reviewed and delivered to bedside. DIANA sent message to Pancho ALLEN With [employer] Lifevest. Pancho received insurance approval and [employer] nurse completed fitting at bedside.    Patient has resources to schedule hospital follow up with non-[facility] PCP on AVS.

## 2024-05-08 NOTE — PLAN OF CARE
Problem: Acute Coronary Syndrome  Goal: Optimal Adaptation to Illness  Outcome: Progressing  Goal: Absence of Cardiac-Related Pain  Outcome: Progressing  Goal: Normalized Cardiac Rhythm  Outcome: Progressing  Goal: Effective Cardiac Pump Function  Outcome: Progressing  Goal: Adequate Tissue Perfusion  Outcome: Progressing

## 2024-05-08 NOTE — PLAN OF CARE
Nutrition Plan of Care:    Recommendations     1.  Encourage compliance to cardiac, diabetic oral diet    2. Monitor labs   3. Collaboration with medical providers     Goals: Patient to consume >75% of estimated energy needs prior to RD follow up  Nutrition Goal Status: goal met  Communication of RD Recs: other (comment) (POC)     Assessment and Plan  Nutrition Problem  Altered nutritional needs     Related to (etiology):   Cardiac disease and Diabetes      Signs and Symptoms (as evidenced by):   Abnormal labs     Interventions/Recommendations (treatment strategy):  Disease specific diet   Collaboration with medical providers     Nutrition Diagnosis Status:   Continues       Navya Cox MS, RDN, LDN

## 2024-05-08 NOTE — ASSESSMENT & PLAN NOTE
-BNP elevated and CXR with congestion  -Gently diurese  -Preliminary review of TTE with depressed EF, official report to follow  -Continue BB  -Will further optimize regimen as tolerated  -R/LHC tentatively planned today    5/4/2024   Resolved will discontinue inotropes.    5/5/2024 ADD LOW DOSE DIURETICS    5/6/24  -Continue po Lasix, ARB, BB  -Hold Aldactone due to bumped creatinine    5/7/24  -Lasix, ARB held due to bumped creatinine  -Continue BB as pulse permits    5/8/24  -Continue BB  -Lasix 20 mg prn  -No ARB or ARNI given renal function

## 2024-05-08 NOTE — ASSESSMENT & PLAN NOTE
Secondary to nstemi   Echo    Interpretation Summary    Left Ventricle: The left ventricle is normal in size. Normal wall thickness. Regional wall motion abnormalities present. See diagram for wall motion findings. Septal motion is consistent with bundle branch block. There is moderately reduced systolic function with a visually estimated ejection fraction of 30 - 40%. Ejection fraction by visual approximation is 30%. Biplane (2D) method of discs ejection fraction is 26%. There is diastolic dysfunction but grade cannot be determined.    Right Ventricle: Normal right ventricular cavity size. Wall thickness is normal. Systolic function is normal.    Left Atrium: Left atrium is moderately dilated.    Mitral Valve: There is moderate regurgitation.    Pulmonary Artery: The estimated pulmonary artery systolic pressure is 31 mmHg.    IVC/SVC: Intermediate venous pressure at 8 mmHg.    Recent Labs   Lab 05/07/24  1500   *       Patient was diuresed revascularized inotropes started for lactic acidosis.     5/4/2024  Lactic acidosis resolved   Will stop inotropes.    5/5/2024   ADD LOW DOSE DIURETICS    5/6/24  -Continue BB, po Lasix, ARB  -Needs LifeVest for SCD prevention EF 30%    5/7/24  -Appears on dry side  -ARB, po Lasix held  -BNP, BMP pending at 3 PM    5/8/24  -Compensated  -Continue BB  -Lasix 20 mg prn  -No ARB or ARNI given renal function

## 2024-05-08 NOTE — ASSESSMENT & PLAN NOTE
-Continue CCB, BB  -Will optimize meds as tolerated    5/4/2024   Stop amlodipine increase b blockers and afterload.    5/6/24  -Continue BB, ARB  -Will consider switching to Entresto tmw    5/7/24  -ARB held  -BB as pulse permits---bradycardic this AM    5/8/24  -Continue BB  -No ARB  or ARNI given renal function

## 2024-05-08 NOTE — ASSESSMENT & PLAN NOTE
See nstemi     Andie djust optimize medical therapy and wean off iabp.    5/4/2024   Iabp removed will increase afterload add aldactone.  5/5/2024   IS ON AFTERLOAD ALDACTONE AND B BLOCKERS. ANDIE DD LOW DOSE DIURETICS    5/6/24  -Stable  -Continue BB, low dose Lasix, ARB  -LifeVest for  SCD prevention    5/7/24  -No CP symptoms  -Continue BB as pulse permits  -Lasix, ARB held due to bumped creatinine    5/8/24  -See above

## 2024-05-08 NOTE — ASSESSMENT & PLAN NOTE
-Creatinine 1.8, repeat pending    5/3/2024  Continue monitoring    5/4/2024  Cr improving    5/5/2024  CR IS 1.6 RONALD DD LOW DOSE DIURETICS    5/6/24  -Creatinine 1.7, monitor    5/7/24  -Creatinine bumped to 2.3, repeat BMP scheduled for 3 PM    5/8/24  -Improved

## 2024-05-08 NOTE — TELEPHONE ENCOUNTER
Pt was discharged today and was told that you would send in nitro. Please send to [facility]'s pharmacy.    ----- Message from Lilly Golden sent at 5/8/2024 12:12 PM CDT -----  Contact: Nahum Hart is calling in regards to nitro being sent to pharmacy. Pt can be reached at [phone].        [facility]  [address] [place] [id number] [phone]    Thanks  JULIANA

## 2024-05-08 NOTE — PLAN OF CARE
A239/A239 ALESSIA Palacio is a 73 y.o.male admitted on 5/1/2024 for NSTEMI (non-ST elevated myocardial infarction)   Code Status: Full Code MRN: [id number]   Review of patient's allergies indicates:   Allergen Reactions    Statins-hmg-coa reductase inhibitors Other (See Comments)     Muscle aches  Joint Stiffness     Past Medical History:   Diagnosis Date    CAD (coronary artery disease) of artery bypass graft     Diabetes mellitus     HLD (hyperlipidemia)     Hypertension     Statin intolerance       PRN meds    acetaminophen, 650 mg, Q4H PRN  dextrose 10%, 12.5 g, PRN  dextrose 10%, 25 g, PRN  glucagon (human recombinant), 1 mg, PRN  guaiFENesin 100 mg/5 ml, 200 mg, Q4H PRN  hydrALAZINE, 10 mg, Q6H PRN  HYDROcodone-acetaminophen, 1 tablet, Q6H PRN  influenza 65up-adj, 0.5 mL, vaccine x 1 dose  insulin aspart U-100, 0-10 Units, QID (AC + HS) PRN  magnesium oxide, 800 mg, PRN  magnesium oxide, 800 mg, PRN  nitroGLYCERIN, 0.4 mg, Q5 Min PRN  ondansetron, 8 mg, Q8H PRN  potassium bicarbonate, 35 mEq, PRN  potassium bicarbonate, 50 mEq, PRN  potassium bicarbonate, 60 mEq, PRN      Chart check completed. Will continue plan of care.      Orientation: oriented x 4  Laura Coma Scale Score: 15     Lead Monitored: Lead II Rhythm: normal sinus rhythm    Cardiac/Telemetry Box Number: [id number]  VTE Required Core Measure: Pharmacological prophylaxis initiated/maintained Last Bowel Movement: 05/06/24  Diet Cardiac  Voiding Characteristics: voids spontaneously without difficulty  Nguyễn Score: 17  Fall Risk Score: 15  Accucheck [x]   Freq? AC/HS     Lines/Drains/Airways       Peripheral Intravenous Line  Duration                  Peripheral IV - Single Lumen 05/01/24 2329 20 G Right Antecubital 6 days         Peripheral IV - Single Lumen 05/05/24 1600 20 G Anterior;Right Forearm 2 days

## 2024-05-08 NOTE — PROGRESS NOTES
[facility] - Telemetry ([facility])  Cardiology  Progress Note    Patient Name: Nahum Palacio  MRN: [id number]  Admission Date: 5/1/2024  Hospital Length of Stay: 6 days  Code Status: Prior   Attending Physician: No att. providers found   Primary Care Physician: Eric Foster MD  Expected Discharge Date: 5/8/2024  Principal Problem:NSTEMI (non-ST elevated myocardial infarction)    Subjective:   HPI:  Mr. Palacio is a 73 year old male patient whose current medical conditions include CAD s/p remote CABG in 2006 (LIMA-LAD, SVG-Ramus, SVG-RCA), hyperlipidemia, HTN, and statin intolerance who presented to [facility] ED overnight due to persistent heavy left shoulder pain. Associated symptoms included jaw discomfort, nausea, and pain near his left pectoral muscle. He denied any associated vomiting, diaphoresis, palpitations, near syncope, or syncope. Reported he had been dealing with left shoulder pain for quite some time due to prior fall and thought it was due to MSK/arthritic pain but became concerned when pain persisted despite warm compresses/hot shower. Initial workup in ED revealed troponin of 0.072>0.1759 and BNP of 978. Patient subsequently admitted for further evaluation and treatment. Cardiology consulted to assist with management. Patient seen and examined today, resting in bed. Feeling better. Denies any left shoulder pain/CP during exam. No other CV complaints. He reports compliance with his medications. Followed on OP basis by Dr. Montana. Troponin bumped up to 41. EKG reviewed, shows ischemic changes/QRS widening in multiple leads. Preliminary bedside read of TTE by MD revealed depressed EF. Plans for R/LHC today pending repeat BMP.     Hospital Course:   5/3/2024 had svg to pda intervention with iabp support . His cr stable hct stable has no ischemia fly lower extremity. His lactic acid is x0ewoieox dobutamine started. He received nitrates and losartan dropped his bp .iabp on 1:3   No chest pian or shortness of  breath tolerated iabp well.    5/4/2024 feels better this morning denies any chf symptoms or angina . Cr 1.5 trending down.no groin hematoma. Bleeding from central line stopped.    5/5/2024 HE FEELS WELL THIS MORNING. HE AHS NO CHEST PAIN OR SHORTNESS OF BREATH HIS CR 1.6.HIS BNP IS MILDLY INCREASED .CVP 8     5/6/24-Patient seen and examined today, resting comfortably in bed. Admits to some mild SOB that is improving. No CP.  Creatinine bumped to 1.7, Aldactone held. LA WNL. Needs LifeVest for SCD prevention.    5/7/24-Patient seen and examined today, sitting up in bedside chair. Feels well. No CV complaints. No CP/SOB. Creatinine bumped to 2.3, K 5.3. Diuretic/ARB held, repeat labs scheduled for this afternoon. Awaiting LifeVest.    5/8/24-Patient seen and examined today, resting in bed. Feels great. Denies CP/SOB. Labs reviewed, creatinine improved to 1.9. LifeVest delivered.      Review of Systems   Constitutional: Negative.   HENT: Negative.     Eyes: Negative.    Cardiovascular: Negative.    Respiratory: Negative.     Endocrine: Negative.    Hematologic/Lymphatic: Negative.    Skin: Negative.    Musculoskeletal: Negative.    Gastrointestinal: Negative.    Genitourinary: Negative.    Neurological: Negative.    Psychiatric/Behavioral: Negative.     Allergic/Immunologic: Negative.      Objective:     Vital Signs (Most Recent):  Temp: 98 °F (36.7 °C) (05/08/24 0437)  Pulse: 68 (05/08/24 0843)  Resp: 17 (05/08/24 0839)  BP: 119/62 (05/08/24 0839)  SpO2: (!) 92 % (05/08/24 0839) Vital Signs (24h Range):  Temp:  [97.5 °F (36.4 °C)-98.2 °F (36.8 °C)] 98 °F (36.7 °C)  Pulse:  [52-70] 68  Resp:  [17-20] 17  SpO2:  [92 %-98 %] 92 %  BP: (100-130)/(52-67) 119/62     Weight: 73.4 kg (161 lb 13.1 oz)  Body mass index is 26.93 kg/m².     SpO2: (!) 92 %       No intake or output data in the 24 hours ending 05/08/24 1341    Lines/Drains/Airways       None                      Physical Exam  Vitals and nursing note reviewed.  "  Constitutional:       General: He is not in acute distress.     Appearance: Normal appearance. He is well-developed. He is not diaphoretic.   HENT:      Head: Normocephalic and atraumatic.   Eyes:      General:         Right eye: No discharge.         Left eye: No discharge.      Pupils: Pupils are equal, round, and reactive to light.   Cardiovascular:      Rate and Rhythm: Normal rate and regular rhythm.      Heart sounds: Normal heart sounds, S1 normal and S2 normal. No murmur heard.  Pulmonary:      Effort: Pulmonary effort is normal. No respiratory distress.      Breath sounds: Normal breath sounds.   Abdominal:      General: There is no distension.   Musculoskeletal:      Right lower leg: No edema.      Left lower leg: No edema.   Skin:     General: Skin is warm and dry.      Findings: No erythema.      Comments: Groin access site C/D/I; no bleeding erythema or drainage, no hematoma   Neurological:      General: No focal deficit present.      Mental Status: He is alert and oriented to person, place, and time.   Psychiatric:         Mood and Affect: Mood normal.         Behavior: Behavior normal.         Thought Content: Thought content normal.            Significant Labs: CMP   Recent Labs   Lab 05/07/24  0531 05/07/24  1500 05/08/24  0632    134* 135*   K 5.3* 4.0 3.9    99 105   CO2 25 24 19*   * 242* 182*   BUN 43* 44* 44*   CREATININE 2.3* 2.5* 1.9*   CALCIUM 9.2 9.4 9.0   ANIONGAP 10 11 11   , CBC   Recent Labs   Lab 05/07/24  0531 05/08/24  0426   WBC 9.56 9.67   HGB 14.3 13.1*   HCT 44.5 39.8*    257   , Troponin No results for input(s): "TROPONINI" in the last 48 hours., and All pertinent lab results from the last 24 hours have been reviewed.    Significant Imaging: Echocardiogram: Transthoracic echo (TTE) complete (Cupid Only):   Results for orders placed or performed during the hospital encounter of 05/01/24   Echo   Result Value Ref Range    BSA 1.88 m2    Pineda's " Biplane MOD Ejection Fraction 26 %    LVOT stroke volume 47.18 cm3    LVIDd 5.64 3.5 - 6.0 cm    LV Systolic Volume 110.97 mL    LV Systolic Volume Index 60.0 mL/m2    LVIDs 4.87 (A) 2.1 - 4.0 cm    LV Diastolic Volume 156.29 mL    LV Diastolic Volume Index 84.48 mL/m2    IVS 0.95 0.6 - 1.1 cm    LVOT diameter 2.05 cm    LVOT area 3.3 cm2    FS 14 (A) 28 - 44 %    Left Ventricle Relative Wall Thickness 0.25 cm    Posterior Wall 0.71 0.6 - 1.1 cm    LV mass 175.00 g    LV Mass Index 95 g/m2    MV Peak E Chandra 0.50 m/s    TDI SEPTAL 0.04 m/s    MV Peak A Chandra 0.61 m/s    TR Max Chandra 2.38 m/s    E/A ratio 0.82     IVRT 110.37 msec    E wave deceleration time 146.15 msec    LV SEPTAL E/E' RATIO 12.50 m/s    LVOT peak chandra 0.72 m/s    Left Ventricular Outflow Tract Mean Velocity 0.51 cm/s    Left Ventricular Outflow Tract Mean Gradient 1.19 mmHg    RVDD 3.43 cm    RVOT peak VTI 12.2 cm    TAPSE 1.34 cm    RV/LV Ratio 0.61 cm    LA size 4.33 cm    Left Atrium Minor Axis 4.94 cm    Left Atrium Major Axis 5.34 cm    RA Major Axis 4.26 cm    Vn Nyquist MS 0.33 m/s    AV mean gradient 2 mmHg    AV peak gradient 4 mmHg    Ao peak chandra 1.05 m/s    Ao VTI 20.30 cm    LVOT peak VTI 14.30 cm    AV valve area 2.32 cm²    AV Velocity Ratio 0.69     AV index (prosthetic) 0.70     ANTWAN by Velocity Ratio 2.26 cm²    Radius 0.65 cm    Vn Nyquist 0.33 m/s    Mr max chandra 5.48 m/s    MR PISA EROA 0.16 cm2    MV stenosis pressure 1/2 time 42.38 ms    MV valve area p 1/2 method 5.19 cm2    Triscuspid Valve Regurgitation Peak Gradient 23 mmHg    PV mean gradient 1 mmHg    PV PEAK VELOCITY 0.79 m/s    PV peak gradient 2 mmHg    RVOT peak chandra 0.65 m/s    Ao root annulus 3.11 cm    STJ 2.91 cm    Ascending aorta 3.13 cm    IVC diameter 1.92 cm    ZLVIDS 3.39     ZLVIDD 0.93     LA Volume Index 43.9 mL/m2    LA volume 81.22 cm3    LA WIDTH 4.3 cm    RA Width 3.5 cm    EF 30 %    TV resting pulmonary artery pressure 31 mmHg    RV TB RVSP 10 mmHg    Est.  RA pres 8 mmHg    Narrative      Left Ventricle: The left ventricle is normal in size. Normal wall   thickness. Regional wall motion abnormalities present. See diagram for   wall motion findings. Septal motion is consistent with bundle branch   block. There is moderately reduced systolic function with a visually   estimated ejection fraction of 30 - 40%. Ejection fraction by visual   approximation is 30%. Biplane (2D) method of discs ejection fraction is   26%. There is diastolic dysfunction but grade cannot be determined.    Right Ventricle: Normal right ventricular cavity size. Wall thickness   is normal. Systolic function is normal.    Left Atrium: Left atrium is moderately dilated.    Mitral Valve: There is moderate regurgitation.    Pulmonary Artery: The estimated pulmonary artery systolic pressure is   31 mmHg.    IVC/SVC: Intermediate venous pressure at 8 mmHg.     , EKG: Reviewed, and X-Ray: CXR: X-Ray Chest 1 View (CXR): No results found for this visit on 05/01/24. and X-Ray Chest PA and Lateral (CXR): No results found for this visit on 05/01/24.  Assessment and Plan:   Patient who presents with NSTEMI/ICM. Renal function improved. Med rec's below. Follow-up with primary cardiologist upon d/c.     * NSTEMI (non-ST elevated myocardial infarction)  -Patient with history of CAD s/p remote CABG who presents with L shoulder pain/CP and elevated troponin---consistent with NSTEMI  -Troponin 0.072>1.759>41.611, repeat pending  -EKG reviewed, ischemic changes with QRS widening in multiple leads  -Continue ASA, heparin gtt, CCB, BB  -Patient intolerant to statin  -R/LHC planned today pending creatinine trend    5/3/2024  Had svg to pda intervention no complication with IABP support.no angina optimal therapy on board    5/4/2024  Iabp removed no angina will adjust meds.    5/5/2024 NO ANGINA    5/7/24  -Stable  -No CP  -Continue ASA, statin, BB, Brilinta    Acute combined systolic and diastolic congestive heart  failure  Secondary to nstemi   Echo    Interpretation Summary    Left Ventricle: The left ventricle is normal in size. Normal wall thickness. Regional wall motion abnormalities present. See diagram for wall motion findings. Septal motion is consistent with bundle branch block. There is moderately reduced systolic function with a visually estimated ejection fraction of 30 - 40%. Ejection fraction by visual approximation is 30%. Biplane (2D) method of discs ejection fraction is 26%. There is diastolic dysfunction but grade cannot be determined.    Right Ventricle: Normal right ventricular cavity size. Wall thickness is normal. Systolic function is normal.    Left Atrium: Left atrium is moderately dilated.    Mitral Valve: There is moderate regurgitation.    Pulmonary Artery: The estimated pulmonary artery systolic pressure is 31 mmHg.    IVC/SVC: Intermediate venous pressure at 8 mmHg.    Recent Labs   Lab 05/07/24  1500   *       Patient was diuresed revascularized inotropes started for lactic acidosis.     5/4/2024  Lactic acidosis resolved   Will stop inotropes.    5/5/2024   ADD LOW DOSE DIURETICS    5/6/24  -Continue BB, po Lasix, ARB  -Needs LifeVest for SCD prevention EF 30%    5/7/24  -Appears on dry side  -ARB, po Lasix held  -BNP, BMP pending at 3 PM    5/8/24  -Compensated  -Continue BB  -Lasix 20 mg prn  -No ARB or ARNI given renal function    Ischemic cardiomyopathy  See nstemi     Andie djust optimize medical therapy and wean off iabp.    5/4/2024   Iabp removed will increase afterload add aldactone.  5/5/2024   IS ON AFTERLOAD ALDACTONE AND B BLOCKERS. ANDIE DD LOW DOSE DIURETICS    5/6/24  -Stable  -Continue BB, low dose Lasix, ARB  -LifeVest for  SCD prevention    5/7/24  -No CP symptoms  -Continue BB as pulse permits  -Lasix, ARB held due to bumped creatinine    5/8/24  -See above    CKD (chronic kidney disease)  -Creatinine 1.8, repeat pending    5/3/2024  Continue monitoring    5/4/2024  Cr  improving    5/5/2024  CR IS 1.6 RONALD DD LOW DOSE DIURETICS    5/6/24  -Creatinine 1.7, monitor    5/7/24  -Creatinine bumped to 2.3, repeat BMP scheduled for 3 PM    5/8/24  -Improved    Acute exacerbation of CHF (congestive heart failure)  -BNP elevated and CXR with congestion  -Gently diurese  -Preliminary review of TTE with depressed EF, official report to follow  -Continue BB  -Will further optimize regimen as tolerated  -R/LHC tentatively planned today    5/4/2024   Resolved will discontinue inotropes.    5/5/2024 ADD LOW DOSE DIURETICS    5/6/24  -Continue po Lasix, ARB, BB  -Hold Aldactone due to bumped creatinine    5/7/24  -Lasix, ARB held due to bumped creatinine  -Continue BB as pulse permits    5/8/24  -Continue BB  -Lasix 20 mg prn  -No ARB or ARNI given renal function    Hypertension  -Continue CCB, BB  -Will optimize meds as tolerated    5/4/2024   Stop amlodipine increase b blockers and afterload.    5/6/24  -Continue BB, ARB  -Will consider switching to Entresto tmw    5/7/24  -ARB held  -BB as pulse permits---bradycardic this AM    5/8/24  -Continue BB  -No ARB  or ARNI given renal function    Diabetes mellitus  -Mgmt as per hospital medicine    5/5/2024 WILL BENEFIT FROM FARXIGA WILL WATCH RENAL FUNCTION FIRST.    Coronary artery disease  -See plan under NSTEMI        VTE Risk Mitigation (From admission, onward)           Ordered     IP VTE HIGH RISK PATIENT  Once         05/02/24 0324                    Christelle Gonzalez PA-C  Cardiology  [facility] - Telemetry ([facility]

## 2024-05-08 NOTE — SUBJECTIVE & OBJECTIVE
Review of Systems   Constitutional: Negative.   HENT: Negative.     Eyes: Negative.    Cardiovascular: Negative.    Respiratory: Negative.     Endocrine: Negative.    Hematologic/Lymphatic: Negative.    Skin: Negative.    Musculoskeletal: Negative.    Gastrointestinal: Negative.    Genitourinary: Negative.    Neurological: Negative.    Psychiatric/Behavioral: Negative.     Allergic/Immunologic: Negative.      Objective:     Vital Signs (Most Recent):  Temp: 98 °F (36.7 °C) (05/08/24 0437)  Pulse: 68 (05/08/24 0843)  Resp: 17 (05/08/24 0839)  BP: 119/62 (05/08/24 0839)  SpO2: (!) 92 % (05/08/24 0839) Vital Signs (24h Range):  Temp:  [97.5 °F (36.4 °C)-98.2 °F (36.8 °C)] 98 °F (36.7 °C)  Pulse:  [52-70] 68  Resp:  [17-20] 17  SpO2:  [92 %-98 %] 92 %  BP: (100-130)/(52-67) 119/62     Weight: 73.4 kg (161 lb 13.1 oz)  Body mass index is 26.93 kg/m².     SpO2: (!) 92 %       No intake or output data in the 24 hours ending 05/08/24 1341    Lines/Drains/Airways       None                      Physical Exam  Vitals and nursing note reviewed.   Constitutional:       General: He is not in acute distress.     Appearance: Normal appearance. He is well-developed. He is not diaphoretic.   HENT:      Head: Normocephalic and atraumatic.   Eyes:      General:         Right eye: No discharge.         Left eye: No discharge.      Pupils: Pupils are equal, round, and reactive to light.   Cardiovascular:      Rate and Rhythm: Normal rate and regular rhythm.      Heart sounds: Normal heart sounds, S1 normal and S2 normal. No murmur heard.  Pulmonary:      Effort: Pulmonary effort is normal. No respiratory distress.      Breath sounds: Normal breath sounds.   Abdominal:      General: There is no distension.   Musculoskeletal:      Right lower leg: No edema.      Left lower leg: No edema.   Skin:     General: Skin is warm and dry.      Findings: No erythema.      Comments: Groin access site C/D/I; no bleeding erythema or drainage, no  "hematoma   Neurological:      General: No focal deficit present.      Mental Status: He is alert and oriented to person, place, and time.   Psychiatric:         Mood and Affect: Mood normal.         Behavior: Behavior normal.         Thought Content: Thought content normal.            Significant Labs: CMP   Recent Labs   Lab 05/07/24  0531 05/07/24  1500 05/08/24  0632    134* 135*   K 5.3* 4.0 3.9    99 105   CO2 25 24 19*   * 242* 182*   BUN 43* 44* 44*   CREATININE 2.3* 2.5* 1.9*   CALCIUM 9.2 9.4 9.0   ANIONGAP 10 11 11   , CBC   Recent Labs   Lab 05/07/24  0531 05/08/24  0426   WBC 9.56 9.67   HGB 14.3 13.1*   HCT 44.5 39.8*    257   , Troponin No results for input(s): "TROPONINI" in the last 48 hours., and All pertinent lab results from the last 24 hours have been reviewed.    Significant Imaging: Echocardiogram: Transthoracic echo (TTE) complete ([facility] Only):   Results for orders placed or performed during the hospital encounter of 05/01/24   Echo   Result Value Ref Range    BSA 1.88 m2    Pineda's Biplane MOD Ejection Fraction 26 %    LVOT stroke volume 47.18 cm3    LVIDd 5.64 3.5 - 6.0 cm    LV Systolic Volume 110.97 mL    LV Systolic Volume Index 60.0 mL/m2    LVIDs 4.87 (A) 2.1 - 4.0 cm    LV Diastolic Volume 156.29 mL    LV Diastolic Volume Index 84.48 mL/m2    IVS 0.95 0.6 - 1.1 cm    LVOT diameter 2.05 cm    LVOT area 3.3 cm2    FS 14 (A) 28 - 44 %    Left Ventricle Relative Wall Thickness 0.25 cm    Posterior Wall 0.71 0.6 - 1.1 cm    LV mass 175.00 g    LV Mass Index 95 g/m2    MV Peak E Chandra 0.50 m/s    TDI SEPTAL 0.04 m/s    MV Peak A Chandra 0.61 m/s    TR Max Chandra 2.38 m/s    E/A ratio 0.82     IVRT 110.37 msec    E wave deceleration time 146.15 msec    LV SEPTAL E/E' RATIO 12.50 m/s    LVOT peak chandra 0.72 m/s    Left Ventricular Outflow Tract Mean Velocity 0.51 cm/s    Left Ventricular Outflow Tract Mean Gradient 1.19 mmHg    RVDD 3.43 cm    RVOT peak VTI 12.2 cm    TAPSE " 1.34 cm    RV/LV Ratio 0.61 cm    LA size 4.33 cm    Left Atrium Minor Axis 4.94 cm    Left Atrium Major Axis 5.34 cm    RA Major Axis 4.26 cm    Vn Nyquist MS 0.33 m/s    AV mean gradient 2 mmHg    AV peak gradient 4 mmHg    Ao peak shayy 1.05 m/s    Ao VTI 20.30 cm    LVOT peak VTI 14.30 cm    AV valve area 2.32 cm²    AV Velocity Ratio 0.69     AV index (prosthetic) 0.70     ANTWAN by Velocity Ratio 2.26 cm²    Radius 0.65 cm    Vn Nyquist 0.33 m/s    Mr max shayy 5.48 m/s    MR PISA EROA 0.16 cm2    MV stenosis pressure 1/2 time 42.38 ms    MV valve area p 1/2 method 5.19 cm2    Triscuspid Valve Regurgitation Peak Gradient 23 mmHg    PV mean gradient 1 mmHg    PV PEAK VELOCITY 0.79 m/s    PV peak gradient 2 mmHg    RVOT peak shayy 0.65 m/s    Ao root annulus 3.11 cm    STJ 2.91 cm    Ascending aorta 3.13 cm    IVC diameter 1.92 cm    ZLVIDS 3.39     ZLVIDD 0.93     LA Volume Index 43.9 mL/m2    LA volume 81.22 cm3    LA WIDTH 4.3 cm    RA Width 3.5 cm    EF 30 %    TV resting pulmonary artery pressure 31 mmHg    RV TB RVSP 10 mmHg    Est. RA pres 8 mmHg    Narrative      Left Ventricle: The left ventricle is normal in size. Normal wall   thickness. Regional wall motion abnormalities present. See diagram for   wall motion findings. Septal motion is consistent with bundle branch   block. There is moderately reduced systolic function with a visually   estimated ejection fraction of 30 - 40%. Ejection fraction by visual   approximation is 30%. Biplane (2D) method of discs ejection fraction is   26%. There is diastolic dysfunction but grade cannot be determined.    Right Ventricle: Normal right ventricular cavity size. Wall thickness   is normal. Systolic function is normal.    Left Atrium: Left atrium is moderately dilated.    Mitral Valve: There is moderate regurgitation.    Pulmonary Artery: The estimated pulmonary artery systolic pressure is   31 mmHg.    IVC/SVC: Intermediate venous pressure at 8 mmHg.     , EKG:  Reviewed, and X-Ray: CXR: X-Ray Chest 1 View (CXR): No results found for this visit on 05/01/24. and X-Ray Chest PA and Lateral (CXR): No results found for this visit on 05/01/24.

## 2024-05-10 ENCOUNTER — TELEPHONE (OUTPATIENT)
Dept: CARDIOLOGY | Facility: CLINIC | Age: 74
End: 2024-05-10
Payer: MEDICARE

## 2024-05-10 NOTE — TELEPHONE ENCOUNTER
"[facility] hospital discharge 48-72 hour phone follow up completed.     Most Recent Hospital Discharge Date: 5/8/24  Last admission Diagnosis/chief complaint:cp    [facility] nurse Navigator spoke with pt        Pt reports the following:  [x]  Shortness of Breath with Activity-intermittent. Pt says it is much better than it was prior to admission  []  Shortness of Breath at rest   []  Fatigue  []  Edema   [] Chest pain or tightness  [] Weight Increase since discharge  [] None of the above    Medications:   Discharge medication reviewed with pt.  Pt reports having medication list available and has all medications at home for use per list.     Education:   Confirmed pt received "Home Care Guide for Heart Failure Patients" while admitted.   Reviewed key points as listed below.     Recommend 2 -3 gram sodium restriction and 1500 cc-2000 cc fluid restriction.  Encourage physical activity with graded exercise program.  Requested patient to weigh themselves daily, and to notify us if their weight increases by more than 3 lbs in 1 day or 5 lbs in 3 days.   Reminded patient to use "Daily weight and symptom tracker" to record and guide patient on when and how to call [facility]. PT may also use symptom tracker if no scale available  Pt reports being in the green (color) Zone. If in yellow/red, reminded that they should be calling [facility] today or now.     Watch for these Signs and Symptoms: If any of these occur, contact [facility] immediately:   Increase in shortness of breath with movement   Increase in swelling in your legs and ankles   Weight gain of more than 3 pounds in a day or 5 pounds in 3 days.   Difficulty breathing when you are lying down   Worsening fatigue or tiredness   Stomach bloating, a full feeling or a loss of appetite   Increased coughing--especially when you are lying down      Pt was able to verbalize back to nurse in their own words correct diet/fluid restrictions, necessity for " exercise, warning signs and symptoms, when and how to contact their [facility] team.      Pt educated on follow-up plan while in [facility] program to include:   Week 1 -  F/u appt with Provider and HF nurse (Date) pt reports he sees outside cardiologist Dr [place] and has f/u appt on Monday 5/13 at 11:45am   Week 2-5 - In person/ Virtual/ phone call check ins    Week 5-7 - Pt will discharge from [facility] and transition to longterm care provider (Cardiology/PCP/ Advanced Heart Failure).      Patient active on myChart? No      Pt given the following contact information for ease of communication: [phone] (Mon-Fri, 8a-5p) & for urgent issues on the weekend call David on-call [phone] to page the Cardiology MD on call.  Pt also encouraged utilize [facility] messaging as well.      Will follow up with pt at first clinic visit and HF nurse navigator available for pt questions, issues or concerns.

## 2025-01-27 NOTE — Clinical Note
Last Visit Date: 11/19/24  Next Visit Date: Visit date not found      The wire was inserted into the aorta.

## (undated) DEVICE — NDL PERCUTANEOUS 21G 7CM VASC

## (undated) DEVICE — DRAPE ANGIO BRACH 38X44IN

## (undated) DEVICE — PAD ABD 8X10 STERILE

## (undated) DEVICE — PAD DEFIB CADENCE ADULT R2

## (undated) DEVICE — KIT SYR REUSABLE

## (undated) DEVICE — DRAPE INCISE IOBAN 2 13X13IN

## (undated) DEVICE — DRAPE STERI U-SHAPED 47X51IN

## (undated) DEVICE — GOWN POLY REINF BRTH SLV XL

## (undated) DEVICE — APPLICATOR CHLORAPREP ORN 26ML

## (undated) DEVICE — SOL IRR NACL .9% 3000ML

## (undated) DEVICE — CATH JL4 5FR

## (undated) DEVICE — CATH GUIDE LAUNCH MB1 6F 100CM

## (undated) DEVICE — TAPE SILK 3IN

## (undated) DEVICE — PACK ANGIOPLASTY ACCESS PLUS

## (undated) DEVICE — GLOVE SURGICAL LATEX SZ 7

## (undated) DEVICE — SUT SILK 0 SUTUPAK SA86H

## (undated) DEVICE — DRAPE THREE-QTR REINF 53X77IN

## (undated) DEVICE — TAPE MEDIPORE 4IN X 2YDS

## (undated) DEVICE — DRAPE STERI INSTRUMENT 1018

## (undated) DEVICE — SEALER AQUAMANTYS 2.3 BIPOLAR

## (undated) DEVICE — CATH GUIDE LAUNCH MP1 6F 100CM

## (undated) DEVICE — CATH .025X6IN 7.5FR 16MM40ML

## (undated) DEVICE — KIT SURGIFLO HEMOSTATIC MATRIX

## (undated) DEVICE — GUIDEWIRE EMERALD .035IN 260CM

## (undated) DEVICE — SUPPORT ULNA NERVE PROTECTOR

## (undated) DEVICE — SHEET THYROID W/ISO-BAC

## (undated) DEVICE — KIT PT CARE HANA PROFX SSXT

## (undated) DEVICE — PACK HEART CATH BR

## (undated) DEVICE — SYS CLSR DERMABOND PRINEO 22CM

## (undated) DEVICE — DRAPE MOBILE C-ARM

## (undated) DEVICE — TOWEL OR DISP STRL BLUE 4/PK

## (undated) DEVICE — BRUSH SCRUB STERILE W/O GERMIC

## (undated) DEVICE — GAUZE SPONGE 4X4 12PLY

## (undated) DEVICE — CATH CV QD LUMN 6FRX110CM

## (undated) DEVICE — DRESSING PICO 7 TWO 10X30CM

## (undated) DEVICE — PACK BASIC SETUP SC BR

## (undated) DEVICE — SPONGE LAP 18X18 PREWASHED

## (undated) DEVICE — DRAPE IOBAN 2 STERI

## (undated) DEVICE — SUT VICRYL PLUS 0 CT1 36IN

## (undated) DEVICE — CATH IMPULSE IM CRV 100CM 5FR

## (undated) DEVICE — SUT VICRYL 2-0 27 CT-1

## (undated) DEVICE — CONTAINER SPECIMEN OR STER 4OZ

## (undated) DEVICE — KIT EVACUATOR 3-SPRING 1/8 DRN

## (undated) DEVICE — CATH NC EMERGE MR 4X30MM

## (undated) DEVICE — HOOD FLYTE PEELWY STERISHIELD

## (undated) DEVICE — KIT MANIFOLD LOW PRESS TUBING

## (undated) DEVICE — HEADREST ROUND DISP FOAM 9IN

## (undated) DEVICE — OMNIPAQUE 300MG 150ML VIAL

## (undated) DEVICE — NDL SAFETY 22G X 1.5 ECLIPSE

## (undated) DEVICE — KIT MICROINTRO 4F .018X40X7CM

## (undated) DEVICE — COVER TABLE HVY DTY 60X90IN

## (undated) DEVICE — GUIDE WIRE WHOLEY EXCHANGE 300

## (undated) DEVICE — SYR 30CC LUER LOCK

## (undated) DEVICE — COVER PROXIMA MAYO STAND

## (undated) DEVICE — UNDERGLOVES BIOGEL PI SIZE 8

## (undated) DEVICE — UNDERGLOVE BIOGEL PI SZ 6.5 LF

## (undated) DEVICE — SEALER AQUAMANTYS 3.48MM

## (undated) DEVICE — CATH JR4 5FR

## (undated) DEVICE — CATH PIG145 INFINITI 5X110CM

## (undated) DEVICE — SYR IRRIGATION BULB STER 60ML

## (undated) DEVICE — WIRE X-SUP CHOICE PT .014X182

## (undated) DEVICE — ANGIOTOUCH KIT

## (undated) DEVICE — BLADE SYSTEM 6 25MMX90MMX1.27M

## (undated) DEVICE — CATH MP 4FR 125CM

## (undated) DEVICE — CATH 5FR MP 100CM

## (undated) DEVICE — GUIDEWIRE V-18 CONTROL .18

## (undated) DEVICE — KIT GLIDESHEATH SLEND 6FR 10CM

## (undated) DEVICE — ELECTRODE BLADE INSULATED 1 IN

## (undated) DEVICE — BNDG COFLEX FOAM LF2 ST 4X5YD

## (undated) DEVICE — CATH INFINITI MULTIPAK JR4 5FR

## (undated) DEVICE — BAND TR COMP DEVICE REG 24CM

## (undated) DEVICE — CATH EMERGE MR 30 X 3.00

## (undated) DEVICE — GLOVE SURGICAL LATEX SZ 8

## (undated) DEVICE — DRAPE INCISE IOBAN 2 23X33IN

## (undated) DEVICE — ALCOHOL 70% ISOP RUBBING 4OZ

## (undated) DEVICE — SHEATH INTRODUCER 6FR 11CM

## (undated) DEVICE — INFLATOR ENCORE 26 BLLN INFL

## (undated) DEVICE — GUIDEWIRE SV-5 ST .018IN 300CM

## (undated) DEVICE — ELECTRODE REM PLYHSV RETURN 9